# Patient Record
Sex: FEMALE | Race: WHITE | NOT HISPANIC OR LATINO | Employment: FULL TIME | URBAN - METROPOLITAN AREA
[De-identification: names, ages, dates, MRNs, and addresses within clinical notes are randomized per-mention and may not be internally consistent; named-entity substitution may affect disease eponyms.]

---

## 2019-11-25 ENCOUNTER — OFFICE VISIT (OUTPATIENT)
Dept: FAMILY MEDICINE CLINIC | Facility: MEDICAL CENTER | Age: 44
End: 2019-11-25
Payer: COMMERCIAL

## 2019-11-25 VITALS
SYSTOLIC BLOOD PRESSURE: 128 MMHG | HEIGHT: 66 IN | WEIGHT: 216 LBS | HEART RATE: 68 BPM | DIASTOLIC BLOOD PRESSURE: 86 MMHG | BODY MASS INDEX: 34.72 KG/M2 | RESPIRATION RATE: 16 BRPM

## 2019-11-25 DIAGNOSIS — Z13.1 SCREENING FOR DIABETES MELLITUS: ICD-10-CM

## 2019-11-25 DIAGNOSIS — Z80.0 FAMILY HISTORY OF COLON CANCER IN FATHER: ICD-10-CM

## 2019-11-25 DIAGNOSIS — Z00.00 PHYSICAL EXAM, ANNUAL: Primary | ICD-10-CM

## 2019-11-25 DIAGNOSIS — Z13.29 SCREENING FOR THYROID DISORDER: ICD-10-CM

## 2019-11-25 DIAGNOSIS — Z11.4 SCREENING FOR HIV (HUMAN IMMUNODEFICIENCY VIRUS): ICD-10-CM

## 2019-11-25 DIAGNOSIS — Z11.59 NEED FOR HEPATITIS C SCREENING TEST: ICD-10-CM

## 2019-11-25 DIAGNOSIS — Z12.11 SCREENING FOR COLON CANCER: ICD-10-CM

## 2019-11-25 DIAGNOSIS — J45.909 ASTHMA, UNSPECIFIED ASTHMA SEVERITY, UNSPECIFIED WHETHER COMPLICATED, UNSPECIFIED WHETHER PERSISTENT: ICD-10-CM

## 2019-11-25 DIAGNOSIS — Z23 ENCOUNTER FOR IMMUNIZATION: ICD-10-CM

## 2019-11-25 DIAGNOSIS — Z13.220 SCREENING FOR LIPID DISORDERS: ICD-10-CM

## 2019-11-25 PROCEDURE — 90471 IMMUNIZATION ADMIN: CPT | Performed by: FAMILY MEDICINE

## 2019-11-25 PROCEDURE — 90670 PCV13 VACCINE IM: CPT | Performed by: FAMILY MEDICINE

## 2019-11-25 PROCEDURE — 99386 PREV VISIT NEW AGE 40-64: CPT | Performed by: FAMILY MEDICINE

## 2019-11-25 RX ORDER — LEVONORGESTREL / ETHINYL ESTRADIOL AND ETHINYL ESTRADIOL 150-30(84)
1 KIT ORAL EVERY MORNING
Refills: 2 | COMMUNITY
Start: 2019-10-11

## 2019-11-25 RX ORDER — MONTELUKAST SODIUM 10 MG/1
10 TABLET ORAL DAILY
Refills: 2 | COMMUNITY
Start: 2019-08-27 | End: 2021-11-18

## 2019-11-25 RX ORDER — CETIRIZINE HYDROCHLORIDE 10 MG/1
10 TABLET ORAL DAILY
COMMUNITY

## 2019-11-25 RX ORDER — ALBUTEROL SULFATE 90 UG/1
2 AEROSOL, METERED RESPIRATORY (INHALATION) EVERY 6 HOURS PRN
COMMUNITY

## 2019-11-25 NOTE — PROGRESS NOTES
Assessment/Plan:    No problem-specific Assessment & Plan notes found for this encounter  Diagnoses and all orders for this visit:    Physical exam, annual  42-year-old female presenting for a physical exam   BMI Counseling: Body mass index is 35 13 kg/m²  The BMI is above normal  Nutrition recommendations include decreasing overall calorie intake  Exercise recommendations include moderate aerobic physical activity for 150 minutes/week  Asthma, unspecified asthma severity, unspecified whether complicated, unspecified whether persistent  -     Ambulatory referral to Pulmonology; Future  Referral to pulmonology for continued management  Encounter for immunization  -     PNEUMOCOCCAL CONJUGATE VACCINE 13-VALENT GREATER THAN 6 MONTHS  Pneumococcal vaccine given and tolerated well  Flu vaccine recommended but patient declined  Tdap was recommended and patient states [facility]     Screening for lipid disorders  -     Lipid Panel with Direct LDL reflex; Future  Screening for diabetes mellitus  -     Comprehensive metabolic panel; Future  -     Hemoglobin A1C; Future  Screening for thyroid disorder  -     T4, free; Future  -     TSH, 3rd generation; Future  Check labs to screen for lipid disorders, diabetes and thyroid disorder  Need for hepatitis C screening test  -     Hepatitis C antibody; Future  Screening for HIV (human immunodeficiency virus)  -     HIV 1/2 AG-AB combo; Future  Patient did give verbal consent for hep C and HIV screening  Screening for colon cancer  -     Ambulatory referral to Gastroenterology; Future  Family history of colon cancer in father  -     Ambulatory referral to Gastroenterology; Future  Due to family history of colon cancer I will have patient see Gastroenterology for colon cancer screening  Other orders  -     montelukast (SINGULAIR) 10 mg tablet;  Take 10 mg by mouth daily  -     WIXELA INHUB 250-50 MCG/DOSE inhaler; 1 INHALATION EVERY 12 HOURS   -     Levonorgest-Eth Estrad 91-Day 0 15-0 03 &0 01 MG TABS; Take 1 tablet by mouth daily  -     cetirizine (ZyrTEC) 10 mg tablet; Take 10 mg by mouth daily  -     albuterol (PROVENTIL HFA,VENTOLIN HFA) 90 mcg/act inhaler; Inhale 2 puffs every 6 (six) hours as needed for wheezing    Continue regular follow-up with Gynecology  Keep upcoming appointment for mammogram     Follow-up in one year sooner if needed  Subjective:      Patient ID: Daniela Haney is a 53 y o  female  Patient presents to [facility] care  She is here today for a physical exam   She has had problems with high blood pressure in the past but was never diagnosed with hypertension  She has a diagnosis of asthma  Her pulmonologist did retire  Currently she uses albuterol, Zyrtec, Singulair and Wixela  She is in the process of looking for a new pulmonologist   She does have enough medication  She does not smoke  She drinks alcohol socially  No drug use  She states she eats healthy but does not exercise regularly  She otherwise has no acute concerns  She is agreeable to the pneumonia vaccine but does not want a flu vaccine  She would like some lab work  She does have a gynecologist through [facility] and will be getting her mammogram at [facility]  The following portions of the patient's history were reviewed and updated as appropriate:   She  has a past medical history of Asthma  She   Patient Active Problem List    Diagnosis Date Noted    Asthma 2019    Family history of colon cancer in father 2019     She  has a past surgical history that includes  section; Tonsillectomy; and Rotator cuff repair (Right)  Her family history includes Colon cancer in her father; Coronary artery disease in her mother; Heart disease in her mother; Hepatitis in her father; Liver cancer in her father  She  reports that she has never smoked  She has never used smokeless tobacco  She reports that she drinks alcohol  She reports that she does not use drugs  Current Outpatient Medications   Medication Sig Dispense Refill    albuterol (PROVENTIL HFA,VENTOLIN HFA) 90 mcg/act inhaler Inhale 2 puffs every 6 (six) hours as needed for wheezing      cetirizine (ZyrTEC) 10 mg tablet Take 10 mg by mouth daily      Levonorgest-Eth Estrad 91-Day 0 15-0 03 &0 01 MG TABS Take 1 tablet by mouth daily  2    montelukast (SINGULAIR) 10 mg tablet Take 10 mg by mouth daily  2    WIXELA INHUB 250-50 MCG/DOSE inhaler 1 INHALATION EVERY 12 HOURS   2     No current facility-administered medications for this visit  Current Outpatient Medications on File Prior to Visit   Medication Sig    albuterol (PROVENTIL HFA,VENTOLIN HFA) 90 mcg/act inhaler Inhale 2 puffs every 6 (six) hours as needed for wheezing    cetirizine (ZyrTEC) 10 mg tablet Take 10 mg by mouth daily    Levonorgest-Eth Estrad 91-Day 0 15-0 03 &0 01 MG TABS Take 1 tablet by mouth daily    montelukast (SINGULAIR) 10 mg tablet Take 10 mg by mouth daily    WIXELA INHUB 250-50 MCG/DOSE inhaler 1 INHALATION EVERY 12 HOURS  No current facility-administered medications on file prior to visit  She has No Known Allergies       Review of Systems   Constitutional: Negative for fever  Respiratory: Negative for shortness of breath  Cardiovascular: Negative for chest pain  Gastrointestinal: Negative for abdominal pain and blood in stool  Genitourinary: Negative for dysuria and hematuria  Musculoskeletal: Negative for arthralgias and myalgias  Skin: Negative for rash  Neurological: Negative for headaches  Objective:      /86 (BP Location: Left arm, Patient Position: Sitting, Cuff Size: Large)   Pulse 68   Resp 16   Ht 5' 5 75" (1 67 m)   Wt 98 kg (216 lb)   BMI 35 13 kg/m²          Physical Exam   Constitutional: She is oriented to person, place, and time  Vital signs are normal  She appears well-developed and well-nourished     HENT:   Head: Normocephalic and atraumatic  Right Ear: Tympanic membrane, external ear and ear canal normal    Left Ear: Tympanic membrane, external ear and ear canal normal    Nose: Nose normal    Mouth/Throat: Uvula is midline, oropharynx is clear and moist and mucous membranes are normal    Eyes: Pupils are equal, round, and reactive to light  Conjunctivae, EOM and lids are normal    Neck: Trachea normal  Neck supple  No thyromegaly present  Cardiovascular: Normal rate, regular rhythm, S1 normal and S2 normal    No murmur heard  Pulmonary/Chest: Effort normal and breath sounds normal    Abdominal: Soft  Bowel sounds are normal  There is no tenderness  Lymphadenopathy:     She has no cervical adenopathy  Neurological: She is alert and oriented to person, place, and time  No cranial nerve deficit  Skin: Skin is warm, dry and intact  Psychiatric: She has a normal mood and affect   Her speech is normal and behavior is normal  Thought content normal

## 2019-12-19 ENCOUNTER — DOCUMENTATION (OUTPATIENT)
Dept: PULMONOLOGY | Facility: CLINIC | Age: 44
End: 2019-12-19

## 2019-12-19 ENCOUNTER — OFFICE VISIT (OUTPATIENT)
Dept: PULMONOLOGY | Facility: CLINIC | Age: 44
End: 2019-12-19
Payer: COMMERCIAL

## 2019-12-19 VITALS
WEIGHT: 219.2 LBS | HEART RATE: 86 BPM | DIASTOLIC BLOOD PRESSURE: 80 MMHG | TEMPERATURE: 98.6 F | HEIGHT: 66 IN | BODY MASS INDEX: 35.23 KG/M2 | OXYGEN SATURATION: 97 % | SYSTOLIC BLOOD PRESSURE: 136 MMHG

## 2019-12-19 DIAGNOSIS — J45.909 ASTHMA, UNSPECIFIED ASTHMA SEVERITY, UNSPECIFIED WHETHER COMPLICATED, UNSPECIFIED WHETHER PERSISTENT: ICD-10-CM

## 2019-12-19 PROCEDURE — 99244 OFF/OP CNSLTJ NEW/EST MOD 40: CPT | Performed by: INTERNAL MEDICINE

## 2019-12-19 RX ORDER — ALBUTEROL SULFATE 2.5 MG/3ML
2.5 SOLUTION RESPIRATORY (INHALATION) EVERY 6 HOURS PRN
Qty: 90 VIAL | Refills: 11 | Status: SHIPPED | OUTPATIENT
Start: 2019-12-19

## 2019-12-19 RX ORDER — FLUTICASONE FUROATE AND VILANTEROL 100; 25 UG/1; UG/1
1 POWDER RESPIRATORY (INHALATION) DAILY
Qty: 1 INHALER | Refills: 11 | Status: SHIPPED | OUTPATIENT
Start: 2019-12-19 | End: 2020-12-23 | Stop reason: SDUPTHER

## 2019-12-19 NOTE — PROGRESS NOTES
Subjective:     Patient ID: Hernan Beasley is a 53 y o  female  HPI   Patient who was diagnosed with Asthma 20 years ago presents to establish care  She reports that her Asthma has been relatively well controlled  Patient used to see Dr Shyanne Badillo prior to him retiring 2 years ago  Patient reports that she has been using urgent cares to manage her Asthma for the last 2 years  The last time she experienced an Asthma exacerbation was this past October  She presented to an urgent care at that time and was subsequently diagnosed with a "lung infection" which was treated with a course of oral steroids and antibiotics  For her asthma, patient is currenly using a generic form of Advair (Wixela) once daily, Singular one tablet daily and Ventolin PRN  Patient reports that she only needs to use her rescue inhaler once a month  However, she reports that she always uses her rescue inhaler prior to exercising  Allergies- Allergic to Cats, dust and pollen    Social History- Has never smoked cigarettes  Patient is an  at [employer]  Family History- Mother with Asthma  Father  of liver cancer with metastasis to colon, lung and brain in his early 46s  The following portions of the patient's history were reviewed in this encounter and updated as appropriate:   Review of Systems   Constitutional: Negative for activity change, appetite change, chills, fatigue and unexpected weight change  HENT: Negative for congestion, rhinorrhea, sinus pressure and sinus pain  Eyes: Negative for visual disturbance  Respiratory: Negative for cough, chest tightness, shortness of breath and wheezing  Cardiovascular: Negative for chest pain, palpitations and leg swelling  Gastrointestinal: Negative for abdominal distention, abdominal pain, blood in stool, constipation, diarrhea, nausea and vomiting  Genitourinary: Negative for difficulty urinating and dysuria     Musculoskeletal: Negative for arthralgias and myalgias  Skin: Negative for color change  Allergic/Immunologic: Positive for environmental allergies  Neurological: Negative for dizziness, tremors, syncope, light-headedness and headaches  Psychiatric/Behavioral: Negative for sleep disturbance  Objective:  Physical Exam   Constitutional: She is oriented to person, place, and time  She appears well-developed and well-nourished  No distress  HENT:   Head: Normocephalic and atraumatic  Right Ear: External ear normal    Left Ear: External ear normal    Nose: Nose normal    Mouth/Throat: Oropharynx is clear and moist  No oropharyngeal exudate  Eyes: Conjunctivae and EOM are normal  Right eye exhibits no discharge  Left eye exhibits no discharge  No scleral icterus  Neck: Normal range of motion  Neck supple  Cardiovascular: Normal rate, regular rhythm, normal heart sounds and intact distal pulses  Pulmonary/Chest: Effort normal and breath sounds normal  No respiratory distress  She has no wheezes  Musculoskeletal: Normal range of motion  Neurological: She is alert and oriented to person, place, and time  Skin: Skin is warm and dry  Capillary refill takes less than 2 seconds  She is not diaphoretic  Psychiatric: She has a normal mood and affect  Her behavior is normal    Vitals reviewed  Assessment: Patient is a well appearing 53year old woman with PMHx of Mild Asthma who presents to establish care  Plan:  Patient to continue on Singulair and Ventolin  Will stop Wixela and begin Breo once daily  Will follow up with patient in 6 months and at that point we can order PFTs to get patient's baseline lung function  Return in about 6 months (around 6/19/2020)  The patient indicates understanding of these issues and agrees with the plan          Fatemeh Cuellar MD

## 2019-12-23 LAB
ALBUMIN SERPL-MCNC: 3.8 G/DL (ref 3.6–5.1)
ALBUMIN/GLOB SERPL: 1.3 (CALC) (ref 1–2.5)
ALP SERPL-CCNC: 94 U/L (ref 33–115)
ALT SERPL-CCNC: 12 U/L (ref 6–29)
AST SERPL-CCNC: 12 U/L (ref 10–35)
BILIRUB SERPL-MCNC: 0.5 MG/DL (ref 0.2–1.2)
BUN SERPL-MCNC: 11 MG/DL (ref 7–25)
BUN/CREAT SERPL: NORMAL (CALC) (ref 6–22)
CALCIUM SERPL-MCNC: 8.8 MG/DL (ref 8.6–10.2)
CHLORIDE SERPL-SCNC: 104 MMOL/L (ref 98–110)
CHOLEST SERPL-MCNC: 216 MG/DL
CHOLEST/HDLC SERPL: 5 (CALC)
CO2 SERPL-SCNC: 27 MMOL/L (ref 20–32)
CREAT SERPL-MCNC: 0.93 MG/DL (ref 0.5–1.1)
GLOBULIN SER CALC-MCNC: 3 G/DL (CALC) (ref 1.9–3.7)
GLUCOSE SERPL-MCNC: 75 MG/DL (ref 65–99)
HBA1C MFR BLD: 5.3 % OF TOTAL HGB
HCV AB S/CO SERPL IA: 0.05
HCV AB SERPL QL IA: NORMAL
HDLC SERPL-MCNC: 43 MG/DL
HIV 1+2 AB+HIV1 P24 AG SERPL QL IA: NORMAL
LDLC SERPL CALC-MCNC: 144 MG/DL (CALC)
NONHDLC SERPL-MCNC: 173 MG/DL (CALC)
POTASSIUM SERPL-SCNC: 4 MMOL/L (ref 3.5–5.3)
PROT SERPL-MCNC: 6.8 G/DL (ref 6.1–8.1)
SL AMB EGFR AFRICAN AMERICAN: 85 ML/MIN/1.73M2
SL AMB EGFR NON AFRICAN AMERICAN: 74 ML/MIN/1.73M2
SODIUM SERPL-SCNC: 138 MMOL/L (ref 135–146)
T4 FREE SERPL-MCNC: 1 NG/DL (ref 0.8–1.8)
TRIGL SERPL-MCNC: 156 MG/DL
TSH SERPL-ACNC: 0.83 MIU/L

## 2019-12-26 ENCOUNTER — TELEPHONE (OUTPATIENT)
Dept: FAMILY MEDICINE CLINIC | Facility: MEDICAL CENTER | Age: 44
End: 2019-12-26

## 2019-12-26 NOTE — TELEPHONE ENCOUNTER
----- Message from Andrey Radford DO sent at 12/26/2019  8:24 AM EST -----  Blood work reviewed  Cholesterol is elevated but not alarmingly so  I do recommend diet and exercise to help lower cholesterol overall  Remainder of labs unremarkable  Hep C and HIV both negative

## 2020-04-29 ENCOUNTER — TELEPHONE (OUTPATIENT)
Dept: GASTROENTEROLOGY | Facility: AMBULARY SURGERY CENTER | Age: 45
End: 2020-04-29

## 2020-08-17 ENCOUNTER — TELEPHONE (OUTPATIENT)
Dept: GASTROENTEROLOGY | Facility: CLINIC | Age: 45
End: 2020-08-17

## 2020-08-17 NOTE — TELEPHONE ENCOUNTER
Attempted to call pt for OA screening and discuss scheduling colonoscopy, no VM  Letter sent to call in

## 2020-11-30 ENCOUNTER — TELEPHONE (OUTPATIENT)
Dept: ADMINISTRATIVE | Facility: OTHER | Age: 45
End: 2020-11-30

## 2020-11-30 ENCOUNTER — OFFICE VISIT (OUTPATIENT)
Dept: FAMILY MEDICINE CLINIC | Facility: MEDICAL CENTER | Age: 45
End: 2020-11-30
Payer: COMMERCIAL

## 2020-11-30 VITALS
HEIGHT: 66 IN | DIASTOLIC BLOOD PRESSURE: 90 MMHG | WEIGHT: 232.8 LBS | SYSTOLIC BLOOD PRESSURE: 138 MMHG | TEMPERATURE: 98.1 F | OXYGEN SATURATION: 97 % | BODY MASS INDEX: 37.41 KG/M2 | HEART RATE: 84 BPM

## 2020-11-30 DIAGNOSIS — Z80.0 FAMILY HISTORY OF COLON CANCER IN FATHER: ICD-10-CM

## 2020-11-30 DIAGNOSIS — Z00.00 PHYSICAL EXAM, ANNUAL: Primary | ICD-10-CM

## 2020-11-30 DIAGNOSIS — Z23 IMMUNIZATION DUE: ICD-10-CM

## 2020-11-30 DIAGNOSIS — Z13.29 SCREENING FOR THYROID DISORDER: ICD-10-CM

## 2020-11-30 DIAGNOSIS — Z13.1 SCREENING FOR DIABETES MELLITUS: ICD-10-CM

## 2020-11-30 DIAGNOSIS — M17.12 PATELLOFEMORAL ARTHRITIS OF LEFT KNEE: ICD-10-CM

## 2020-11-30 DIAGNOSIS — Z13.220 SCREENING FOR LIPID DISORDERS: ICD-10-CM

## 2020-11-30 PROCEDURE — 90732 PPSV23 VACC 2 YRS+ SUBQ/IM: CPT | Performed by: FAMILY MEDICINE

## 2020-11-30 PROCEDURE — 99396 PREV VISIT EST AGE 40-64: CPT | Performed by: FAMILY MEDICINE

## 2020-11-30 PROCEDURE — 90471 IMMUNIZATION ADMIN: CPT | Performed by: FAMILY MEDICINE

## 2020-11-30 RX ORDER — DICLOFENAC SODIUM 75 MG/1
75 TABLET, DELAYED RELEASE ORAL AS NEEDED
COMMUNITY
Start: 2020-07-15

## 2020-11-30 RX ORDER — LANSOPRAZOLE 30 MG/1
CAPSULE, DELAYED RELEASE ORAL AS NEEDED
COMMUNITY

## 2020-11-30 RX ORDER — FAMOTIDINE 10 MG
10 TABLET ORAL DAILY
COMMUNITY
End: 2021-09-23

## 2020-12-22 ENCOUNTER — OFFICE VISIT (OUTPATIENT)
Dept: OBGYN CLINIC | Facility: HOSPITAL | Age: 45
End: 2020-12-22
Attending: FAMILY MEDICINE
Payer: COMMERCIAL

## 2020-12-22 ENCOUNTER — HOSPITAL ENCOUNTER (OUTPATIENT)
Dept: RADIOLOGY | Facility: HOSPITAL | Age: 45
Discharge: HOME | End: 2020-12-22
Attending: ORTHOPAEDIC SURGERY
Payer: COMMERCIAL

## 2020-12-22 VITALS
HEART RATE: 71 BPM | DIASTOLIC BLOOD PRESSURE: 88 MMHG | WEIGHT: 225 LBS | BODY MASS INDEX: 36.16 KG/M2 | SYSTOLIC BLOOD PRESSURE: 145 MMHG | HEIGHT: 66 IN

## 2020-12-22 DIAGNOSIS — M25.562 LEFT KNEE PAIN, UNSPECIFIED CHRONICITY: ICD-10-CM

## 2020-12-22 DIAGNOSIS — M17.12 PATELLOFEMORAL ARTHRITIS OF LEFT KNEE: Primary | ICD-10-CM

## 2020-12-22 PROCEDURE — 99203 OFFICE O/P NEW LOW 30 MIN: CPT | Performed by: ORTHOPAEDIC SURGERY

## 2020-12-22 PROCEDURE — 1036F TOBACCO NON-USER: CPT | Performed by: ORTHOPAEDIC SURGERY

## 2020-12-22 PROCEDURE — 73562 X-RAY EXAM OF KNEE 3: CPT

## 2020-12-22 PROCEDURE — 3008F BODY MASS INDEX DOCD: CPT | Performed by: ORTHOPAEDIC SURGERY

## 2020-12-23 DIAGNOSIS — J45.909 ASTHMA, UNSPECIFIED ASTHMA SEVERITY, UNSPECIFIED WHETHER COMPLICATED, UNSPECIFIED WHETHER PERSISTENT: ICD-10-CM

## 2020-12-23 RX ORDER — FLUTICASONE FUROATE AND VILANTEROL 100; 25 UG/1; UG/1
1 POWDER RESPIRATORY (INHALATION) DAILY
Qty: 1 INHALER | Refills: 0 | Status: SHIPPED | OUTPATIENT
Start: 2020-12-23 | End: 2021-01-15

## 2020-12-29 ENCOUNTER — EVALUATION (OUTPATIENT)
Dept: PHYSICAL THERAPY | Facility: CLINIC | Age: 45
End: 2020-12-29
Payer: COMMERCIAL

## 2020-12-29 ENCOUNTER — TRANSCRIBE ORDERS (OUTPATIENT)
Dept: PHYSICAL THERAPY | Facility: CLINIC | Age: 45
End: 2020-12-29

## 2020-12-29 DIAGNOSIS — M17.12 PATELLOFEMORAL ARTHRITIS OF LEFT KNEE: Primary | ICD-10-CM

## 2020-12-29 DIAGNOSIS — M17.12 PATELLOFEMORAL ARTHRITIS OF LEFT KNEE: ICD-10-CM

## 2020-12-29 PROCEDURE — 97162 PT EVAL MOD COMPLEX 30 MIN: CPT

## 2021-01-05 ENCOUNTER — OFFICE VISIT (OUTPATIENT)
Dept: PHYSICAL THERAPY | Facility: CLINIC | Age: 46
End: 2021-01-05
Payer: COMMERCIAL

## 2021-01-05 DIAGNOSIS — M17.12 PATELLOFEMORAL ARTHRITIS OF LEFT KNEE: Primary | ICD-10-CM

## 2021-01-05 PROCEDURE — 97110 THERAPEUTIC EXERCISES: CPT | Performed by: PHYSICAL THERAPIST

## 2021-01-05 PROCEDURE — 97112 NEUROMUSCULAR REEDUCATION: CPT | Performed by: PHYSICAL THERAPIST

## 2021-01-05 NOTE — PROGRESS NOTES
Daily Note     Today's date: 2021  Patient name: Chuyita Kendall  : 1975  MRN: [id number]  Referring provider: Jane Schneider MD  Dx:   Encounter Diagnosis     ICD-10-CM    1  Patellofemoral arthritis of left knee  M17 12                   Subjective: My left knee is painful and weak  Objective: See treatment diary below      Assessment: Tolerated treatment well  Patient demonstrated fatigue post treatment and exhibited good technique with therapeutic exercises      Plan: Continue per plan of care        Precautions:   Past Medical History:   Diagnosis Date    Asthma            Manuals                                                                 Neuro Re-Ed             Neurac supine pelvic lift 2x5            neurac Bridge 2x5            Neurac SDLY Hip ABD 2x5            Neurac SDLY Hip ADD 2x5            Neurac Prone plank 2x5                                      Ther Ex             Resisted lunges Red TB 20x            Squats  20x            Balance Lunge bosu 20x                                                                             Ther Activity                                       Gait Training                                       Modalities

## 2021-01-07 ENCOUNTER — APPOINTMENT (OUTPATIENT)
Dept: PHYSICAL THERAPY | Facility: CLINIC | Age: 46
End: 2021-01-07
Payer: COMMERCIAL

## 2021-01-12 ENCOUNTER — OFFICE VISIT (OUTPATIENT)
Dept: PULMONOLOGY | Facility: CLINIC | Age: 46
End: 2021-01-12
Payer: COMMERCIAL

## 2021-01-12 ENCOUNTER — OFFICE VISIT (OUTPATIENT)
Dept: PHYSICAL THERAPY | Facility: CLINIC | Age: 46
End: 2021-01-12
Payer: COMMERCIAL

## 2021-01-12 VITALS
WEIGHT: 234.4 LBS | BODY MASS INDEX: 39.05 KG/M2 | SYSTOLIC BLOOD PRESSURE: 142 MMHG | OXYGEN SATURATION: 98 % | RESPIRATION RATE: 18 BRPM | DIASTOLIC BLOOD PRESSURE: 88 MMHG | HEIGHT: 65 IN | HEART RATE: 83 BPM | TEMPERATURE: 97.4 F

## 2021-01-12 DIAGNOSIS — J45.909 ASTHMA, UNSPECIFIED ASTHMA SEVERITY, UNSPECIFIED WHETHER COMPLICATED, UNSPECIFIED WHETHER PERSISTENT: Primary | ICD-10-CM

## 2021-01-12 DIAGNOSIS — M17.12 PATELLOFEMORAL ARTHRITIS OF LEFT KNEE: Primary | ICD-10-CM

## 2021-01-12 LAB
ALBUMIN SERPL-MCNC: 4.1 G/DL (ref 3.8–4.8)
ALBUMIN/GLOB SERPL: 1.4 {RATIO} (ref 1.2–2.2)
ALP SERPL-CCNC: 117 IU/L (ref 39–117)
ALT SERPL-CCNC: 14 IU/L (ref 0–32)
AST SERPL-CCNC: 13 IU/L (ref 0–40)
BILIRUB SERPL-MCNC: <0.2 MG/DL (ref 0–1.2)
BUN SERPL-MCNC: 16 MG/DL (ref 6–24)
BUN/CREAT SERPL: 15 (ref 9–23)
CALCIUM SERPL-MCNC: 9.1 MG/DL (ref 8.7–10.2)
CHLORIDE SERPL-SCNC: 102 MMOL/L (ref 96–106)
CHOLEST SERPL-MCNC: 218 MG/DL (ref 100–199)
CO2 SERPL-SCNC: 21 MMOL/L (ref 20–29)
CREAT SERPL-MCNC: 1.07 MG/DL (ref 0.57–1)
GLOBULIN SER-MCNC: 2.9 G/DL (ref 1.5–4.5)
GLUCOSE SERPL-MCNC: 87 MG/DL (ref 65–99)
HBA1C MFR BLD: 5.4 % (ref 4.8–5.6)
HDLC SERPL-MCNC: 43 MG/DL
LDLC SERPL CALC-MCNC: 134 MG/DL (ref 0–99)
LDLC SERPL DIRECT ASSAY-MCNC: 142 MG/DL (ref 0–99)
POTASSIUM SERPL-SCNC: 4.3 MMOL/L (ref 3.5–5.2)
PROT SERPL-MCNC: 7 G/DL (ref 6–8.5)
SL AMB EGFR AFRICAN AMERICAN: 71 ML/MIN/1.73
SL AMB EGFR NON AFRICAN AMERICAN: 62 ML/MIN/1.73
SL AMB VLDL CHOLESTEROL CALC: 41 MG/DL (ref 5–40)
SODIUM SERPL-SCNC: 138 MMOL/L (ref 134–144)
T4 FREE SERPL-MCNC: 0.94 NG/DL (ref 0.82–1.77)
TRIGL SERPL-MCNC: 228 MG/DL (ref 0–149)
TSH SERPL DL<=0.005 MIU/L-ACNC: 2.66 UIU/ML (ref 0.45–4.5)

## 2021-01-12 PROCEDURE — 99213 OFFICE O/P EST LOW 20 MIN: CPT | Performed by: INTERNAL MEDICINE

## 2021-01-12 PROCEDURE — 1036F TOBACCO NON-USER: CPT | Performed by: INTERNAL MEDICINE

## 2021-01-12 PROCEDURE — 97110 THERAPEUTIC EXERCISES: CPT

## 2021-01-12 PROCEDURE — 3008F BODY MASS INDEX DOCD: CPT | Performed by: INTERNAL MEDICINE

## 2021-01-12 PROCEDURE — 97140 MANUAL THERAPY 1/> REGIONS: CPT

## 2021-01-12 NOTE — PROGRESS NOTES
Pulmonary Follow Up Note   Kraig Oliva 50 y o  female MRN: [id number]  2021      Assessment:    Asthma  She is asymptomatic on Breo and does not use her rescue inhaler essentially ever  Since this year she has not had the exposures she has most years, we will elect to continue this therapy for another year  We can consider de-escalating next year while knowing that at the moment we will be limited to options that are BID at that time  Plan:    Diagnoses and all orders for this visit:    Asthma, unspecified asthma severity, unspecified whether complicated, unspecified whether persistent      Return in about 1 year (around 2022)  History of Present Illness   HPI:  Betty Solares is a 50 y o  female who returns for follow up of her asthma  She has had no exacerbations since her last appointment and only has very infrequent rescue inhaler use  She is compliant with breo daily and has been able to walk up to 4 miles a day without difficulty while taking the medication  She denies, cough, shortness of breath and wheezing  No other new complaints  Review of Systems   Respiratory: Negative for cough, shortness of breath and wheezing  All other systems reviewed and are negative        Historical Information   Past Medical History:   Diagnosis Date    Asthma      Past Surgical History:   Procedure Laterality Date     SECTION      ROTATOR CUFF REPAIR Right     TONSILLECTOMY       Family History   Problem Relation Age of Onset    Heart disease Mother     Coronary artery disease Mother     Colon cancer Father     Liver cancer Father     Hepatitis Father          Meds/Allergies     Current Outpatient Medications:     albuterol (2 5 mg/3 mL) 0 083 % nebulizer solution, Take 1 vial (2 5 mg total) by nebulization every 6 (six) hours as needed for wheezing or shortness of breath, Disp: 90 vial, Rfl: 11    albuterol (PROVENTIL HFA,VENTOLIN HFA) 90 mcg/act inhaler, Inhale 2 puffs every 6 (six) hours as needed for wheezing, Disp: , Rfl:     cetirizine (ZyrTEC) 10 mg tablet, Take 10 mg by mouth daily, Disp: , Rfl:     diclofenac (VOLTAREN) 75 mg EC tablet, Take 75 mg by mouth daily, Disp: , Rfl:     famotidine (PEPCID) 10 mg tablet, Take 10 mg by mouth daily, Disp: , Rfl:     fluticasone-vilanterol (BREO ELLIPTA) 100-25 mcg/inh inhaler, Inhale 1 puff daily Rinse mouth after use , Disp: 1 Inhaler, Rfl: 0    lansoprazole (PREVACID) 30 mg capsule, Prevacid CPDR TAKE 1 CAPSULE DAILY  Refills: 0     Active, Disp: , Rfl:     Levonorgest-Eth Estrad 91-Day 0 15-0 03 &0 01 MG TABS, Take 1 tablet by mouth daily, Disp: , Rfl: 2    montelukast (SINGULAIR) 10 mg tablet, Take 10 mg by mouth daily, Disp: , Rfl: 2  Allergies   Allergen Reactions    Meloxicam Other (See Comments)     Swelling in legs        Vitals: Blood pressure 142/88, pulse 83, temperature (!) 97 4 °F (36 3 °C), temperature source Tympanic, resp  rate 18, height 5' 5" (1 651 m), weight 106 kg (234 lb 6 4 oz), SpO2 98 %  Body mass index is 39 01 kg/m²  Oxygen Therapy  SpO2: 98 %      Physical Exam  Physical Exam  Vitals signs reviewed  Constitutional:       General: She is not in acute distress  Appearance: She is well-developed  HENT:      Head: Normocephalic and atraumatic  Mouth/Throat:      Pharynx: No oropharyngeal exudate  Neck:      Vascular: No JVD  Cardiovascular:      Rate and Rhythm: Normal rate and regular rhythm  Heart sounds: Normal heart sounds  No murmur  No friction rub  No gallop  Pulmonary:      Effort: Pulmonary effort is normal  No respiratory distress  Breath sounds: Normal breath sounds  No wheezing or rales  Lymphadenopathy:      Cervical: No cervical adenopathy  Skin:     General: Skin is warm and dry  Findings: No rash  Neurological:      Mental Status: She is alert and oriented to person, place, and time  Labs: I have personally reviewed pertinent lab results    No results found for: WBC, HGB, HCT, MCV, PLT  Lab Results   Component Value Date    CALCIUM 8 8 12/21/2019    K 4 3 01/11/2021    CO2 21 01/11/2021     01/11/2021    BUN 16 01/11/2021    CREATININE 1 07 (H) 01/11/2021     No results found for: IGE  Lab Results   Component Value Date    ALT 14 01/11/2021    AST 13 01/11/2021    ALKPHOS 94 12/21/2019     Imaging and other studies: I have personally reviewed pertinent films in PACS    [facility]

## 2021-01-12 NOTE — ASSESSMENT & PLAN NOTE
She is asymptomatic on Breo and does not use her rescue inhaler essentially ever  Since this year she has not had the exposures she has most years, we will elect to continue this therapy for another year  We can consider de-escalating next year while knowing that at the moment we will be limited to options that are BID at that time

## 2021-01-12 NOTE — PROGRESS NOTES
Daily Note     Today's date: 2021  Patient name: Cortez Jarquin  : 1975  MRN: [id number]  Referring provider: Awa Millan MD  Dx:   Encounter Diagnosis     ICD-10-CM    1  Patellofemoral arthritis of left knee  M17 12        Start Time: 1655  Stop Time: 1735  Total time in clinic (min): 40 minutes    Subjective: I am sore all over from exercise  Objective: See treatment diary below      Assessment: Tolerated treatment well  Patient exhibited good technique with therapeutic exercises and would benefit from continued PT      Plan: Progress treatment as tolerated         Precautions:   Past Medical History:   Diagnosis Date    Asthma            Manuals             L HS/quad/gastroc/ITB/ iliopsoas stretching ---- performed                                                  Neuro Re-Ed             Neurac supine pelvic lift 2x5 ---           neurac Bridge 2x5 ---           Neurac SDLY Hip ABD 2x5 ---           Neurac SDLY Hip ADD 2x5 ----           Neurac Prone plank 2x5 ---                                     Ther Ex             Resisted lunges Red TB 20x ---           Squats  20x 18"mat 20x           Balance Lunge bosu 20x ----           Step taps  2" 2x10 reps           Wall slides  2x10 reps            SLR  3#AK 2x10            Hip abd  3#AK 2x10 reps           Bridging on air cushions  W/add & abd 4x10 reps           Ther Activity                                       Gait Training                                       Modalities

## 2021-01-13 ENCOUNTER — TELEPHONE (OUTPATIENT)
Dept: FAMILY MEDICINE CLINIC | Facility: MEDICAL CENTER | Age: 46
End: 2021-01-13

## 2021-01-13 DIAGNOSIS — R79.89 ELEVATED SERUM CREATININE: Primary | ICD-10-CM

## 2021-01-13 NOTE — TELEPHONE ENCOUNTER
----- Message from Katt Diallo DO sent at 1/13/2021  2:00 PM EST -----  Creatinine slightly elevated  I recommend repeating labs to monitor creatinine in about one month  May have been secondary to dehydration  Lipids are elevated including cholesterol and triglycerides  They are not alarmingly high however diet and exercise is recommended to help lower lipid levels and help prevent future heart disease and pancreatitis  Remainder of labs unremarkable  Please let me know if patient is agreeable to repeating labs

## 2021-01-14 ENCOUNTER — OFFICE VISIT (OUTPATIENT)
Dept: PHYSICAL THERAPY | Facility: CLINIC | Age: 46
End: 2021-01-14
Payer: COMMERCIAL

## 2021-01-14 DIAGNOSIS — M17.12 PATELLOFEMORAL ARTHRITIS OF LEFT KNEE: Primary | ICD-10-CM

## 2021-01-14 PROCEDURE — 97140 MANUAL THERAPY 1/> REGIONS: CPT

## 2021-01-14 PROCEDURE — 97110 THERAPEUTIC EXERCISES: CPT

## 2021-01-14 NOTE — PROGRESS NOTES
Daily Note     Today's date: 2021  Patient name: Hernan Beasley  : 1975  MRN: [id number]  Referring provider: Manolo Gonzalez MD  Dx:   Encounter Diagnosis     ICD-10-CM    1  Patellofemoral arthritis of left knee  M17 12                   Subjective: Sore, but feeling better  Objective: See treatment diary below      Assessment: Tolerated treatment well  Patient exhibited good technique with therapeutic exercises and would benefit from continued PT      Plan: Progress treatment as tolerated         Precautions:   Past Medical History:   Diagnosis Date    Asthma            Manuals            L HS/quad/gastroc/ITB/ iliopsoas stretching ---- performed performed (B)                                                 Neuro Re-Ed             Neurac supine pelvic lift 2x5 ---           neurac Bridge 2x5 ---           Neurac SDLY Hip ABD 2x5 ---           Neurac SDLY Hip ADD 2x5 ----           Neurac Prone plank 2x5 ---                                     Ther Ex             Resisted lunges Red TB 20x --- ---          Squats  20x 18"mat 20x 18" mat 2x10 reps          Balance Lunge bosu 20x ---- ---          Step taps  2" 2x10 reps 2" 2 x 10 reps          Wall slides  2x10 reps  2x10 reps          SLR  3#AK 2x10  ---          Hip abd  3#AK 2x10 reps ---          Bridging on air cushions  W/add & abd 4x10 reps 4x10 reps          L SLS rebounder ---- ---- 22 reps           [place] knee stabs (B) ---- --- @#4 - 4 x10 reps                       Gait Training                                       Modalities

## 2021-01-15 DIAGNOSIS — J45.909 ASTHMA, UNSPECIFIED ASTHMA SEVERITY, UNSPECIFIED WHETHER COMPLICATED, UNSPECIFIED WHETHER PERSISTENT: ICD-10-CM

## 2021-01-15 RX ORDER — FLUTICASONE FUROATE AND VILANTEROL TRIFENATATE 100; 25 UG/1; UG/1
POWDER RESPIRATORY (INHALATION)
Qty: 1 INHALER | Refills: 5 | Status: SHIPPED | OUTPATIENT
Start: 2021-01-15 | End: 2021-07-26

## 2021-01-19 ENCOUNTER — APPOINTMENT (OUTPATIENT)
Dept: PHYSICAL THERAPY | Facility: CLINIC | Age: 46
End: 2021-01-19
Payer: COMMERCIAL

## 2021-01-20 ENCOUNTER — TELEPHONE (OUTPATIENT)
Dept: OBGYN CLINIC | Facility: OTHER | Age: 46
End: 2021-01-20

## 2021-01-20 NOTE — TELEPHONE ENCOUNTER
PT called in letting dr know they faxed over a plan of care at the [facility] office and would like the dr to sign it if possible         Please advise,

## 2021-01-21 ENCOUNTER — APPOINTMENT (OUTPATIENT)
Dept: PHYSICAL THERAPY | Facility: CLINIC | Age: 46
End: 2021-01-21
Payer: COMMERCIAL

## 2021-01-25 ENCOUNTER — OFFICE VISIT (OUTPATIENT)
Dept: PHYSICAL THERAPY | Facility: CLINIC | Age: 46
End: 2021-01-25
Payer: COMMERCIAL

## 2021-01-25 DIAGNOSIS — M17.12 PATELLOFEMORAL ARTHRITIS OF LEFT KNEE: Primary | ICD-10-CM

## 2021-01-25 PROCEDURE — 97110 THERAPEUTIC EXERCISES: CPT

## 2021-01-25 PROCEDURE — 97140 MANUAL THERAPY 1/> REGIONS: CPT

## 2021-01-25 NOTE — PROGRESS NOTES
Daily Note     Today's date: 2021  Patient name: Cade Ching  : 1975  MRN: [id number]  Referring provider: Trish Dang MD  Dx:   Encounter Diagnosis     ICD-10-CM    1  Patellofemoral arthritis of left knee  M17 12        Start Time: 1535          Subjective: Patient reports occasional L knee pain with certain movements  Objective: See treatment diary below      Assessment: Tolerated treatment well  Patient exhibited good technique with therapeutic exercises and would benefit from continued PT  Attempted pistol squats on elevated mat, but patient c/o increased L knee pain  L SLS improving  Plan: Progress treatment as tolerated         Precautions:   Past Medical History:   Diagnosis Date    Asthma            Manuals           L HS/quad/gastroc/ITB/ iliopsoas stretching ---- performed performed (B) performed                                                Neuro Re-Ed             Neurac supine pelvic lift 2x5 ---           neurac Bridge 2x5 ---           Neurac SDLY Hip ABD 2x5 ---           Neurac SDLY Hip ADD 2x5 ----           Neurac Prone plank 2x5 ---                                     Ther Ex             Resisted lunges Red TB 20x --- ---          Squats  20x 18"mat 20x 18" mat 2x10 reps 18"mat x 20 reps         Balance Lunge bosu 20x ---- --- ---         Step taps  2" 2x10 reps 2" 2 x 10 reps 2" 2 x 10 reps         Wall slides  2x10 reps  2x10 reps 2x10 reps         SLR  3#AK 2x10  --- --         Hip abd  3#AK 2x10 reps --- ---         Bridging on air cushions  W/add & abd 4x10 reps 4x10 reps 4x10 reps         L SLS rebounder ---- ---- 22 reps  30 reps         [place] knee stabs (B) ---- --- @#4 - 4 x10 reps @#5 - 4 x 20 reps (B)                      Gait Training                                       Modalities

## 2021-01-26 ENCOUNTER — APPOINTMENT (OUTPATIENT)
Dept: PHYSICAL THERAPY | Facility: CLINIC | Age: 46
End: 2021-01-26
Payer: COMMERCIAL

## 2021-01-28 ENCOUNTER — APPOINTMENT (OUTPATIENT)
Dept: PHYSICAL THERAPY | Facility: CLINIC | Age: 46
End: 2021-01-28
Payer: COMMERCIAL

## 2021-02-01 ENCOUNTER — APPOINTMENT (OUTPATIENT)
Dept: PHYSICAL THERAPY | Facility: CLINIC | Age: 46
End: 2021-02-01
Payer: COMMERCIAL

## 2021-02-04 ENCOUNTER — OFFICE VISIT (OUTPATIENT)
Dept: PHYSICAL THERAPY | Facility: CLINIC | Age: 46
End: 2021-02-04
Payer: COMMERCIAL

## 2021-02-04 DIAGNOSIS — M17.12 PATELLOFEMORAL ARTHRITIS OF LEFT KNEE: Primary | ICD-10-CM

## 2021-02-04 PROCEDURE — 97140 MANUAL THERAPY 1/> REGIONS: CPT

## 2021-02-04 PROCEDURE — 97110 THERAPEUTIC EXERCISES: CPT

## 2021-02-04 NOTE — PROGRESS NOTES
Daily Note     Today's date: 2021  Patient name: Allison Chris  : 1975  MRN: [id number]  Referring provider: Josy Swartz MD  Dx:   Encounter Diagnosis     ICD-10-CM    1  Patellofemoral arthritis of left knee  M17 12        Start Time: 1530          Subjective: Very sore today from shoveling snow  Objective: See treatment diary below      Assessment: Tolerated treatment well  Patient demonstrated fatigue post treatment and would benefit from continued PT      Plan: Progress treatment as tolerated         Precautions:   Past Medical History:   Diagnosis Date    Asthma            Manuals   2/4        L HS/quad/gastroc/ITB/ iliopsoas stretching ---- performed performed (B) performed perf                                               Neuro Re-Ed             Neurac supine pelvic lift 2x5 ---           neurac Bridge 2x5 ---           Neurac SDLY Hip ABD 2x5 ---           Neurac SDLY Hip ADD 2x5 ----           Neurac Prone plank 2x5 ---                                     Ther Ex             Resisted lunges Red TB 20x --- ---          Squats  20x 18"mat 20x 18" mat 2x10 reps 18"mat x 20 reps 20x low mat        Balance Lunge bosu 20x ---- --- --- ---        Step taps  2" 2x10 reps 2" 2 x 10 reps 2" 2 x 10 reps 2" x 20 reps        Wall slides  2x10 reps  2x10 reps 2x10 reps 2x10 reps        SLR  3#AK 2x10  --- -- ---        Hip abd  3#AK 2x10 reps --- --- ---        Bridging on air cushions  W/add & abd 4x10 reps 4x10 reps 4x10 reps 4x10 reps        L SLS rebounder ---- ---- 22 reps  30 reps -----        [place] knee stabs (B) ---- --- @#4 - 4 x10 reps @#5 - 4 x 20 reps (B) @#5 - 4 x 20 reps (B)                     Gait Training                                       Modalities

## 2021-02-08 ENCOUNTER — APPOINTMENT (OUTPATIENT)
Dept: PHYSICAL THERAPY | Facility: CLINIC | Age: 46
End: 2021-02-08
Payer: COMMERCIAL

## 2021-02-11 ENCOUNTER — APPOINTMENT (OUTPATIENT)
Dept: PHYSICAL THERAPY | Facility: CLINIC | Age: 46
End: 2021-02-11
Payer: COMMERCIAL

## 2021-02-13 LAB
BUN SERPL-MCNC: 12 MG/DL (ref 6–24)
BUN/CREAT SERPL: 12 (ref 9–23)
CALCIUM SERPL-MCNC: 8.9 MG/DL (ref 8.7–10.2)
CHLORIDE SERPL-SCNC: 102 MMOL/L (ref 96–106)
CO2 SERPL-SCNC: 20 MMOL/L (ref 20–29)
CREAT SERPL-MCNC: 1.04 MG/DL (ref 0.57–1)
GLUCOSE SERPL-MCNC: 84 MG/DL (ref 65–99)
POTASSIUM SERPL-SCNC: 4 MMOL/L (ref 3.5–5.2)
SL AMB EGFR AFRICAN AMERICAN: 74 ML/MIN/1.73
SL AMB EGFR NON AFRICAN AMERICAN: 64 ML/MIN/1.73
SODIUM SERPL-SCNC: 136 MMOL/L (ref 134–144)

## 2021-02-15 ENCOUNTER — OFFICE VISIT (OUTPATIENT)
Dept: PHYSICAL THERAPY | Facility: CLINIC | Age: 46
End: 2021-02-15
Payer: COMMERCIAL

## 2021-02-15 DIAGNOSIS — M17.12 PATELLOFEMORAL ARTHRITIS OF LEFT KNEE: Primary | ICD-10-CM

## 2021-02-15 PROCEDURE — 97110 THERAPEUTIC EXERCISES: CPT

## 2021-02-15 PROCEDURE — 97140 MANUAL THERAPY 1/> REGIONS: CPT

## 2021-02-15 NOTE — PROGRESS NOTES
Daily Note     Today's date: 2/15/2021  Patient name: Bharti Veliz  : 1975  MRN: [id number]  Referring provider: Denise Juarez MD  Dx:   Encounter Diagnosis     ICD-10-CM    1  Patellofemoral arthritis of left knee  M17 12        Start Time: 1535          Subjective: My leg feels stronger, but my knee still clicks sometimes  Objective: See treatment diary below      Assessment: Tolerated treatment fair  Patient demonstrated fatigue post treatment and would benefit from continued PT      Plan: Progress treatment as tolerated         Precautions:   Past Medical History:   Diagnosis Date    Asthma            Manuals  1/12 1/14 1/25 2/4 2/15       L HS/quad/gastroc/ITB/ iliopsoas stretching ---- performed performed (B) performed perf perf                                              Neuro Re-Ed             Neurac supine pelvic lift 2x5 ---           neurac Bridge 2x5 ---           Neurac SDLY Hip ABD 2x5 ---           Neurac SDLY Hip ADD 2x5 ----           Neurac Prone plank 2x5 ---                                     Ther Ex             Resisted lunges Red TB 20x --- ---   ----       Squats  20x 18"mat 20x 18" mat 2x10 reps 18"mat x 20 reps 20x low mat 20x low mat       Balance Lunge bosu 20x ---- --- --- --- ---       Step taps  2" 2x10 reps 2" 2 x 10 reps 2" 2 x 10 reps 2" x 20 reps 2" x 20 reps        Wall slides  2x10 reps  2x10 reps 2x10 reps 2x10 reps ---       SLR  3#AK 2x10  --- -- --- ---       Hip abd  3#AK 2x10 reps --- --- --- ---       Bridging on air cushions  W/add & abd 4x10 reps 4x10 reps 4x10 reps 4x10 reps 4x10 reps        L SLS rebounder ---- ---- 22 reps  30 reps ----- 30 reps        [place] knee stabs (B) ---- --- @#4 - 4 x10 reps @#5 - 4 x 20 reps (B) @#5 - 4 x 20 reps (B) @#5 - 4 x 10 reps                     Gait Training                                       Modalities

## 2021-02-18 ENCOUNTER — TELEPHONE (OUTPATIENT)
Dept: FAMILY MEDICINE CLINIC | Facility: MEDICAL CENTER | Age: 46
End: 2021-02-18

## 2021-02-18 ENCOUNTER — APPOINTMENT (OUTPATIENT)
Dept: PHYSICAL THERAPY | Facility: CLINIC | Age: 46
End: 2021-02-18
Payer: COMMERCIAL

## 2021-02-18 DIAGNOSIS — R79.89 ELEVATED SERUM CREATININE: Primary | ICD-10-CM

## 2021-02-18 NOTE — TELEPHONE ENCOUNTER
----- Message from Deborah Dorado DO sent at 2/18/2021  9:28 AM EST -----  Creatinine remains elevated but did improve  The elevation is not significantly high  I would like to recheck this lab in about six months  If patient is agreeable I will go ahead and place the order

## 2021-02-22 ENCOUNTER — APPOINTMENT (OUTPATIENT)
Dept: PHYSICAL THERAPY | Facility: CLINIC | Age: 46
End: 2021-02-22
Payer: COMMERCIAL

## 2021-02-25 ENCOUNTER — APPOINTMENT (OUTPATIENT)
Dept: PHYSICAL THERAPY | Facility: CLINIC | Age: 46
End: 2021-02-25
Payer: COMMERCIAL

## 2021-02-25 ENCOUNTER — OFFICE VISIT (OUTPATIENT)
Dept: PHYSICAL THERAPY | Facility: CLINIC | Age: 46
End: 2021-02-25
Payer: COMMERCIAL

## 2021-02-25 DIAGNOSIS — M17.12 PATELLOFEMORAL ARTHRITIS OF LEFT KNEE: Primary | ICD-10-CM

## 2021-02-25 PROCEDURE — 97110 THERAPEUTIC EXERCISES: CPT

## 2021-02-25 PROCEDURE — 97112 NEUROMUSCULAR REEDUCATION: CPT

## 2021-02-25 NOTE — PROGRESS NOTES
Daily Note     Today's date: 2021  Patient name: Ashleigh Bello  : 1975  MRN: [id number]  Referring provider: Sharon Jimenez MD  Dx:   Encounter Diagnosis     ICD-10-CM    1  Patellofemoral arthritis of left knee  M17 12        Start Time: 1615          Subjective: I'm seeing a nutritionist in order to lose some weight  Objective: See treatment diary below      Assessment: Tolerated treatment well  Patient exhibited good technique with therapeutic exercises and would benefit from continued PT      Plan: Progress treatment as tolerated         Precautions:   Past Medical History:   Diagnosis Date    Asthma            Manuals  1/12 1/14 1/25 2/4 2/15 2/25      L HS/quad/gastroc/ITB/ iliopsoas stretching ---- performed performed (B) performed perf perf perf (B)                                             Neuro Re-Ed             Neurac supine pelvic lift 2x5 ---           neurac Bridge 2x5 ---           Neurac SDLY Hip ABD 2x5 ---           Neurac SDLY Hip ADD 2x5 ----           Neurac Prone plank 2x5 ---                                     Ther Ex             Resisted lunges Red TB 20x --- ---   ----       Squats  20x 18"mat 20x 18" mat 2x10 reps 18"mat x 20 reps 20x low mat 20x low mat 20x low mat      Balance Lunge bosu 20x ---- --- --- --- --- ---      Step taps  2" 2x10 reps 2" 2 x 10 reps 2" 2 x 10 reps 2" x 20 reps 2" x 20 reps  2" x 20 reps       Wall slides  2x10 reps  2x10 reps 2x10 reps 2x10 reps --- ---      SLR  3#AK 2x10  --- -- --- --- ---      Hip abd  3#AK 2x10 reps --- --- --- --- ---      Bridging on air cushions  W/add & abd 4x10 reps 4x10 reps 4x10 reps 4x10 reps 4x10 reps  4x10 reps      L SLS rebounder ---- ---- 22 reps  30 reps ----- 30 reps  20 reps       [place] knee stabs (B) ---- --- @#4 - 4 x10 reps @#5 - 4 x 20 reps (B) @#5 - 4 x 20 reps (B) @#5 - 4 x 10 reps  @#5 - 4 x 10 reps (B)                   Gait Training                                       Modalities

## 2021-03-01 ENCOUNTER — OFFICE VISIT (OUTPATIENT)
Dept: PHYSICAL THERAPY | Facility: CLINIC | Age: 46
End: 2021-03-01
Payer: COMMERCIAL

## 2021-03-01 DIAGNOSIS — M17.12 PATELLOFEMORAL ARTHRITIS OF LEFT KNEE: Primary | ICD-10-CM

## 2021-03-01 PROCEDURE — 97110 THERAPEUTIC EXERCISES: CPT

## 2021-03-01 PROCEDURE — 97112 NEUROMUSCULAR REEDUCATION: CPT

## 2021-03-01 NOTE — PROGRESS NOTES
Daily Note     Today's date: 3/1/2021  Patient name: Edwardo Melendez  : 1975  MRN: [id number]  Referring provider: Ghanshyam Todd MD  Dx:   Encounter Diagnosis     ICD-10-CM    1  Patellofemoral arthritis of left knee  M17 12        Start Time: 1648  Stop Time: 1720  Total time in clinic (min): 32 minutes    Subjective: My L leg feels very tight  Objective: See treatment diary below      Assessment: Tolerated treatment well  Patient demonstrated fatigue post treatment and would benefit from continued PT      Plan: Progress treatment as tolerated         Precautions:   Past Medical History:   Diagnosis Date    Asthma            Manuals  1/12 1/14 1/25 2/4 2/15 2/25 3/1     L HS/quad/gastroc/ITB/ iliopsoas stretching ---- performed performed (B) performed perf perf perf (B) perf (B)                                            Neuro Re-Ed             Neurac supine pelvic lift 2x5 ---           neurac Bridge 2x5 ---           Neurac SDLY Hip ABD 2x5 ---           Neurac SDLY Hip ADD 2x5 ----           Neurac Prone plank 2x5 ---                                     Ther Ex             Resisted lunges Red TB 20x --- ---   ----       Squats  20x 18"mat 20x 18" mat 2x10 reps 18"mat x 20 reps 20x low mat 20x low mat 20x low mat 20x low mat     Balance Lunge bosu 20x ---- --- --- --- --- --- --     Step taps  2" 2x10 reps 2" 2 x 10 reps 2" 2 x 10 reps 2" x 20 reps 2" x 20 reps  2" x 20 reps  2" x 30 reps      Wall slides  2x10 reps  2x10 reps 2x10 reps 2x10 reps --- --- --     SLR  3#AK 2x10  --- -- --- --- --- --     Hip abd  3#AK 2x10 reps --- --- --- --- --- --     Bridging on air cushions  W/add & abd 4x10 reps 4x10 reps 4x10 reps 4x10 reps 4x10 reps  4x10 reps 4x10 reps      L SLS rebounder ---- ---- 22 reps  30 reps ----- 30 reps  20 reps  20x      [place] knee stareal (B) ---- --- @#4 - 4 x10 reps @#5 - 4 x 20 reps (B) @#5 - 4 x 20 reps (B) @#5 - 4 x 10 reps  @#5 - 4 x 10 reps (B) @#5 - 4 x 10 reps (B) Gait Training                                       Modalities

## 2021-03-04 ENCOUNTER — OFFICE VISIT (OUTPATIENT)
Dept: PHYSICAL THERAPY | Facility: CLINIC | Age: 46
End: 2021-03-04
Payer: COMMERCIAL

## 2021-03-04 DIAGNOSIS — M17.12 PATELLOFEMORAL ARTHRITIS OF LEFT KNEE: Primary | ICD-10-CM

## 2021-03-04 PROCEDURE — 97112 NEUROMUSCULAR REEDUCATION: CPT

## 2021-03-04 PROCEDURE — 97110 THERAPEUTIC EXERCISES: CPT

## 2021-03-04 NOTE — PROGRESS NOTES
Daily Note     Today's date: 3/4/2021  Patient name: Maximilian Ames  : 1975  MRN: [id number]  Referring provider: Thomas Rosdao MD  Dx:   Encounter Diagnosis     ICD-10-CM    1  Patellofemoral arthritis of left knee  M17 12        Start Time: 1530  Stop Time: 1600  Total time in clinic (min): 30 minutes    Subjective: B legs feel tight today  I'm very tired  Objective: See treatment diary below      Assessment: Tolerated treatment fair  Patient demonstrated fatigue post treatment and would benefit from continued PT      Plan: Progress treatment as tolerated         Precautions:   Past Medical History:   Diagnosis Date    Asthma            Manuals  1/12 1/14 1/25 2/4 2/15 2/25 3/4     L HS/quad/gastroc/ITB/ iliopsoas stretching ---- performed performed (B) performed perf perf perf (B) perf (B)                                            Neuro Re-Ed             Neurac supine pelvic lift 2x5 ---           neurac Bridge 2x5 ---           Neurac SDLY Hip ABD 2x5 ---           Neurac SDLY Hip ADD 2x5 ----           Neurac Prone plank 2x5 ---                                     Ther Ex             Resisted lunges Red TB 20x --- ---   ----       Squats  20x 18"mat 20x 18" mat 2x10 reps 18"mat x 20 reps 20x low mat 20x low mat 20x low mat 20x low mat     Balance Lunge bosu 20x ---- --- --- --- --- --- --     Step taps  2" 2x10 reps 2" 2 x 10 reps 2" 2 x 10 reps 2" x 20 reps 2" x 20 reps  2" x 20 reps  2" x 30 reps      Wall slides  2x10 reps  2x10 reps 2x10 reps 2x10 reps --- --- --     SLR  3#AK 2x10  --- -- --- --- --- --     Hip abd  3#AK 2x10 reps --- --- --- --- --- --     Bridging on air cushions  W/add & abd 4x10 reps 4x10 reps 4x10 reps 4x10 reps 4x10 reps  4x10 reps 4x10 reps      L SLS rebounder ---- ---- 22 reps  30 reps ----- 30 reps  20 reps  20x      [place] knee stabs (B) ---- --- @#4 - 4 x10 reps @#5 - 4 x 20 reps (B) @#5 - 4 x 20 reps (B) @#5 - 4 x 10 reps  @#5 - 4 x 10 reps (B) @#5 - 4 x 10 reps (B)                  Gait Training                                       Modalities

## 2021-03-08 ENCOUNTER — APPOINTMENT (OUTPATIENT)
Dept: PHYSICAL THERAPY | Facility: CLINIC | Age: 46
End: 2021-03-08
Payer: COMMERCIAL

## 2021-03-11 ENCOUNTER — OFFICE VISIT (OUTPATIENT)
Dept: PHYSICAL THERAPY | Facility: CLINIC | Age: 46
End: 2021-03-11
Payer: COMMERCIAL

## 2021-03-11 DIAGNOSIS — M17.12 PATELLOFEMORAL ARTHRITIS OF LEFT KNEE: Primary | ICD-10-CM

## 2021-03-11 PROCEDURE — 97110 THERAPEUTIC EXERCISES: CPT

## 2021-03-11 PROCEDURE — 97112 NEUROMUSCULAR REEDUCATION: CPT

## 2021-03-11 NOTE — PROGRESS NOTES
Daily Note     Today's date: 3/11/2021  Patient name: Chuyita Kendall  : 1975  MRN: [id number]  Referring provider: Jane Schneider MD  Dx:   Encounter Diagnosis     ICD-10-CM    1  Patellofemoral arthritis of left knee  M17 12        Start Time: 1610          Subjective: I feel stronger & I'm trying to lose weight  Objective: See treatment diary below      Assessment: Tolerated treatment well   Patient exhibited good technique with therapeutic exercises      Plan: D/C with [facility]     Precautions:   Past Medical History:   Diagnosis Date    Asthma            Manuals  1/12 1/14 1/25 2/4 2/15 2/25 3/11     L HS/quad/gastroc/ITB/ iliopsoas stretching ---- performed performed (B) performed perf perf perf (B) perf (B)                                            Neuro Re-Ed             Neurac supine pelvic lift 2x5 ---           neurac Bridge 2x5 ---           Neurac SDLY Hip ABD 2x5 ---           Neurac SDLY Hip ADD 2x5 ----           Neurac Prone plank 2x5 ---                                     Ther Ex             Resisted lunges Red TB 20x --- ---   ----       Squats  20x 18"mat 20x 18" mat 2x10 reps 18"mat x 20 reps 20x low mat 20x low mat 20x low mat 20x low mat     Balance Lunge bosu 20x ---- --- --- --- --- --- --     Step taps  2" 2x10 reps 2" 2 x 10 reps 2" 2 x 10 reps 2" x 20 reps 2" x 20 reps  2" x 20 reps  2" x 30 reps      Wall slides  2x10 reps  2x10 reps 2x10 reps 2x10 reps --- --- --     SLR  3#AK 2x10  --- -- --- --- --- --     Hip abd  3#AK 2x10 reps --- --- --- --- --- --     Bridging on air cushions  W/add & abd 4x10 reps 4x10 reps 4x10 reps 4x10 reps 4x10 reps  4x10 reps 4x10 reps      L SLS rebounder ---- ---- 22 reps  30 reps ----- 30 reps  20 reps  20x      [place] knee stabs (B) ---- --- @#4 - 4 x10 reps @#5 - 4 x 20 reps (B) @#5 - 4 x 20 reps (B) @#5 - 4 x 10 reps  @#5 - 4 x 10 reps (B) @#5 - 4 x 10 reps (B)                  Gait Training Modalities

## 2021-03-12 ENCOUNTER — TELEPHONE (OUTPATIENT)
Dept: OBGYN CLINIC | Facility: HOSPITAL | Age: 46
End: 2021-03-12

## 2021-03-12 NOTE — TELEPHONE ENCOUNTER
Patient sees Dr Cameron Mercedes    Patient called to order injections for her knee  She also wanted to let the doctor know she completed physical therapy      Call back # [phone]

## 2021-03-15 DIAGNOSIS — M17.12 PATELLOFEMORAL ARTHRITIS OF LEFT KNEE: Primary | ICD-10-CM

## 2021-03-15 NOTE — TELEPHONE ENCOUNTER
This injection was ordered in December  Can we see what happened? Can you forward to the authorization team as I can't find their name in the pool?

## 2021-03-15 NOTE — TELEPHONE ENCOUNTER
Sandra Silverio is the order in patient's chart still good? If so can we start auth process? Let me know if I need to do anything       Thanks

## 2021-03-16 NOTE — TELEPHONE ENCOUNTER
The last order was closed due to the patient cancelling the order with the speciality pharmacy because of her high co-pay  I called Gael re-opened the script and made the patient aware

## 2021-03-30 ENCOUNTER — OFFICE VISIT (OUTPATIENT)
Dept: OBGYN CLINIC | Facility: HOSPITAL | Age: 46
End: 2021-03-30
Payer: COMMERCIAL

## 2021-03-30 VITALS
SYSTOLIC BLOOD PRESSURE: 151 MMHG | HEART RATE: 81 BPM | DIASTOLIC BLOOD PRESSURE: 84 MMHG | BODY MASS INDEX: 37.69 KG/M2 | HEIGHT: 65 IN | WEIGHT: 226.2 LBS

## 2021-03-30 DIAGNOSIS — M17.12 PRIMARY OSTEOARTHRITIS OF LEFT KNEE: Primary | ICD-10-CM

## 2021-03-30 PROCEDURE — 20610 DRAIN/INJ JOINT/BURSA W/O US: CPT | Performed by: PHYSICIAN ASSISTANT

## 2021-03-30 PROCEDURE — 3008F BODY MASS INDEX DOCD: CPT | Performed by: INTERNAL MEDICINE

## 2021-03-30 NOTE — PROGRESS NOTES
S: Veronica Wren presents to the office for SynVisc One injection for her left knee  She has had several steroid injections and Viscoelastic supplementation in the past with relief  Pain occurs with kneeling and   Pain is localized over the medial and lateral aspect of the knee  Pain improves with rest   Denies swelling    /84   Pulse 81   Ht 5' 5" (1 651 m)   Wt 103 kg (226 lb 3 2 oz)   BMI 37 64 kg/m²     O: Left knee  Skin: warm and dry  TTP: medial and lateral joint line  No instability with varus/valgus stress  ROM: full  Swelling: soft tissue  Effusion: no effusion  NVI  SI    A/P: Left knee primary osteoarthritis  1  SynVisc One - left knee  2  Refrain from impact activities x 24-48 hours  3  Slowly increase activities to tolerance  4  Tylenol PRN  5  Ice PRN  6  Follow up as needed    Large joint arthrocentesis: L knee  Universal Protocol:  Consent: Verbal consent obtained    Consent given by: patient    Supporting Documentation  Indications: pain   Procedure Details  Location: knee - L knee  Needle size: 18 G  Ultrasound guidance: no  Medications administered: 48 mg hylan 48 MG/6ML  Specialty Pharmacy Supplied: received medications from pharmacy  Patient tolerance: patient tolerated the procedure well with no immediate complications  Dressing:  Sterile dressing applied    3 ml of 1% lidocaine used for anesthesia as patient does not like Ethyl Chloride Spray

## 2021-07-23 DIAGNOSIS — J45.909 ASTHMA, UNSPECIFIED ASTHMA SEVERITY, UNSPECIFIED WHETHER COMPLICATED, UNSPECIFIED WHETHER PERSISTENT: ICD-10-CM

## 2021-07-26 RX ORDER — FLUTICASONE FUROATE AND VILANTEROL TRIFENATATE 100; 25 UG/1; UG/1
POWDER RESPIRATORY (INHALATION)
Qty: 60 BLISTER | Refills: 5 | Status: SHIPPED | OUTPATIENT
Start: 2021-07-26 | End: 2022-02-04

## 2021-09-23 ENCOUNTER — OFFICE VISIT (OUTPATIENT)
Dept: GASTROENTEROLOGY | Facility: CLINIC | Age: 46
End: 2021-09-23
Payer: COMMERCIAL

## 2021-09-23 VITALS
WEIGHT: 214 LBS | HEART RATE: 67 BPM | HEIGHT: 65 IN | SYSTOLIC BLOOD PRESSURE: 123 MMHG | DIASTOLIC BLOOD PRESSURE: 84 MMHG | TEMPERATURE: 97.6 F | BODY MASS INDEX: 35.65 KG/M2

## 2021-09-23 DIAGNOSIS — Z80.0 FAMILY HISTORY OF COLON CANCER: Primary | ICD-10-CM

## 2021-09-23 DIAGNOSIS — K21.9 GASTROESOPHAGEAL REFLUX DISEASE WITHOUT ESOPHAGITIS: ICD-10-CM

## 2021-09-23 PROCEDURE — 1036F TOBACCO NON-USER: CPT | Performed by: INTERNAL MEDICINE

## 2021-09-23 PROCEDURE — 99204 OFFICE O/P NEW MOD 45 MIN: CPT | Performed by: INTERNAL MEDICINE

## 2021-09-23 PROCEDURE — 3008F BODY MASS INDEX DOCD: CPT | Performed by: INTERNAL MEDICINE

## 2021-09-23 RX ORDER — FAMOTIDINE 20 MG/1
20 TABLET, FILM COATED ORAL 2 TIMES DAILY
Qty: 60 TABLET | Refills: 11 | Status: SHIPPED | OUTPATIENT
Start: 2021-09-23

## 2021-09-23 RX ORDER — SODIUM PICOSULFATE, MAGNESIUM OXIDE, AND ANHYDROUS CITRIC ACID 10; 3.5; 12 MG/160ML; G/160ML; G/160ML
1 LIQUID ORAL SEE ADMIN INSTRUCTIONS
Qty: 320 ML | Refills: 0 | Status: SHIPPED | OUTPATIENT
Start: 2021-09-23 | End: 2021-11-29 | Stop reason: ALTCHOICE

## 2021-09-23 NOTE — PROGRESS NOTES
Consultation - [facility]  Marla Armas 1975 female         Chief Complaint:    GERD, family history of colon cancer    HPI:   49-year-old female with  History of GERD was referred for colonoscopy  Patient's father had colon cancer  Patient has regular bowel movements and denies any blood or mucus in the stool  Appetite is good and denies any recent weight loss  Denies any abdominal pain, nausea, or vomiting  Has   Problems with acid reflux for which she takes Prevacid or Nexium that she alternates  Reports having symptoms of vomiting and bad reflux if she stops the medication  Denies any difficulty swallowing  REVIEW OF SYSTEMS: Review of Systems   Constitutional: Negative for activity change, appetite change, chills, diaphoresis, fatigue, fever and unexpected weight change  HENT: Negative for ear discharge, ear pain, facial swelling, hearing loss, nosebleeds, sore throat, tinnitus and voice change  Eyes: Negative for pain, discharge, redness, itching and visual disturbance  Respiratory: Negative for apnea, cough, chest tightness, shortness of breath and wheezing  Cardiovascular: Negative for chest pain and palpitations  Gastrointestinal:        As noted in HPI   Endocrine: Negative for cold intolerance, heat intolerance and polyuria  Genitourinary: Negative for difficulty urinating, dysuria, flank pain, hematuria and urgency  Musculoskeletal: Negative for arthralgias, back pain, gait problem, joint swelling and myalgias  Skin: Negative for rash and wound  Neurological: Negative for dizziness, tremors, seizures, speech difficulty, light-headedness, numbness and headaches  Hematological: Negative for adenopathy  Does not bruise/bleed easily  Psychiatric/Behavioral: Negative for agitation, behavioral problems and confusion  The patient is not nervous/anxious           Past Medical History:   Diagnosis Date    Asthma       Past Surgical History:   Procedure Laterality Date     SECTION      ROTATOR CUFF REPAIR Right     TONSILLECTOMY       Social History     Socioeconomic History    Marital status: Unknown     Spouse name: Not on file    Number of children: Not on file    Years of education: Not on file    Highest education level: Not on file   Occupational History    Not on file   Tobacco Use    Smoking status: Never Smoker    Smokeless tobacco: Never Used   Vaping Use    Vaping Use: Never used   Substance and Sexual Activity    Alcohol use: Yes     Comment: social    Drug use: Never    Sexual activity: Yes     Partners: Male     Birth control/protection: OCP   Other Topics Concern    Not on file   Social History Narrative    Not on file     Social Determinants of Health     Financial Resource Strain:     Difficulty of Paying Living Expenses:    Food Insecurity:     Worried About Running Out of Food in the Last Year:     Ran Out of Food in the Last Year:    Transportation Needs:     Lack of Transportation (Medical):      Lack of Transportation (Non-Medical):    Physical Activity:     Days of Exercise per Week:     Minutes of Exercise per Session:    Stress:     Feeling of Stress :    Social Connections:     Frequency of Communication with Friends and Family:     Frequency of Social Gatherings with Friends and Family:     Attends Yazdanism Services:     Active Member of Clubs or Organizations:     Attends Club or Organization Meetings:     Marital Status:    Intimate Partner Violence:     Fear of Current or Ex-Partner:     Emotionally Abused:     Physically Abused:     Sexually Abused:      Family History   Problem Relation Age of Onset    Heart disease Mother     Coronary artery disease Mother     Colon cancer Father     Liver cancer Father     Hepatitis Father      Meloxicam  Current Outpatient Medications   Medication Sig Dispense Refill    albuterol (2 5 mg/3 mL) 0 083 % nebulizer solution Take 1 vial (2 5 mg total) by nebulization every 6 (six) hours as needed for wheezing or shortness of breath 90 vial 11    albuterol (PROVENTIL HFA,VENTOLIN HFA) 90 mcg/act inhaler Inhale 2 puffs every 6 (six) hours as needed for wheezing      Breo Ellipta 100-25 MCG/INH inhaler INHALE 1 PUFF DAILY RINSE MOUTH AFTER USE  60 blister 5    cetirizine (ZyrTEC) 10 mg tablet Take 10 mg by mouth daily      diclofenac (VOLTAREN) 75 mg EC tablet Take 75 mg by mouth daily      lansoprazole (PREVACID) 30 mg capsule Prevacid CPDR  TAKE 1 CAPSULE DAILY  Refills: 0       Active      Levonorgest-Eth Estrad 91-Day 0 15-0 03 &0 01 MG TABS Take 1 tablet by mouth daily  2    famotidine (PEPCID) 20 mg tablet Take 1 tablet (20 mg total) by mouth 2 (two) times a day 60 tablet 11    montelukast (SINGULAIR) 10 mg tablet Take 10 mg by mouth daily (Patient not taking: Reported on 9/23/2021)  2    Sod Picosulfate-Mag Ox-Cit Acd (Clenpiq) 10-3 5-12 MG-GM -GM/160ML SOLN Take 1 Package by mouth see administration instructions 320 mL 0     No current facility-administered medications for this visit  Blood pressure 123/84, pulse 67, temperature 97 6 °F (36 4 °C), height 5' 5" (1 651 m), weight 97 1 kg (214 lb)  PHYSICAL EXAM: Physical Exam  Constitutional:       Appearance: She is well-developed  HENT:      Head: Normocephalic and atraumatic  Nose: Nose normal    Eyes:      General: No scleral icterus  Right eye: No discharge  Left eye: No discharge  Conjunctiva/sclera: Conjunctivae normal    Neck:      Thyroid: No thyromegaly  Vascular: No JVD  Trachea: No tracheal deviation  Cardiovascular:      Rate and Rhythm: Normal rate and regular rhythm  Heart sounds: Normal heart sounds  No murmur heard  No friction rub  No gallop  Pulmonary:      Effort: Pulmonary effort is normal  No respiratory distress  Breath sounds: Normal breath sounds  No wheezing or rales  Chest:      Chest wall: No tenderness  Abdominal:      General: Bowel sounds are normal  There is no distension  Palpations: Abdomen is soft  There is no mass  Tenderness: There is no abdominal tenderness  There is no guarding or rebound  Hernia: No hernia is present  Musculoskeletal:         General: No tenderness or deformity  Cervical back: Neck supple  Lymphadenopathy:      Cervical: No cervical adenopathy  Skin:     General: Skin is warm and dry  Findings: No erythema or rash  Neurological:      Mental Status: She is alert and oriented to person, place, and time  Psychiatric:         Behavior: Behavior normal          Thought Content: Thought content normal           No results found for: WBC, HGB, HCT, MCV, PLT  Lab Results   Component Value Date    CALCIUM 8 8 12/21/2019    K 4 0 02/12/2021    CO2 20 02/12/2021     02/12/2021    BUN 12 02/12/2021    CREATININE 1 04 (H) 02/12/2021     Lab Results   Component Value Date    ALT 14 01/11/2021    AST 13 01/11/2021    ALKPHOS 94 12/21/2019     No results found for: INR, PROTIME    XR knee 3 vw left non injury    Result Date: 12/28/2020  Impression: No acute osseous abnormality  Moderate tricompartmental osteoarthritis, most pronounced in the patellofemoral compartment  Workstation performed: [id number]       ASSESSMENT & PLAN:    Gastroesophageal reflux disease without esophagitis    Gastroesophageal reflux disease - Patient has the symptoms of chronic acid reflux for a long time  Possible hiatal hernia or LES weakness  Should rule out Boyle's esophagus because of chronic symptoms         -  Discussed about the long-term side effects from proton pump inhibitors and advised her to try Pepcid instead    - schedule for EGD    - Patient was explained about the lifestyle and dietary modifications  Advised to avoid fatty foods, chocolates, caffeine, alcohol and any other triggering foods  Avoid eating for at least 3 hours before going to bed            Family history of colon cancer   Patient is at increased risk for colon cancer screening  Rule out colorectal lesions including polyps or malignancy       -Schedule for colonoscopy  -High-fiber diet     -Patient was given instructions about the colonoscopy prep     -Patient was explained about  the risks and benefits of the procedure  Risks including but not limited to bleeding, infection, perforation were explained in detail  Also explained about less than 100% sensitivity with the exam and other alternatives

## 2021-09-23 NOTE — ASSESSMENT & PLAN NOTE
Patient is at increased risk for colon cancer screening  Rule out colorectal lesions including polyps or malignancy       -Schedule for colonoscopy  -High-fiber diet     -Patient was given instructions about the colonoscopy prep     -Patient was explained about  the risks and benefits of the procedure  Risks including but not limited to bleeding, infection, perforation were explained in detail  Also explained about less than 100% sensitivity with the exam and other alternatives

## 2021-09-23 NOTE — ASSESSMENT & PLAN NOTE
Gastroesophageal reflux disease - Patient has the symptoms of chronic acid reflux for a long time  Possible hiatal hernia or LES weakness  Should rule out Boyle's esophagus because of chronic symptoms         -  Discussed about the long-term side effects from proton pump inhibitors and advised her to try Pepcid instead    - schedule for EGD    - Patient was explained about the lifestyle and dietary modifications  Advised to avoid fatty foods, chocolates, caffeine, alcohol and any other triggering foods  Avoid eating for at least 3 hours before going to bed

## 2021-10-25 DIAGNOSIS — M17.12 PATELLOFEMORAL ARTHRITIS OF LEFT KNEE: Primary | ICD-10-CM

## 2021-11-09 ENCOUNTER — PATIENT MESSAGE (OUTPATIENT)
Dept: OBGYN CLINIC | Facility: HOSPITAL | Age: 46
End: 2021-11-09

## 2021-11-18 ENCOUNTER — TELEPHONE (OUTPATIENT)
Dept: PREADMISSION TESTING | Facility: HOSPITAL | Age: 46
End: 2021-11-18

## 2021-11-18 VITALS — WEIGHT: 208 LBS | HEIGHT: 65 IN | BODY MASS INDEX: 34.66 KG/M2

## 2021-11-29 ENCOUNTER — HOSPITAL ENCOUNTER (OUTPATIENT)
Dept: GASTROENTEROLOGY | Facility: AMBULARY SURGERY CENTER | Age: 46
Setting detail: OUTPATIENT SURGERY
Discharge: HOME | End: 2021-11-29
Attending: INTERNAL MEDICINE | Admitting: INTERNAL MEDICINE
Payer: COMMERCIAL

## 2021-11-29 ENCOUNTER — ANESTHESIA (OUTPATIENT)
Dept: GASTROENTEROLOGY | Facility: AMBULARY SURGERY CENTER | Age: 46
End: 2021-11-29

## 2021-11-29 ENCOUNTER — ANESTHESIA EVENT (OUTPATIENT)
Dept: GASTROENTEROLOGY | Facility: AMBULARY SURGERY CENTER | Age: 46
End: 2021-11-29

## 2021-11-29 VITALS
TEMPERATURE: 98 F | WEIGHT: 210 LBS | DIASTOLIC BLOOD PRESSURE: 87 MMHG | OXYGEN SATURATION: 100 % | RESPIRATION RATE: 20 BRPM | HEART RATE: 80 BPM | HEIGHT: 66 IN | BODY MASS INDEX: 33.75 KG/M2 | SYSTOLIC BLOOD PRESSURE: 136 MMHG

## 2021-11-29 DIAGNOSIS — K21.9 GASTROESOPHAGEAL REFLUX DISEASE WITHOUT ESOPHAGITIS: ICD-10-CM

## 2021-11-29 DIAGNOSIS — Z80.0 FAMILY HISTORY OF COLON CANCER: ICD-10-CM

## 2021-11-29 PROCEDURE — 88342 IMHCHEM/IMCYTCHM 1ST ANTB: CPT | Performed by: PATHOLOGY

## 2021-11-29 PROCEDURE — 88305 TISSUE EXAM BY PATHOLOGIST: CPT | Performed by: PATHOLOGY

## 2021-11-29 PROCEDURE — 88341 IMHCHEM/IMCYTCHM EA ADD ANTB: CPT | Performed by: PATHOLOGY

## 2021-11-29 PROCEDURE — 43251 EGD REMOVE LESION SNARE: CPT | Performed by: INTERNAL MEDICINE

## 2021-11-29 PROCEDURE — 45385 COLONOSCOPY W/LESION REMOVAL: CPT | Performed by: INTERNAL MEDICINE

## 2021-11-29 RX ORDER — SODIUM CHLORIDE, SODIUM LACTATE, POTASSIUM CHLORIDE, CALCIUM CHLORIDE 600; 310; 30; 20 MG/100ML; MG/100ML; MG/100ML; MG/100ML
125 INJECTION, SOLUTION INTRAVENOUS CONTINUOUS
Status: DISCONTINUED | OUTPATIENT
Start: 2021-11-29 | End: 2021-12-03 | Stop reason: HOSPADM

## 2021-11-29 RX ORDER — PROPOFOL 10 MG/ML
INJECTION, EMULSION INTRAVENOUS CONTINUOUS PRN
Status: DISCONTINUED | OUTPATIENT
Start: 2021-11-29 | End: 2021-11-29

## 2021-11-29 RX ORDER — SODIUM CHLORIDE, SODIUM LACTATE, POTASSIUM CHLORIDE, CALCIUM CHLORIDE 600; 310; 30; 20 MG/100ML; MG/100ML; MG/100ML; MG/100ML
INJECTION, SOLUTION INTRAVENOUS CONTINUOUS PRN
Status: DISCONTINUED | OUTPATIENT
Start: 2021-11-29 | End: 2021-11-29

## 2021-11-29 RX ORDER — PROPOFOL 10 MG/ML
INJECTION, EMULSION INTRAVENOUS AS NEEDED
Status: DISCONTINUED | OUTPATIENT
Start: 2021-11-29 | End: 2021-11-29

## 2021-11-29 RX ADMIN — PROPOFOL 30 MG: 10 INJECTION, EMULSION INTRAVENOUS at 08:40

## 2021-11-29 RX ADMIN — LIDOCAINE HYDROCHLORIDE 40 MG: 20 INJECTION, SOLUTION INTRAVENOUS at 08:38

## 2021-11-29 RX ADMIN — PROPOFOL 150 MCG/KG/MIN: 10 INJECTION, EMULSION INTRAVENOUS at 08:38

## 2021-11-29 RX ADMIN — SODIUM CHLORIDE, SODIUM LACTATE, POTASSIUM CHLORIDE, AND CALCIUM CHLORIDE: .6; .31; .03; .02 INJECTION, SOLUTION INTRAVENOUS at 08:29

## 2021-11-29 RX ADMIN — PROPOFOL 200 MG: 10 INJECTION, EMULSION INTRAVENOUS at 08:38

## 2021-11-30 ENCOUNTER — PROCEDURE VISIT (OUTPATIENT)
Dept: OBGYN CLINIC | Facility: HOSPITAL | Age: 46
End: 2021-11-30
Payer: COMMERCIAL

## 2021-11-30 VITALS
DIASTOLIC BLOOD PRESSURE: 92 MMHG | WEIGHT: 210 LBS | HEIGHT: 65 IN | BODY MASS INDEX: 34.99 KG/M2 | HEART RATE: 92 BPM | SYSTOLIC BLOOD PRESSURE: 144 MMHG

## 2021-11-30 DIAGNOSIS — M17.12 PRIMARY OSTEOARTHRITIS OF LEFT KNEE: Primary | ICD-10-CM

## 2021-11-30 PROCEDURE — 20610 DRAIN/INJ JOINT/BURSA W/O US: CPT | Performed by: PHYSICIAN ASSISTANT

## 2021-11-30 RX ORDER — LIDOCAINE HYDROCHLORIDE 10 MG/ML
3 INJECTION, SOLUTION INFILTRATION; PERINEURAL
Status: COMPLETED | OUTPATIENT
Start: 2021-11-30 | End: 2021-11-30

## 2021-11-30 RX ADMIN — LIDOCAINE HYDROCHLORIDE 3 ML: 10 INJECTION, SOLUTION INFILTRATION; PERINEURAL at 15:41

## 2021-12-02 ENCOUNTER — OFFICE VISIT (OUTPATIENT)
Dept: FAMILY MEDICINE CLINIC | Facility: MEDICAL CENTER | Age: 46
End: 2021-12-02
Payer: COMMERCIAL

## 2021-12-02 VITALS
DIASTOLIC BLOOD PRESSURE: 86 MMHG | BODY MASS INDEX: 35.65 KG/M2 | HEART RATE: 70 BPM | TEMPERATURE: 99.9 F | WEIGHT: 214 LBS | HEIGHT: 65 IN | SYSTOLIC BLOOD PRESSURE: 138 MMHG | RESPIRATION RATE: 18 BRPM

## 2021-12-02 DIAGNOSIS — Z13.220 SCREENING FOR LIPID DISORDERS: ICD-10-CM

## 2021-12-02 DIAGNOSIS — R79.89 ELEVATED SERUM CREATININE: ICD-10-CM

## 2021-12-02 DIAGNOSIS — Z00.00 PHYSICAL EXAM, ANNUAL: Primary | ICD-10-CM

## 2021-12-02 DIAGNOSIS — Z23 ENCOUNTER FOR IMMUNIZATION: ICD-10-CM

## 2021-12-02 DIAGNOSIS — Z13.1 SCREENING FOR DIABETES MELLITUS: ICD-10-CM

## 2021-12-02 PROCEDURE — 3008F BODY MASS INDEX DOCD: CPT | Performed by: FAMILY MEDICINE

## 2021-12-02 PROCEDURE — 3725F SCREEN DEPRESSION PERFORMED: CPT | Performed by: FAMILY MEDICINE

## 2021-12-02 PROCEDURE — 99396 PREV VISIT EST AGE 40-64: CPT | Performed by: FAMILY MEDICINE

## 2021-12-02 PROCEDURE — 1036F TOBACCO NON-USER: CPT | Performed by: FAMILY MEDICINE

## 2021-12-10 ENCOUNTER — IMMUNIZATIONS (OUTPATIENT)
Dept: FAMILY MEDICINE CLINIC | Facility: HOSPITAL | Age: 46
End: 2021-12-10

## 2021-12-10 DIAGNOSIS — Z23 ENCOUNTER FOR IMMUNIZATION: Primary | ICD-10-CM

## 2021-12-10 PROCEDURE — 0064A COVID-19 MODERNA VACC 0.25 ML BOOSTER: CPT

## 2021-12-10 PROCEDURE — 91306 COVID-19 MODERNA VACC 0.25 ML BOOSTER: CPT

## 2022-02-03 DIAGNOSIS — J45.909 ASTHMA, UNSPECIFIED ASTHMA SEVERITY, UNSPECIFIED WHETHER COMPLICATED, UNSPECIFIED WHETHER PERSISTENT: ICD-10-CM

## 2022-02-04 RX ORDER — FLUTICASONE FUROATE AND VILANTEROL TRIFENATATE 100; 25 UG/1; UG/1
POWDER RESPIRATORY (INHALATION)
Qty: 60 EACH | Refills: 5 | Status: SHIPPED | OUTPATIENT
Start: 2022-02-04

## 2022-06-08 ENCOUNTER — TELEPHONE (OUTPATIENT)
Dept: OBGYN CLINIC | Facility: HOSPITAL | Age: 47
End: 2022-06-08

## 2022-06-08 DIAGNOSIS — M17.12 PATELLOFEMORAL ARTHRITIS OF LEFT KNEE: Primary | ICD-10-CM

## 2022-06-08 NOTE — TELEPHONE ENCOUNTER
Patient came into office today stating she would like an order put in for gel injection (the one series) on left knee  Please advise  Call back number: # [phone]      Thank you

## 2022-07-21 ENCOUNTER — PROCEDURE VISIT (OUTPATIENT)
Dept: OBGYN CLINIC | Facility: OTHER | Age: 47
End: 2022-07-21
Payer: COMMERCIAL

## 2022-07-21 VITALS
BODY MASS INDEX: 35.63 KG/M2 | WEIGHT: 213.85 LBS | SYSTOLIC BLOOD PRESSURE: 132 MMHG | DIASTOLIC BLOOD PRESSURE: 81 MMHG | HEIGHT: 65 IN | HEART RATE: 102 BPM

## 2022-07-21 DIAGNOSIS — M17.12 PATELLOFEMORAL ARTHRITIS OF LEFT KNEE: Primary | ICD-10-CM

## 2022-07-21 PROCEDURE — 20610 DRAIN/INJ JOINT/BURSA W/O US: CPT | Performed by: PHYSICIAN ASSISTANT

## 2022-07-21 RX ORDER — MONTELUKAST SODIUM 10 MG/1
10 TABLET ORAL DAILY
COMMUNITY
Start: 2022-05-20

## 2022-07-21 RX ORDER — NEOMYCIN SULFATE, POLYMYXIN B SULFATE, AND DEXAMETHASONE 3.5; 10000; 1 MG/G; [USP'U]/G; MG/G
OINTMENT OPHTHALMIC
COMMUNITY
Start: 2022-07-12 | End: 2022-10-06 | Stop reason: ALTCHOICE

## 2022-07-21 RX ORDER — MELOXICAM 15 MG/1
TABLET ORAL
COMMUNITY
Start: 2022-07-18 | End: 2022-10-27 | Stop reason: ALTCHOICE

## 2022-07-21 NOTE — PROGRESS NOTES
S: Louie Hanna presents to the office for Synvisc One injection  Reports increase in left knee pain  Some pain with ambulation and relieved with rest   Admits to swelling last [place] the beach  Denies new injury or trauma  Reports good relief with prior Synvisc injections  /81 (BP Location: Left arm, Patient Position: Sitting, Cuff Size: Adult)   Pulse 102   Ht 5' 5" (1 651 m)   Wt 97 kg (213 lb 13 5 oz)   BMI 35 59 kg/m²     O: Left knee  Skin - warm and dry, intact  Good knee range of motion  No crepitation with motion  No swelling  No effusion  NVI  SILT    A/P: Left knee osteoarthritis  1  Synvisc one injection  2  Follow up PRN  3  Slowly resume activities to tolerance over the next 48 hours  4  Tylenol and ICE prn      Large joint arthrocentesis: L knee  Universal Protocol:  Consent: Verbal consent obtained    Consent given by: patient    Supporting Documentation  Indications: pain   Procedure Details  Location: knee - L knee  Needle size: 18 G  Ultrasound guidance: no  Approach: superior lateral   Medications administered: 48 mg hylan 48 MG/6ML    Patient tolerance: patient tolerated the procedure well with no immediate complications  Dressing:  Sterile dressing applied

## 2022-08-29 ENCOUNTER — TELEPHONE (OUTPATIENT)
Dept: PULMONOLOGY | Facility: CLINIC | Age: 47
End: 2022-08-29

## 2022-08-29 DIAGNOSIS — J45.909 ASTHMA, UNSPECIFIED ASTHMA SEVERITY, UNSPECIFIED WHETHER COMPLICATED, UNSPECIFIED WHETHER PERSISTENT: ICD-10-CM

## 2022-08-29 NOTE — TELEPHONE ENCOUNTER
Patient Cindy Chase is requesting a refill for     Medication(s)  name Breo inhaler     Dose 100-25 mch/inh    Last office Visit Scheduled for 9/2/22    Pharmacy [facility] in [place] [address] (30 day or 90 day) 30d

## 2022-08-30 DIAGNOSIS — J45.909 ASTHMA, UNSPECIFIED ASTHMA SEVERITY, UNSPECIFIED WHETHER COMPLICATED, UNSPECIFIED WHETHER PERSISTENT: ICD-10-CM

## 2022-09-02 ENCOUNTER — OFFICE VISIT (OUTPATIENT)
Dept: PULMONOLOGY | Facility: CLINIC | Age: 47
End: 2022-09-02
Payer: COMMERCIAL

## 2022-09-02 VITALS
WEIGHT: 230 LBS | TEMPERATURE: 97 F | BODY MASS INDEX: 38.32 KG/M2 | HEART RATE: 83 BPM | SYSTOLIC BLOOD PRESSURE: 158 MMHG | HEIGHT: 65 IN | RESPIRATION RATE: 18 BRPM | OXYGEN SATURATION: 96 % | DIASTOLIC BLOOD PRESSURE: 100 MMHG

## 2022-09-02 DIAGNOSIS — J45.909 ASTHMA, UNSPECIFIED ASTHMA SEVERITY, UNSPECIFIED WHETHER COMPLICATED, UNSPECIFIED WHETHER PERSISTENT: Primary | ICD-10-CM

## 2022-09-02 PROCEDURE — 99214 OFFICE O/P EST MOD 30 MIN: CPT | Performed by: NURSE PRACTITIONER

## 2022-09-02 NOTE — ASSESSMENT & PLAN NOTE
-  patient appeared to be at her respiratory baseline without acute exacerbation  -  reports being diagnosed with asthma at the age of 30 after initiating her job at [employer]  -  do feel patient's asthma is likely very mild  -  patient feels significant benefit since initiating Breo  -  will continue home regimen:  Breo 100/25 mcg 1 puff daily, singular daily, Zyrtec daily  -  recently initiated nasal spray as needed for increased sinus congestion  -  patient reports previous allergy shots per allergist were not beneficial, will obtain CBC with diff and [facility] panel for baseline  -  no indication to increased medication regimen at this time  -  no refills required  -  will follow-up in 1 year

## 2022-09-02 NOTE — PROGRESS NOTES
Pulmonary Follow-Up Note   Ximena Oliva 50 y o  female MRN: [id number]  9/2/2022      Assessment/Plan:    Problem List Items Addressed This Visit        Respiratory    Asthma - Primary     -  patient appeared to be at her respiratory baseline without acute exacerbation  -  reports being diagnosed with asthma at the age of 30 after initiating her job at [employer]  -  do feel patient's asthma is likely very mild  -  patient feels significant benefit since initiating Breo  -  will continue home regimen:  Breo 100/25 mcg 1 puff daily, singular daily, Zyrtec daily  -  recently initiated nasal spray as needed for increased sinus congestion  -  patient reports previous allergy shots per allergist were not beneficial, will obtain CBC with diff and [facility] allergy panel for baseline  -  no indication to increased medication regimen at this time  -  no refills required  -  will follow-up in 1 year  History of Present Illness   Reason for Visit:  1 year follow-up  Chief Complaint: "I feel really good"  HPI: Ana Lopez is a 50 y o  female who presents to the office today for 1 year follow-up  Patient reports that she was diagnosed with asthma at the age of 30 at which time she started seeing an allergist and was initiated on allergy shots which were not beneficial   Patient started seen Dr Yareli Andrea at which time she was initiated on Breo singular and Zyrtec which she feels her respiratory symptoms have significantly improved since maintenance inhaler  Patient reports that approximately 6 months ago she began using nasal spray which significantly improved her sinus congestion and snoring at night  Patient reports using her albuterol nebulizer or MDI approximately once every 6 months  Patient reports that her triggers include animals especially cats dust and old leaves  I will order CBC with diff and [facility] allergy panel for baseline eosinophil levels and IgE    Patient's asthma is very well controlled and will follow-up in 1 year's time  Patient required no refills at this visit  Review of Systems   Constitutional: Negative for chills and fever  HENT: Negative for ear pain and sore throat  Eyes: Negative for pain and visual disturbance  Respiratory: Negative for apnea, cough, choking, chest tightness, shortness of breath, wheezing and stridor  Cardiovascular: Negative for chest pain and palpitations  Gastrointestinal: Negative for abdominal pain and vomiting  Genitourinary: Negative for dysuria and hematuria  Musculoskeletal: Negative for arthralgias and back pain  Skin: Negative for color change and rash  Neurological: Negative for dizziness, seizures and syncope  Psychiatric/Behavioral: Negative for agitation and behavioral problems  All other systems reviewed and are negative  Historical Information   Past Medical History:   Diagnosis Date    Arthritis     Asthma     Contact lens/glasses fitting     Family history of colon cancer     hep C-liver cancer- to colon cancer-father    GERD (gastroesophageal reflux disease)     Obesity (BMI 30 0-34  9)      Past Surgical History:   Procedure Laterality Date     SECTION      ,     DILATION AND CURETTAGE OF UTERUS      miscarriage    EGD      ROTATOR CUFF REPAIR Right     TONSILLECTOMY      TUBAL LIGATION  2008     Family History   Problem Relation Age of Onset    Heart disease Mother         pacemaker    Coronary artery disease Mother     Hypertension Mother         pulmonary    Colon cancer Father     Liver cancer Father     Hepatitis Father     Cancer Father         liver, colon-started w/ Hep C     Social History   Social History     Substance and Sexual Activity   Alcohol Use Yes    Comment: social     Social History     Substance and Sexual Activity   Drug Use Never     Social History     Tobacco Use   Smoking Status Never Smoker   Smokeless Tobacco Never Used E-Cigarette/Vaping    E-Cigarette Use Never User      E-Cigarette/Vaping Substances    Nicotine No     THC No     CBD No     Flavoring No     Other No     Unknown No        Meds/Allergies     Current Outpatient Medications:     cetirizine (ZyrTEC) 10 mg tablet, Take 10 mg by mouth daily, Disp: , Rfl:     famotidine (PEPCID) 20 mg tablet, Take 1 tablet (20 mg total) by mouth 2 (two) times a day, Disp: 60 tablet, Rfl: 11    fluticasone-vilanterol (Breo Ellipta) 100-25 mcg/inh inhaler, Inhale 1 puff daily Rinse mouth after use , Disp: 60 blister, Rfl: 0    lansoprazole (PREVACID) 30 mg capsule, as needed  , Disp: , Rfl:     Levonorgest-Eth Estrad 91-Day 0 15-0 03 &0 01 MG TABS, Take 1 tablet by mouth every morning  , Disp: , Rfl: 2    meloxicam (MOBIC) 15 mg tablet, , Disp: , Rfl:     montelukast (SINGULAIR) 10 mg tablet, Take 10 mg by mouth daily, Disp: , Rfl:     albuterol (2 5 mg/3 mL) 0 083 % nebulizer solution, Take 1 vial (2 5 mg total) by nebulization every 6 (six) hours as needed for wheezing or shortness of breath (Patient not taking: Reported on 9/2/2022), Disp: 90 vial, Rfl: 11    albuterol (PROVENTIL HFA,VENTOLIN HFA) 90 mcg/act inhaler, Inhale 2 puffs every 6 (six) hours as needed for wheezing (Patient not taking: Reported on 9/2/2022), Disp: , Rfl:     diclofenac (VOLTAREN) 75 mg EC tablet, Take 75 mg by mouth as needed   (Patient not taking: Reported on 9/2/2022), Disp: , Rfl:     neomycin-polymyxin-dexamethasone (MAXITROL) 0 35%-10,000 units/g-0 1%, APPLY A SMALL AMOUNT ON EYELID AT BEDTIME AS DIRECTED (Patient not taking: Reported on 9/2/2022), Disp: , Rfl:   Allergies   Allergen Reactions    Meloxicam Other (See Comments)     Swelling in legs        Vitals: Blood pressure 158/100, pulse 83, temperature (!) 97 °F (36 1 °C), temperature source Tympanic, resp  rate 18, height 5' 5" (1 651 m), weight 104 kg (230 lb), SpO2 96 %, not currently breastfeeding   Body mass index is 38 27 kg/m²  Oxygen Therapy  SpO2: 96 %  Oxygen Therapy: None (Room air)    Physical Exam:  Physical Exam  Constitutional:       General: She is not in acute distress  Appearance: Normal appearance  She is normal weight  She is not ill-appearing  HENT:      Head: Normocephalic and atraumatic  Nose: Nose normal  No congestion  Mouth/Throat:      Mouth: Mucous membranes are dry  Pharynx: No oropharyngeal exudate or posterior oropharyngeal erythema  Cardiovascular:      Rate and Rhythm: Normal rate and regular rhythm  Pulses: Normal pulses  Heart sounds: Normal heart sounds  No murmur heard  No friction rub  No gallop  Pulmonary:      Effort: Pulmonary effort is normal  No tachypnea, bradypnea, accessory muscle usage or respiratory distress  Breath sounds: Normal breath sounds  No stridor, decreased air movement or transmitted upper airway sounds  No decreased breath sounds, wheezing, rhonchi or rales  Comments: Clear breath sounds  Chest:      Chest wall: No tenderness  Abdominal:      General: Abdomen is flat  Bowel sounds are normal  There is no distension  Palpations: Abdomen is soft  There is no mass  Musculoskeletal:         General: No swelling or tenderness  Normal range of motion  Cervical back: Normal range of motion  No rigidity or tenderness  Skin:     General: Skin is warm and dry  Coloration: Skin is not jaundiced or pale  Neurological:      General: No focal deficit present  Mental Status: She is alert and oriented to person, place, and time  Mental status is at baseline     Psychiatric:         Mood and Affect: Mood normal          Behavior: Behavior normal              Imaging and other studies: I have personally reviewed pertinent films in PACS     Chest x-ray 12/1/2015- no acute cardiopulmonary process    Pulmonary Results (PFTs, PSG): I have personally reviewed pertinent films in PACS     No PFTs recorded        Gigi Bell CRNP  [facility]        Portions of the record may have been created with voice recognition software  Occasional wrong word or "sound a like" substitutions may have occurred due to the inherent limitations of voice recognition software  Read the chart carefully and recognize, using context, where substitutions have occurred or contact the dictating provider

## 2022-09-29 DIAGNOSIS — J45.909 ASTHMA, UNSPECIFIED ASTHMA SEVERITY, UNSPECIFIED WHETHER COMPLICATED, UNSPECIFIED WHETHER PERSISTENT: ICD-10-CM

## 2022-10-06 ENCOUNTER — OFFICE VISIT (OUTPATIENT)
Dept: OBGYN CLINIC | Facility: OTHER | Age: 47
End: 2022-10-06
Payer: COMMERCIAL

## 2022-10-06 VITALS
SYSTOLIC BLOOD PRESSURE: 147 MMHG | WEIGHT: 230.4 LBS | HEART RATE: 83 BPM | DIASTOLIC BLOOD PRESSURE: 89 MMHG | HEIGHT: 65 IN | BODY MASS INDEX: 38.39 KG/M2

## 2022-10-06 DIAGNOSIS — M17.12 PATELLOFEMORAL ARTHRITIS OF LEFT KNEE: Primary | ICD-10-CM

## 2022-10-06 PROCEDURE — 99213 OFFICE O/P EST LOW 20 MIN: CPT | Performed by: PHYSICIAN ASSISTANT

## 2022-10-06 RX ORDER — KETOCONAZOLE 20 MG/ML
SHAMPOO TOPICAL
COMMUNITY
Start: 2022-09-29 | End: 2022-10-27 | Stop reason: ALTCHOICE

## 2022-10-06 RX ORDER — PIMECROLIMUS 10 MG/G
CREAM TOPICAL
COMMUNITY
Start: 2022-09-01

## 2022-10-06 RX ORDER — FLUCONAZOLE 150 MG/1
TABLET ORAL
COMMUNITY
Start: 2022-10-03

## 2022-10-06 RX ORDER — AMOXICILLIN AND CLAVULANATE POTASSIUM 875; 125 MG/1; MG/1
TABLET, FILM COATED ORAL
COMMUNITY
Start: 2022-10-03 | End: 2022-10-27 | Stop reason: ALTCHOICE

## 2022-10-06 NOTE — PROGRESS NOTES
Assessment  Diagnoses and all orders for this visit:    Patellofemoral arthritis of left knee      Discussion and Plan:    Ynes Calvin continues to have pain refractory to steroid injections and Synvisc One injections  She will continue with her current HEP and activities to tolerance  She can alter cardio to non-impact activities such as recumbent bike to minimize stress on knee  Ynes Calvin states she is eager to seek definitive treatment for her left knee  A MRI will be obtained to determine if we have something arthroscopic to offer Alyce prior to referral to a joint replacement specialist     Subjective:   Patient ID: Roge Briseno is a 50 y o  female      Ynes Calvin presents to the office with complaints of left knee pain and swelling  Alyce was last seen in July for Synvisc injection  She reports good but temporary relief (2 months) with that injection  Steroid injections stopped working years ago for the left knee  Pain increased over the last few weeks after walking regularly with her sister  Pain is located over the medial joint line with burning pain across the anterior aspect of the knee  Pain is worse with ambulation  Pain improves with rest   Alyce notes increased swelling of the left knee over the last few weeks  She denies mechanical symptoms  Denies new injury or trauma  The following portions of the patient's history were reviewed and updated as appropriate: allergies, current medications, past family history, past medical history, past social history, past surgical history and problem list     Review of Systems   Constitutional: Negative for chills and fever  HENT: Negative for hearing loss  Eyes: Negative for visual disturbance  Respiratory: Negative for shortness of breath  Cardiovascular: Negative for chest pain  Gastrointestinal: Negative for abdominal pain  Musculoskeletal:        As reviewed in the HPI   Skin: Negative for rash     Neurological:        As reviewed in the HPI Psychiatric/Behavioral: Negative for agitation  Objective:  /89 (BP Location: Left arm, Patient Position: Sitting, Cuff Size: Adult)   Pulse 83   Ht 5' 5" (1 651 m)   Wt 105 kg (230 lb 6 4 oz)   BMI 38 34 kg/m²       Left Knee Exam     Muscle Strength   The patient has normal left knee strength  Tenderness   The patient is experiencing tenderness in the medial joint line  Range of Motion   The patient has normal left knee ROM  Tests   Mary:  Medial - positive   Varus: negative   Pivot shift: negative  Patellar apprehension: negative    Other   Erythema: absent  Sensation: normal  Pulse: present  Swelling: mild  Effusion: no effusion present    Comments:  +patellar grind            Physical Exam  Constitutional:       Appearance: She is well-developed  HENT:      Head: Normocephalic  Pulmonary:      Breath sounds: Normal breath sounds  No wheezing  Musculoskeletal:      Cervical back: Normal range of motion  Left knee: No effusion  Instability Tests: Medial Mary test positive  Skin:     General: Skin is warm and dry  Neurological:      Mental Status: She is alert and oriented to person, place, and time  Psychiatric:         Behavior: Behavior normal          Thought Content:  Thought content normal          Judgment: Judgment normal

## 2022-10-27 DIAGNOSIS — M17.12 PATELLOFEMORAL ARTHRITIS OF LEFT KNEE: Primary | ICD-10-CM

## 2022-10-27 RX ORDER — IBUPROFEN 800 MG/1
800 TABLET ORAL EVERY 6 HOURS PRN
Qty: 30 TABLET | Refills: 1 | Status: SHIPPED | OUTPATIENT
Start: 2022-10-27

## 2022-10-28 ENCOUNTER — HOSPITAL ENCOUNTER (OUTPATIENT)
Dept: MRI IMAGING | Facility: HOSPITAL | Age: 47
End: 2022-10-28
Payer: COMMERCIAL

## 2022-10-28 ENCOUNTER — TELEPHONE (OUTPATIENT)
Dept: OBGYN CLINIC | Facility: HOSPITAL | Age: 47
End: 2022-10-28

## 2022-10-28 DIAGNOSIS — M17.12 PATELLOFEMORAL ARTHRITIS OF LEFT KNEE: ICD-10-CM

## 2022-10-28 PROCEDURE — 73721 MRI JNT OF LWR EXTRE W/O DYE: CPT

## 2022-10-28 PROCEDURE — G1004 CDSM NDSC: HCPCS

## 2022-10-28 NOTE — TELEPHONE ENCOUNTER
Caller: patient    Doctor: Dr Irina Garcia    Reason for call: patient requested to be seen by Dr Paula Don since she was told that Dr Mor Sands stopped doing knee sx  Is this ok with both doctors?     Call back#: [phone]    Scheduled 11/10 with Dr Paula Don at patient request but can be changed back if necessary

## 2022-10-28 NOTE — TELEPHONE ENCOUNTER
We can see her and follow up MRI  If MRI shows something arthroscopic - [facility] does those surgeries  [facility] does not do ligament reconstructions or TKA

## 2022-11-10 ENCOUNTER — APPOINTMENT (OUTPATIENT)
Dept: RADIOLOGY | Facility: CLINIC | Age: 47
End: 2022-11-10

## 2022-11-10 ENCOUNTER — OFFICE VISIT (OUTPATIENT)
Dept: OBGYN CLINIC | Facility: CLINIC | Age: 47
End: 2022-11-10

## 2022-11-10 VITALS
SYSTOLIC BLOOD PRESSURE: 150 MMHG | DIASTOLIC BLOOD PRESSURE: 88 MMHG | WEIGHT: 235 LBS | BODY MASS INDEX: 39.15 KG/M2 | HEART RATE: 87 BPM | HEIGHT: 65 IN

## 2022-11-10 DIAGNOSIS — Z01.818 PRE-OP EXAM: ICD-10-CM

## 2022-11-10 DIAGNOSIS — Z01.89 ENCOUNTER FOR OTHER SPECIFIED SPECIAL EXAMINATIONS: ICD-10-CM

## 2022-11-10 DIAGNOSIS — M17.12 PRIMARY OSTEOARTHRITIS OF LEFT KNEE: Primary | ICD-10-CM

## 2022-11-10 DIAGNOSIS — M25.562 CHRONIC PAIN OF LEFT KNEE: ICD-10-CM

## 2022-11-10 DIAGNOSIS — M17.12 PRIMARY OSTEOARTHRITIS OF LEFT KNEE: ICD-10-CM

## 2022-11-10 DIAGNOSIS — G89.29 CHRONIC PAIN OF LEFT KNEE: ICD-10-CM

## 2022-11-10 RX ORDER — ASCORBIC ACID 500 MG
500 TABLET ORAL 2 TIMES DAILY
Qty: 60 TABLET | Refills: 0 | Status: SHIPPED | OUTPATIENT
Start: 2022-11-10 | End: 2022-12-10

## 2022-11-10 RX ORDER — FERROUS SULFATE TAB EC 324 MG (65 MG FE EQUIVALENT) 324 (65 FE) MG
324 TABLET DELAYED RESPONSE ORAL
Qty: 30 TABLET | Refills: 0 | Status: SHIPPED | OUTPATIENT
Start: 2022-11-10 | End: 2022-12-10

## 2022-11-10 RX ORDER — MELATONIN
1000 DAILY
Qty: 30 TABLET | Refills: 0 | Status: SHIPPED | OUTPATIENT
Start: 2022-11-10 | End: 2022-12-10

## 2022-11-10 RX ORDER — ZINC SULFATE 50(220)MG
220 CAPSULE ORAL DAILY
Qty: 30 CAPSULE | Refills: 0 | Status: SHIPPED | OUTPATIENT
Start: 2022-11-10 | End: 2022-12-10

## 2022-11-10 RX ORDER — FOLIC ACID 1 MG/1
1 TABLET ORAL DAILY
Qty: 30 TABLET | Refills: 0 | Status: SHIPPED | OUTPATIENT
Start: 2022-11-10 | End: 2022-12-10

## 2022-11-10 RX ORDER — MELOXICAM 15 MG/1
15 TABLET ORAL DAILY
COMMUNITY

## 2022-11-10 NOTE — PROGRESS NOTES
Assessment/Plan:  1  Primary osteoarthritis of left knee  XR knee 3 vw left non injury    Case request operating room: ARTHROPLASTY KNEE TOTAL W ROBOT - SAME DAY - PRESS FIT - LEFT    Comprehensive metabolic panel    Hemoglobin A1C W/EAG Estimation    CBC and differential    Protime-INR    APTT    MRSA culture    [facility]    Ambulatory referral to Physical Therapy    EKG 12 lead    XR chest pa & lateral    Case request operating room: ARTHROPLASTY KNEE TOTAL W ROBOT - SAME DAY - PRESS FIT - LEFT    Walker    ascorbic acid (VITAMIN C) 500 MG tablet    ferrous sulfate 324 (65 Fe) mg    folic acid (FOLVITE) 1 mg tablet    zinc sulfate (ZINCATE) 220 mg capsule    cholecalciferol (VITAMIN D3) 1,000 units tablet   2  Chronic pain of left knee  Case request operating room: ARTHROPLASTY KNEE TOTAL W ROBOT - SAME DAY - PRESS FIT - LEFT    Comprehensive metabolic panel    Hemoglobin A1C W/EAG Estimation    CBC and differential    Protime-INR    APTT    MRSA culture    Ambulatory referral to [facility]    Ambulatory referral to Physical Therapy    EKG 12 lead    XR chest pa & lateral    Case request operating room: ARTHROPLASTY KNEE TOTAL W ROBOT - SAME DAY - PRESS FIT - LEFT    Walker    ascorbic acid (VITAMIN C) 500 MG tablet    ferrous sulfate 324 (65 Fe) mg    folic acid (FOLVITE) 1 mg tablet    zinc sulfate (ZINCATE) 220 mg capsule    cholecalciferol (VITAMIN D3) 1,000 units tablet   3  Pre-op exam  Ambulatory referral to Family Practice    Ambulatory referral to Physical Therapy     Scribe Attestation    I,:  Arabella Yusuf am acting as a scribe while in the presence of the attending physician :       I,:  Benji Tong DO personally performed the services described in this documentation    as scribed in my presence :         Joseph Cole is a pleasant 49-year-old female who presents today for initial evaluation of her left knee pain    After reviewing her imaging and performing a thorough history and physical exam I explained that she is symptomatic of her end-stage underlying patellofemoral osteoarthritis  She does also demonstrate moderate degenerative change in the medial tibial femoral compartment  Based on the progression of her underlying disease and her persistent pain despite nonoperative management including activity modification, maintenance of appropriate weight, over-the-counter medications, exercise program, and intra-articular corticosteroid and viscosupplementation injection I explained that she is a good candidate for robotic assisted left total knee arthroplasty  The pre lupe and postoperative expectations were discussed here in the office today  The risks and benefits of undergoing robotic assisted left total knee arthroplasty were discussed at length and consents were signed and placed in the chart  Please see risk discussion below  She denies a history of DVT/PE, MRSA infection, diabetes, malignancy, GI bleeding or peptic ulcer  She is not a smoker or excessive drinker, and is COVID vaccinated  She understands she requires preoperative clearance from her primary care physician  She is an excellent candidate for outpatient physical therapy postoperatively and for our same-day discharge pathway  We will utilize Press-Fit implants for her  She will meet with my surgery scheduler today to pick a date for procedure and make preoperative arrangements  All of her questions and concerns were addressed today  We will see her back at time of surgery  Subjective:  Initial evaluation for left knee pain    Patient ID: Aroldo Gallegos is a 50 y o  female who presents today for initial evaluation of her left knee pain  At today's visit, she reports activity related pain in her left knee for at least the last 10 years  She has been receiving nonoperative care for her knee for about that long    Initially, she was treated at [place] [address]  and underwent corticosteroid and viscosupplementation injections  More recently, she was treated by Dr Deb Bird who performed a Synvisc-One injection on 2022  She reports this was only effective for her for about 2 months  At today's visit, she complains of severe aching activity-related pain about her anterior knee  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  She does use ice and meloxicam daily which provides some benefit for her  She is interested in discussing surgery  She denies any recent injury or trauma  Review of Systems   Constitutional: Positive for activity change  Negative for chills, fever and unexpected weight change  HENT: Negative for hearing loss, nosebleeds and sore throat  Eyes: Negative for pain, redness and visual disturbance  Respiratory: Negative for cough, shortness of breath and wheezing  Cardiovascular: Negative for chest pain, palpitations and leg swelling  Gastrointestinal: Negative for abdominal pain, nausea and vomiting  Endocrine: Negative for polydipsia and polyuria  Genitourinary: Negative for dysuria and hematuria  Musculoskeletal: Positive for arthralgias and myalgias  Negative for joint swelling  See HPI   Skin: Negative for rash and wound  Neurological: Negative for dizziness, numbness and headaches  Psychiatric/Behavioral: Negative for decreased concentration and suicidal ideas  The patient is not nervous/anxious  Past Medical History:   Diagnosis Date   • Arthritis    • Asthma    • Contact lens/glasses fitting    • Family history of colon cancer     hep C-liver cancer- to colon cancer-father   • GERD (gastroesophageal reflux disease)    • Obesity (BMI 30 0-34  9)        Past Surgical History:   Procedure Laterality Date   •  SECTION      ,    • DILATION AND CURETTAGE OF UTERUS      miscarriage   • EGD     • ROTATOR CUFF REPAIR Right    • SHOULDER SURGERY     • TONSILLECTOMY • TUBAL LIGATION         Family History   Problem Relation Age of Onset   • Heart disease Mother         pacemaker   • Coronary artery disease Mother    • Hypertension Mother         pulmonary   • Colon cancer Father    • Liver cancer Father    • Hepatitis Father    • Cancer Father          - colon cancer       Social History     Occupational History   • Not on file   Tobacco Use   • Smoking status: Never Smoker   • Smokeless tobacco: Never Used   Vaping Use   • Vaping Use: Never used   Substance and Sexual Activity   • Alcohol use: Yes     Comment: social   • Drug use: Never   • Sexual activity: Yes     Partners: Male     Birth control/protection: OCP         Current Outpatient Medications:   •  ascorbic acid (VITAMIN C) 500 MG tablet, Take 1 tablet (500 mg total) by mouth 2 (two) times a day, Disp: 60 tablet, Rfl: 0  •  cetirizine (ZyrTEC) 10 mg tablet, Take 10 mg by mouth daily, Disp: , Rfl:   •  cholecalciferol (VITAMIN D3) 1,000 units tablet, Take 1 tablet (1,000 Units total) by mouth daily, Disp: 30 tablet, Rfl: 0  •  famotidine (PEPCID) 20 mg tablet, Take 1 tablet (20 mg total) by mouth 2 (two) times a day, Disp: 60 tablet, Rfl: 11  •  ferrous sulfate 324 (65 Fe) mg, Take 1 tablet (324 mg total) by mouth daily before breakfast, Disp: 30 tablet, Rfl: 0  •  fluconazole (DIFLUCAN) 150 mg tablet, , Disp: , Rfl:   •  folic acid (FOLVITE) 1 mg tablet, Take 1 tablet (1 mg total) by mouth daily, Disp: 30 tablet, Rfl: 0  •  ibuprofen (MOTRIN) 800 mg tablet, Take 1 tablet (800 mg total) by mouth every 6 (six) hours as needed for moderate pain, Disp: 30 tablet, Rfl: 1  •  lansoprazole (PREVACID) 30 mg capsule, as needed  , Disp: , Rfl:   •  Levonorgest-Eth Estrad 91-Day 0 15-0 03 &0 01 MG TABS, Take 1 tablet by mouth every morning  , Disp: , Rfl: 2  •  meloxicam (MOBIC) 15 mg tablet, Take 15 mg by mouth daily, Disp: , Rfl:   •  montelukast (SINGULAIR) 10 mg tablet, Take 10 mg by mouth daily, Disp: , Rfl: •  pimecrolimus (ELIDEL) 1 % cream, MIX WITH KETOCONAZOLE CREAM BEFORE APPLYING TO FACE TWICE DAILY, Disp: , Rfl:   •  zinc sulfate (ZINCATE) 220 mg capsule, Take 1 capsule (220 mg total) by mouth daily, Disp: 30 capsule, Rfl: 0  •  fluticasone-vilanterol (Breo Ellipta) 100-25 mcg/inh inhaler, Inhale 1 puff daily Rinse mouth after use , Disp: 60 blister, Rfl: 3    No Active Allergies    Objective:  Vitals:    11/10/22 1451   BP: 150/88   Pulse: 87       Body mass index is 39 11 kg/m²  Left Knee Exam     Tenderness   The patient is experiencing tenderness in the patella  Range of Motion   Extension: 0   Flexion: 120     Tests   Varus: negative Valgus: negative  Drawer:  Anterior - negative     Posterior - negative    Other   Erythema: absent  Scars: absent  Sensation: normal  Pulse: present  Swelling: none  Effusion: no effusion present    Comments:  Stable at 0, 30 and 90 degrees  Neurovascularly in tact distally  No warmth or erythema  Parapatellar crepitance noted  Patellofemoral grind: positive          Observations   Left Knee   Negative for effusion  Physical Exam  Vitals and nursing note reviewed  Constitutional:       Appearance: She is well-developed  HENT:      Head: Normocephalic and atraumatic  Eyes:      General: No scleral icterus  Extraocular Movements: Extraocular movements intact  Conjunctiva/sclera: Conjunctivae normal    Cardiovascular:      Rate and Rhythm: Normal rate  Pulmonary:      Effort: Pulmonary effort is normal  No respiratory distress  Musculoskeletal:      Cervical back: Normal range of motion and neck supple  Left knee: No effusion  Comments: As noted in HPI   Skin:     General: Skin is warm and dry  Neurological:      Mental Status: She is alert and oriented to person, place, and time  Psychiatric:         Behavior: Behavior normal          I have personally reviewed pertinent films in PACS      X-ray of the left knee obtained on 11/10/2022 reviewed demonstrating end-stage patellofemoral degenerative change with bone-on-bone contact  There is moderate tibial femoral degenerative change with narrowing  There is tricompartmental sclerosis and osteophytosis  There is no acute fracture, dislocation, lytic or blastic lesion  MRI of the left knee obtained on 10/28/2022 reviewed confirming degenerative changes in the medial and patellofemoral compartments  The patient was counseled in detail regarding the diagnosis, the treatment options available, the prognosis of each treatment option, the potential risks and complications  These are, but are not limited to; deep vein thrombosis, pulmonary embolism, neurologic and vascular injury, infection, instability, leg length discrepancy, dislocation, hematoma, reflex sympathetic dystrophy, loss of range of motion, ankylosis of the knee, fracture, screw or prosthetic perforation, chronic pain, acute pain, chronic leg pain and edema, loosening, death, heart attack, and stroke  The patient's questions were answered in detail  The patient demonstrates understanding of these risks and wishes to proceed with surgery  This document was created using speech voice recognition software  Grammatical errors, random word insertions, pronoun errors, and incomplete sentences are an occasional consequence of this system due to software limitations, ambient noise, and hardware issues  Any formal questions or concerns about content, text, or information contained within the body of this dictation should be directly addressed to the provider for clarification

## 2022-11-11 ENCOUNTER — TELEPHONE (OUTPATIENT)
Dept: OBGYN CLINIC | Facility: CLINIC | Age: 47
End: 2022-11-11

## 2022-11-11 NOTE — TELEPHONE ENCOUNTER
Pt called requesting a note for work stating date of surgery, type of surgery and how long patient is expected to be out of work  She is a Full Time Teacher  Please call patient when note is complete and she will  at office

## 2022-12-01 LAB — HBA1C MFR BLD HPLC: 5.4 %

## 2022-12-05 ENCOUNTER — RA CDI HCC (OUTPATIENT)
Dept: OTHER | Facility: HOSPITAL | Age: 47
End: 2022-12-05

## 2022-12-05 NOTE — PROGRESS NOTES
[facility]  coding opportunities       Chart reviewed, no opportunity found: CHART REVIEWED, NO OPPORTUNITY FOUND        Patients Insurance        Commercial Insurance: [address] [place] [address]

## 2022-12-07 ENCOUNTER — HOSPITAL ENCOUNTER (OUTPATIENT)
Dept: RADIOLOGY | Facility: HOSPITAL | Age: 47
Discharge: HOME | End: 2022-12-07

## 2022-12-07 ENCOUNTER — OFFICE VISIT (OUTPATIENT)
Dept: LAB | Facility: HOSPITAL | Age: 47
End: 2022-12-07

## 2022-12-07 DIAGNOSIS — M25.562 CHRONIC PAIN OF LEFT KNEE: ICD-10-CM

## 2022-12-07 DIAGNOSIS — G89.29 CHRONIC PAIN OF LEFT KNEE: ICD-10-CM

## 2022-12-07 DIAGNOSIS — M17.12 PRIMARY OSTEOARTHRITIS OF LEFT KNEE: ICD-10-CM

## 2022-12-09 LAB
ATRIAL RATE: 77 BPM
P AXIS: -10 DEGREES
PR INTERVAL: 110 MS
QRS AXIS: -4 DEGREES
QRSD INTERVAL: 98 MS
QT INTERVAL: 406 MS
QTC INTERVAL: 459 MS
T WAVE AXIS: 19 DEGREES
VENTRICULAR RATE: 77 BPM

## 2022-12-12 ENCOUNTER — TELEPHONE (OUTPATIENT)
Dept: OBGYN CLINIC | Facility: HOSPITAL | Age: 47
End: 2022-12-12

## 2022-12-12 DIAGNOSIS — G89.29 CHRONIC PAIN OF LEFT KNEE: ICD-10-CM

## 2022-12-12 DIAGNOSIS — M17.12 PRIMARY OSTEOARTHRITIS OF LEFT KNEE: ICD-10-CM

## 2022-12-12 DIAGNOSIS — M25.562 CHRONIC PAIN OF LEFT KNEE: ICD-10-CM

## 2022-12-12 DIAGNOSIS — M17.12 PRIMARY OSTEOARTHRITIS OF LEFT KNEE: Primary | ICD-10-CM

## 2022-12-12 RX ORDER — LORATADINE 10 MG
TABLET ORAL
Qty: 60 TABLET | Refills: 0 | Status: SHIPPED | OUTPATIENT
Start: 2022-12-12

## 2022-12-12 RX ORDER — MULTIVIT-MIN/IRON/FOLIC ACID/K 18-600-40
CAPSULE ORAL
Qty: 30 TABLET | Refills: 0 | Status: SHIPPED | OUTPATIENT
Start: 2022-12-12

## 2022-12-12 RX ORDER — FOLIC ACID 1 MG/1
TABLET ORAL
Qty: 30 TABLET | Refills: 0 | Status: SHIPPED | OUTPATIENT
Start: 2022-12-12

## 2022-12-12 RX ORDER — FERROUS SULFATE TAB EC 324 MG (65 MG FE EQUIVALENT) 324 (65 FE) MG
324 TABLET DELAYED RESPONSE ORAL
Qty: 30 TABLET | Refills: 0 | Status: SHIPPED | OUTPATIENT
Start: 2022-12-12 | End: 2023-01-11

## 2022-12-13 ENCOUNTER — OFFICE VISIT (OUTPATIENT)
Dept: FAMILY MEDICINE CLINIC | Facility: MEDICAL CENTER | Age: 47
End: 2022-12-13

## 2022-12-13 VITALS
SYSTOLIC BLOOD PRESSURE: 158 MMHG | BODY MASS INDEX: 38.99 KG/M2 | OXYGEN SATURATION: 97 % | WEIGHT: 234 LBS | HEIGHT: 65 IN | DIASTOLIC BLOOD PRESSURE: 92 MMHG | HEART RATE: 78 BPM

## 2022-12-13 DIAGNOSIS — R03.0 ELEVATED BLOOD PRESSURE READING WITHOUT DIAGNOSIS OF HYPERTENSION: ICD-10-CM

## 2022-12-13 DIAGNOSIS — Z01.818 PRE-OP EXAMINATION: Primary | ICD-10-CM

## 2022-12-13 NOTE — PROGRESS NOTES
PRE-OPERATIVE EVALUATION  [facility]    NAME: Alyce Oliva  AGE: 50 y o  SEX: female  : 1975     DATE: 2022     Pre-Operative Evaluation      Chief Complaint: Pre-operative Evaluation     Surgery: Left Total Knee Replacement  Anticipated Date of Surgery: 2022   Referring Provider: No ref  provider found       History of Present Illness:     Eyad Sylvester is a 50 y o  female who presents to the office today for a preoperative consultation at the request of surgeon, Dr Arielle Sena, who plans on performing Left total knee replacement on 2022  Planned anesthesia is general  Patient has a bleeding risk of: no recent abnormal bleeding, no remote history of abnormal bleeding and no use of Ca-channel blockers  Patient does not have objections to receiving blood products if needed  Current anti-platelet/anti-coagulation medications that the patient is prescribed includes: none  Assessment of Chronic Conditions:   - Asthma: Well controlled with use of Breo and albuterol inhaler      Assessment of Cardiac Risk:  Denies unstable or severe angina or MI in the last 6 weeks or history of stent placement in the last year   Denies decompensated heart failure (e g  New onset heart failure, NYHA functional class IV heart failure, or worsening existing heart failure)  Denies significant arrhythmias such as high grade AV block, symptomatic ventricular arrhythmia, newly recognized ventricular tachycardia, supraventricular tachycardia with resting heart rate >100, or symptomatic bradycardia  Denies severe heart valve disease including aortic stenosis or symptomatic mitral stenosis     Exercise Capacity:  Able to walk 4 blocks without symptoms?: Yes  Able to walk 2 flights without symptoms?: Yes    Prior Anesthesia Reactions: No     Personal history of venous thromboembolic disease? No    History of steroid use for >2 weeks within last year?  No         Review of Systems:   Review of Systems Constitutional: Negative for activity change, appetite change, chills, diaphoresis, fatigue, fever and unexpected weight change  HENT: Negative for congestion, dental problem, drooling, ear discharge, ear pain, facial swelling, hearing loss, mouth sores, nosebleeds, postnasal drip, rhinorrhea, sinus pain and sinus pressure  Eyes: Negative for photophobia, pain, discharge, redness, itching and visual disturbance  Respiratory: Negative for apnea, cough, choking, chest tightness, shortness of breath, wheezing and stridor  Cardiovascular: Negative for chest pain, palpitations and leg swelling  Gastrointestinal: Negative for abdominal distention, abdominal pain, anal bleeding, blood in stool, constipation, diarrhea, nausea, rectal pain and vomiting  Endocrine: Negative for cold intolerance, heat intolerance, polydipsia, polyphagia and polyuria  Genitourinary: Negative for decreased urine volume, difficulty urinating, dyspareunia, dysuria, flank pain, hematuria and pelvic pain  Musculoskeletal: Negative for arthralgias, back pain, gait problem, joint swelling, myalgias, neck pain and neck stiffness  Skin: Negative for color change, pallor, rash and wound  Allergic/Immunologic: Negative for environmental allergies, food allergies and immunocompromised state  Neurological: Negative for dizziness, tremors, seizures, syncope, facial asymmetry, speech difficulty, weakness, light-headedness, numbness and headaches  Hematological: Negative for adenopathy  Does not bruise/bleed easily  Psychiatric/Behavioral: Negative for agitation, behavioral problems, confusion, decreased concentration, dysphoric mood, hallucinations, sleep disturbance and suicidal ideas  The patient is not nervous/anxious            Current Problem List:     Patient Active Problem List   Diagnosis   • Asthma   • Family history of colon cancer in father   • Patellofemoral arthritis of left knee   • Elevated serum creatinine   • Family history of colon cancer   • Gastroesophageal reflux disease without esophagitis       Allergies:     No Known Allergies    Medications:       Current Outpatient Medications:   •  cetirizine (ZyrTEC) 10 mg tablet, Take 10 mg by mouth daily, Disp: , Rfl:   •  [employer] Vitamin C 500 MG tablet, TAKE 1 TABLET BY MOUTH TWICE A DAY, Disp: 60 tablet, Rfl: 0  •  D3-1000 25 MCG (1000 UT) tablet, TAKE 1 TABLET BY MOUTH EVERY DAY, Disp: 30 tablet, Rfl: 0  •  famotidine (PEPCID) 20 mg tablet, Take 1 tablet (20 mg total) by mouth 2 (two) times a day, Disp: 60 tablet, Rfl: 11  •  ferrous sulfate 324 (65 Fe) mg, TAKE 1 TABLET (324 MG TOTAL) BY MOUTH DAILY BEFORE BREAKFAST, Disp: 30 tablet, Rfl: 0  •  fluconazole (DIFLUCAN) 150 mg tablet, , Disp: , Rfl:   •  folic acid (FOLVITE) 1 mg tablet, TAKE 1 TABLET BY MOUTH EVERY DAY, Disp: 30 tablet, Rfl: 0  •  ibuprofen (MOTRIN) 800 mg tablet, Take 1 tablet (800 mg total) by mouth every 6 (six) hours as needed for moderate pain, Disp: 30 tablet, Rfl: 1  •  lansoprazole (PREVACID) 30 mg capsule, as needed  , Disp: , Rfl:   •  Levonorgest-Eth Estrad 91-Day 0 15-0 03 &0 01 MG TABS, Take 1 tablet by mouth every morning  , Disp: , Rfl: 2  •  meloxicam (MOBIC) 15 mg tablet, Take 15 mg by mouth daily, Disp: , Rfl:   •  montelukast (SINGULAIR) 10 mg tablet, Take 10 mg by mouth daily, Disp: , Rfl:   •  pimecrolimus (ELIDEL) 1 % cream, MIX WITH KETOCONAZOLE CREAM BEFORE APPLYING TO FACE TWICE DAILY, Disp: , Rfl:   •  fluticasone-vilanterol (Breo Ellipta) 100-25 mcg/inh inhaler, Inhale 1 puff daily Rinse mouth after use , Disp: 60 blister, Rfl: 3  •  zinc sulfate (ZINCATE) 220 mg capsule, Take 1 capsule (220 mg total) by mouth daily, Disp: 30 capsule, Rfl: 0    Past Medical History:       Past Medical History:   Diagnosis Date   • Arthritis    • Asthma    • Contact lens/glasses fitting    • Family history of colon cancer     hep C-liver cancer- to colon cancer-father   • GERD (gastroesophageal reflux disease)    • Obesity (BMI 30 0-34  9)         Past Surgical History:   Procedure Laterality Date   •  SECTION      ,    • DILATION AND CURETTAGE OF UTERUS      miscarriage   • EGD     • ROTATOR CUFF REPAIR Right    • SHOULDER SURGERY     • TONSILLECTOMY     • TUBAL LIGATION          Family History   Problem Relation Age of Onset   • Heart disease Mother         pacemaker   • Coronary artery disease Mother    • Hypertension Mother         pulmonary   • Hearing loss Mother    • Colon cancer Father            • Liver cancer Father    • Hepatitis Father    • Cancer Father          - colon cancer        Social History     Socioeconomic History   • Marital status:      Spouse name: Not on file   • Number of children: Not on file   • Years of education: Not on file   • Highest education level: Not on file   Occupational History   • Not on file   Tobacco Use   • Smoking status: Never   • Smokeless tobacco: Never   Vaping Use   • Vaping Use: Never used   Substance and Sexual Activity   • Alcohol use: Yes     Alcohol/week: 2 0 standard drinks     Types: 2 Standard drinks or equivalent per week     Comment: social   • Drug use: Never   • Sexual activity: Yes     Partners: Male     Birth control/protection: OCP   Other Topics Concern   • Not on file   Social History Narrative   • Not on file     Social Determinants of Health     Financial Resource Strain: Not on file   Food Insecurity: Not on file   Transportation Needs: Not on file   Physical Activity: Not on file   Stress: Not on file   Social Connections: Not on file   Intimate Partner Violence: Not on file   Housing Stability: Not on file        Physical Exam:     Vitals:    22 1500   BP: 158/92   Pulse:    SpO2:        Physical Exam   Constitutional: She is oriented to person, place, and time  She appears well-developed and well-nourished  No distress  HENT:   Head: Normocephalic and atraumatic     Right Ear: External ear normal    Left Ear: External ear normal    Nose: Nose normal    Mouth/Throat: Oropharynx is clear and moist  No oropharyngeal exudate  Eyes: Conjunctivae and EOM are normal  Pupils are equal, round, and reactive to light  Right eye exhibits no discharge  Left eye exhibits no discharge  No scleral icterus  Neck: Normal range of motion  Neck supple  No JVD present  No thyromegaly present  Cardiovascular: Normal rate, regular rhythm, normal heart sounds and intact distal pulses  Exam reveals no gallop and no friction rub  No murmur heard  Pulmonary/Chest: Effort normal and breath sounds normal  No respiratory distress  She has no wheezes  She has no rales  She exhibits no tenderness  Abdominal: Soft  Bowel sounds are normal  She exhibits no distension and no mass  There is no tenderness  There is no rebound and no guarding  Musculoskeletal: Normal range of motion  She exhibits no edema, tenderness or deformity  Lymphadenopathy:     She has no cervical adenopathy  Neurological: She is alert and oriented to person, place, and time  She has normal reflexes  She displays normal reflexes  No cranial nerve deficit  She exhibits normal muscle tone  Coordination normal    Skin: Skin is warm and dry  No rash noted  She is not diaphoretic  No erythema  Psychiatric: She has a normal mood and affect  Her behavior is normal  Judgment and thought content normal    Nursing note and vitals reviewed  Data:     Pre-operative work-up    Laboratory Results: I have personally reviewed the pertinent laboratory results/reports     EKG: I have personally reviewed pertinent reports  Chest x-ray: I have personally reviewed pertinent reports  Previous cardiopulmonary studies within the past year:  Echocardiogram: n/a   Cardiac Catheterization: n/a  Stress Test: n/a  Pulmonary Function Testing: n/a      Assessment & Recommendations:     1  Pre-op examination        2   Elevated blood pressure reading without diagnosis of hypertension            Pre-Op Evaluation Assessment  50 y o  female with planned surgery: Left total knee replacement  Known risk factors for perioperative complications: None  Cardiac Risk Estimation: per the Revised Cardiac Risk Index (Circ  100:1043, 1999), the patient's risk factors for cardiac complications include none, putting her in: RCI RISK CLASS I (0 risk factors, risk of major cardiac compl  appr  0 5%)  Current medications which may produce withdrawal symptoms if withheld perioperatively: none  Pre-Op Evaluation Plan  1  Further preoperative workup as follows:   - Chest x-ray  - ECG  - Complete blood count  - Basic metabolic profile  - Hepatic function panel  - Coagulation studies    2  Medication Management/Recommendations:   - None, continue medication regimen including morning of surgery, with sip of water Advised to hold Meloxicam and or Ibuprofen 2 weeks prior to surgery  3  Prophylaxis for cardiac events with perioperative beta-blockers: not indicated  4  Patient requires further consultation with: None    Clearance  Patient is CLEARED for surgery without any additional cardiac testing  Advised patient to further discuss elevated BP reading with Orthopedic Surgery prior to surgery as today BP was elevated in office 158/92  No prior history or diagnosis of HTN  Patient reports normal BP reading at last office visit with LEONEL Marquez  [address] [address]  Phone#  [phone]  Fax#  [phone]

## 2022-12-22 DIAGNOSIS — M17.12 PRIMARY OSTEOARTHRITIS OF LEFT KNEE: ICD-10-CM

## 2022-12-22 DIAGNOSIS — Z01.818 PRE-OP EXAM: Primary | ICD-10-CM

## 2022-12-22 DIAGNOSIS — G89.29 CHRONIC PAIN OF LEFT KNEE: ICD-10-CM

## 2022-12-22 DIAGNOSIS — M25.562 CHRONIC PAIN OF LEFT KNEE: ICD-10-CM

## 2022-12-22 RX ORDER — ZINC SULFATE 50(220)MG
CAPSULE ORAL
Qty: 30 CAPSULE | Refills: 0 | Status: SHIPPED | OUTPATIENT
Start: 2022-12-22

## 2023-01-03 ENCOUNTER — EVALUATION (OUTPATIENT)
Dept: PHYSICAL THERAPY | Facility: CLINIC | Age: 48
End: 2023-01-03

## 2023-01-03 DIAGNOSIS — G89.29 CHRONIC PAIN OF LEFT KNEE: ICD-10-CM

## 2023-01-03 DIAGNOSIS — M25.562 CHRONIC PAIN OF LEFT KNEE: ICD-10-CM

## 2023-01-03 DIAGNOSIS — M17.12 PRIMARY OSTEOARTHRITIS OF LEFT KNEE: Primary | ICD-10-CM

## 2023-01-03 NOTE — PROGRESS NOTES
PT Evaluation     Today's date: 1/3/2023  Patient name: Camden Garcia  : 1975  MRN: [id number]  Referring provider: José Merida DO  Dx:   Encounter Diagnosis     ICD-10-CM    1  Primary osteoarthritis of left knee  M17 12       2  Chronic pain of left knee  M25 562     G89 29           Start Time: 1530  Stop Time: 1615  Total time in clinic (min): 45 minutes    Assessment  Assessment details: Patient is arriving to clinic today for pre-operative examination of left knee for TKA scheduled for 2023  At this time patient exhibits pain limiting strength, range of motion, and function of left knee  Patient has been educated on home prep prior to surgery, technique for negotiating stairs, transfers to and from vehicle after surgery, and signs and symptoms consistent with blood clot and infection  Patient will be re-evaluated post-op  Impairments: abnormal gait, abnormal or restricted ROM, activity intolerance, impaired physical strength, lacks appropriate home exercise program, pain with function, weight-bearing intolerance and poor posture     Symptom irritability: high  Goals  Short term goals: 4 weeks post-op  1  Improve walking tolerance to 15 minutes to perform household activity  2  Patient to be able to negotiate 4 stairs with one handrail and good safety to get into home safely  3  Patient to improve knee extension by 20 degrees to improve quality of gait and reduce risk for falls  4  Patient to reduce pain to 4/10 at its worst to improve activity tolerance    Long term goals: 12 weeks  1  Improve walking tolerance to 45 minutes with least restrictive AD and good safety to return to community level ambulation  2  Patient to improve knee flexion to 130 degrees to improve stair negotiation  3  Patient to improve TUG score to 12 seconds or less to reduce risk for falls  4   Patient to reduce pain to 2/10 at its worst to improve QOL and return to function      Plan  Patient would benefit from: skilled physical therapy  Planned modality interventions: TENS, unattended electrical stimulation, thermotherapy: hydrocollator packs and cryotherapy  Planned therapy interventions: abdominal trunk stabilization, flexibility, functional ROM exercises, home exercise program, therapeutic exercise, therapeutic activities, stretching, strengthening, self care, postural training, patient education, neuromuscular re-education, massage, manual therapy and joint mobilization  Frequency: 3x week  Duration in weeks: 14  Treatment plan discussed with: patient        Subjective Evaluation    History of Present Illness  Mechanism of injury: Patient reports chronic history of left knee pain, noting 10 years of pain  Patient notes she has had injections of both Synvisc and cortisone  Patient states most recent flair up was on  when she was walking and felt a tweak in her knee  Patient notes that she had an MRI done and an total knee replacement was decided upon for 2023            Recurrent probem    Pain  Current pain ratin  At best pain ratin  At worst pain ratin  Quality: discomfort, dull ache and burning  Relieving factors: ice, rest and relaxation  Aggravating factors: walking, standing and stair climbing  Progression: worsening    Social Support  Steps to enter house: yes  2  Stairs in house: yes   13  Lives in: multiple-level home  Lives with: adult children    Employment status: working    Diagnostic Tests  X-ray: abnormal  MRI studies: abnormal  Treatments  Previous treatment: physical therapy and injection treatment  Patient Goals  Patient goals for therapy: increased strength, return to work, increased motion, decreased pain, decreased edema and return to sport/leisure activities          Objective  AROM:    R  L        Knee flex sup   130  115  Knee ext Qset   -2  -2    MMT:    R  L       Knee flex  5/5  4+/5  Knee exten  5/5  4+/5  Hip flexion  5/5  5/5  Hip ER   5/5  5/5  Hip IR     Hip exten    Ankle DF    Ankle PF        Balance:     Tandem R post w/ EO: 30 sec+  Tandem L post  w/ EO: 30 sec+  SLS R w/ EO:  30 sec+  SLS L w/ EO:  30 sec+  FT w/ EO:  30 sec+  FT w/ EC:   30 sec+      Function: Limited in squatting, kneeling, walking, stairs  stairs are reciprocal no  standing tolerance estimated at 78 minutes  sleep limitation due to pain Yes  Squatting Unable    Gait: Slight antalgic pattern    TU seconds  Patellar mobility: Fair (+) compression     Edema/circumference:    Tibial tuberosity R:  44 cm  Tibiail tuberosisty L:  42 cm  Superior patellar border R: 49 5 cm  Superior patellar border L:  49 cm    Homen's sign: (-)    Flexibility: Impaired flexibility of left gastroc/soleus, quadriceps           Precautions:   Past Medical History:   Diagnosis Date   • Arthritis    • Asthma    • Contact lens/glasses fitting    • Family history of colon cancer     hep C-liver cancer- to colon cancer-father   • GERD (gastroesophageal reflux disease)    • Obesity (BMI 30 0-34  9)            DOS: 2023  Daily Treatment Log:  Date 2023       Visit # 1 pre-op       Manual                        There Exer         qset reviewed       Heel slides reviewed       LAQ reviewed               B/L HR/TR reviewed               Pt educ post-op precautions, WB status, edema mgt, DVT & infection prevention reviewed       HEP Per post-op joint booklet       There Activ        Stair training w/ rail & folded RW reviewed       Car transfers reviewed       Curb training reviewed       Review virtual home assessment reviewed                       NMReed                                                Modalities

## 2023-01-03 NOTE — PRE-PROCEDURE INSTRUCTIONS
My Surgical Experience    The following information was developed to assist you to prepare for your operation  What do I need to do before coming to the hospital?  • Arrange for a responsible person to drive you to and from the hospital   • Arrange care for your children at home  Children are not allowed in the recovery areas of the hospital  • Plan to wear clothing that is easy to put on and take off  If you are having shoulder surgery, wear a shirt that buttons or zippers in the front  Bathing  o Shower the evening before and the morning of your surgery with an antibacterial soap  Please refer to the Pre Op Showering Instructions for Surgery Patients Sheet   o Remove nail polish and all body piercing jewelry  o Do not shave any body part for at least 24 hours before surgery-this includes face, arms, legs and upper body  Food  o Nothing to eat or drink after midnight the night before your surgery  This includes candy and chewing gum  o Exception: If your surgery is after 12:00pm (noon), you may have clear liquids such as 7-Up®, ginger ale, apple or cranberry juice, Jell-O®, water, or clear broth until 8:00 am  o Do not drink milk or juice with pulp on the morning before surgery  o Do not drink alcohol 24 hours before surgery  Medicine  o Follow instructions you received from your surgeon about which medicines you may take on the day of surgery  o If instructed to take medicine on the morning of surgery, take pills with just a small sip of water  Call your prescribing doctor for specific infroamtion on what to do if you take insulin    What should I bring to the hospital?    Bring:  • Crutches or a walker, if you have them, for foot or knee surgery  • A list of the daily medicines, vitamins, minerals, herbals and nutritional supplements you take   Include the dosages of medicines and the time you take them each day  • Glasses, dentures or hearing aids  • Minimal clothing; you will be wearing hospital sleepwear  • Photo ID; required to verify your identity  • If you have a Living Will or Power of , bring a copy of the documents  • If you have an ostomy, bring an extra pouch and any supplies you use    Do not bring  • Medicines or inhalers  • Money, valuables or jewelry    What other information should I know about the day of surgery? • Notify your surgeons if you develop a cold, sore throat, cough, fever, rash or any other illness  • Report to the Ambulatory Surgical/Same Day Surgery Unit  • You will be instructed to stop at Registration only if you have not been pre-registered  • Inform your  fi they do not stay that they will be asked by the staff to leave a phone number where they can be reached  • Be available to be reached before surgery  In the event the operating room schedule changes, you may be asked to come in earlier or later than expected    *It is important to tell your doctor and others involved in your health care if you are taking or have been taking any non-prescription drugs, vitamins, minerals, herbals or other nutritional supplements  Any of these may interact with some food or medicines and cause a reaction      Pre-Surgery Instructions:   Medication Instructions   • cetirizine (ZyrTEC) 10 mg tablet Take day of surgery  • [employer] Vitamin C 500 MG tablet Hold day of surgery  • D3-1000 25 MCG (1000 UT) tablet Hold day of surgery  • famotidine (PEPCID) 20 mg tablet Take day of surgery  • ferrous sulfate 324 (65 Fe) mg Hold day of surgery  • fluticasone-vilanterol (Breo Ellipta) 100-25 mcg/inh inhaler Take day of surgery  • folic acid (FOLVITE) 1 mg tablet Hold day of surgery  • ibuprofen (MOTRIN) 800 mg tablet Stop taking 7 days prior to surgery  • lansoprazole (PREVACID) 30 mg capsule Hold day of surgery  • Levonorgest-Eth Estrad 91-Day 0 15-0 03 &0 01 MG TABS Hold day of surgery  • meloxicam (MOBIC) 15 mg tablet Stop taking 7 days prior to surgery     • zinc sulfate (ZINCATE) 220 mg capsule Hold day of surgery

## 2023-01-06 ENCOUNTER — ANESTHESIA EVENT (OUTPATIENT)
Dept: PERIOP | Facility: HOSPITAL | Age: 48
End: 2023-01-06

## 2023-01-09 ENCOUNTER — HOSPITAL ENCOUNTER (OUTPATIENT)
Facility: HOSPITAL | Age: 48
Setting detail: OUTPATIENT SURGERY
Discharge: HOME | End: 2023-01-09
Attending: ORTHOPAEDIC SURGERY | Admitting: ORTHOPAEDIC SURGERY

## 2023-01-09 ENCOUNTER — ANESTHESIA (OUTPATIENT)
Dept: PERIOP | Facility: HOSPITAL | Age: 48
End: 2023-01-09

## 2023-01-09 ENCOUNTER — APPOINTMENT (OUTPATIENT)
Dept: RADIOLOGY | Facility: HOSPITAL | Age: 48
End: 2023-01-09

## 2023-01-09 VITALS
OXYGEN SATURATION: 96 % | DIASTOLIC BLOOD PRESSURE: 65 MMHG | RESPIRATION RATE: 18 BRPM | HEART RATE: 70 BPM | TEMPERATURE: 97.6 F | WEIGHT: 234 LBS | BODY MASS INDEX: 38.94 KG/M2 | SYSTOLIC BLOOD PRESSURE: 135 MMHG

## 2023-01-09 DIAGNOSIS — Z96.652 STATUS POST TOTAL KNEE REPLACEMENT NOT USING CEMENT, LEFT: Primary | ICD-10-CM

## 2023-01-09 LAB
DME PARACHUTE DELIVERY DATE ACTUAL: NORMAL
DME PARACHUTE DELIVERY DATE REQUESTED: NORMAL
DME PARACHUTE ITEM DESCRIPTION: NORMAL
DME PARACHUTE ORDER STATUS: NORMAL
DME PARACHUTE SUPPLIER NAME: NORMAL
DME PARACHUTE SUPPLIER PHONE: NORMAL
GLUCOSE SERPL-MCNC: 102 MG/DL (ref 65–140)

## 2023-01-09 DEVICE — ATTUNE KNEE SYSTEM TIBIAL BASE AFFIXIUM FIXED BEARING SIZE 4
Type: IMPLANTABLE DEVICE | Site: KNEE | Status: FUNCTIONAL
Brand: ATTUNE AFFIXIUM

## 2023-01-09 DEVICE — ATTUNE KNEE SYSTEM FEMORAL POROCOAT CRUCIATE RETAINING NARROW SIZE 6N LEFT CEMENTLESS
Type: IMPLANTABLE DEVICE | Site: KNEE | Status: FUNCTIONAL
Brand: ATTUNE

## 2023-01-09 DEVICE — PALACOS® R IS A FAST-CURING, RADIOPAQUE, POLY(METHYL METHACRYLATE)-BASED BONE CEMENT.PALACOS ® R CONTAINS THE X-RAY CONTRAST MEDIUM ZIRCONIUM DIOXIDE. TO IMPROVE VISIBILITY IN THE SURGICAL FIELD PALACOS ® R HAS BEEN COLOURED WITH CHLOROPHYLL (E141). THE BONE CEMENT IS PREPARED DIRECTLY BEFORE USE BY MIXING A POLYMER POWDER COMPONENT WITH A LIQUID MONOMER COMPONENT. A DUCTILE DOUGH FORMS WHICH CURES WITHIN A FEW MINUTES.
Type: IMPLANTABLE DEVICE | Site: PATELLA | Status: FUNCTIONAL
Brand: PALACOS®

## 2023-01-09 DEVICE — ATTUNE PATELLA MEDIALIZED DOME 32MM CEMENTED AOX
Type: IMPLANTABLE DEVICE | Site: KNEE | Status: FUNCTIONAL
Brand: ATTUNE

## 2023-01-09 DEVICE — ATTUNE KNEE SYSTEM TIBIAL INSERT FIXED BEARING MEDIAL STABILIZED LEFT AOX 6, 5MM
Type: IMPLANTABLE DEVICE | Site: KNEE | Status: FUNCTIONAL
Brand: ATTUNE

## 2023-01-09 RX ORDER — FENTANYL CITRATE/PF 50 MCG/ML
50 SYRINGE (ML) INJECTION
Status: DISCONTINUED | OUTPATIENT
Start: 2023-01-09 | End: 2023-01-09 | Stop reason: HOSPADM

## 2023-01-09 RX ORDER — HYDROMORPHONE HCL/PF 1 MG/ML
0.2 SYRINGE (ML) INJECTION
Status: DISCONTINUED | OUTPATIENT
Start: 2023-01-09 | End: 2023-01-09 | Stop reason: HOSPADM

## 2023-01-09 RX ORDER — MONTELUKAST SODIUM 10 MG/1
10 TABLET ORAL DAILY
Status: DISCONTINUED | OUTPATIENT
Start: 2023-01-10 | End: 2023-01-09 | Stop reason: HOSPADM

## 2023-01-09 RX ORDER — PROPOFOL 10 MG/ML
INJECTION, EMULSION INTRAVENOUS AS NEEDED
Status: DISCONTINUED | OUTPATIENT
Start: 2023-01-09 | End: 2023-01-09

## 2023-01-09 RX ORDER — MAGNESIUM HYDROXIDE 1200 MG/15ML
LIQUID ORAL AS NEEDED
Status: DISCONTINUED | OUTPATIENT
Start: 2023-01-09 | End: 2023-01-09 | Stop reason: HOSPADM

## 2023-01-09 RX ORDER — PANTOPRAZOLE SODIUM 40 MG/1
40 TABLET, DELAYED RELEASE ORAL DAILY
Status: DISCONTINUED | OUTPATIENT
Start: 2023-01-10 | End: 2023-01-09 | Stop reason: HOSPADM

## 2023-01-09 RX ORDER — FLUTICASONE FUROATE AND VILANTEROL 100; 25 UG/1; UG/1
1 POWDER RESPIRATORY (INHALATION) DAILY
Status: DISCONTINUED | OUTPATIENT
Start: 2023-01-10 | End: 2023-01-09 | Stop reason: HOSPADM

## 2023-01-09 RX ORDER — BUPIVACAINE HYDROCHLORIDE 2.5 MG/ML
INJECTION, SOLUTION EPIDURAL; INFILTRATION; INTRACAUDAL
Status: DISCONTINUED | OUTPATIENT
Start: 2023-01-09 | End: 2023-01-09

## 2023-01-09 RX ORDER — ONDANSETRON 2 MG/ML
4 INJECTION INTRAMUSCULAR; INTRAVENOUS EVERY 6 HOURS PRN
Status: DISCONTINUED | OUTPATIENT
Start: 2023-01-09 | End: 2023-01-09 | Stop reason: HOSPADM

## 2023-01-09 RX ORDER — OXYCODONE HCL 10 MG/1
10 TABLET, FILM COATED, EXTENDED RELEASE ORAL ONCE
Status: COMPLETED | OUTPATIENT
Start: 2023-01-09 | End: 2023-01-09

## 2023-01-09 RX ORDER — SODIUM CHLORIDE, SODIUM LACTATE, POTASSIUM CHLORIDE, CALCIUM CHLORIDE 600; 310; 30; 20 MG/100ML; MG/100ML; MG/100ML; MG/100ML
100 INJECTION, SOLUTION INTRAVENOUS CONTINUOUS
Status: DISCONTINUED | OUTPATIENT
Start: 2023-01-09 | End: 2023-01-09 | Stop reason: HOSPADM

## 2023-01-09 RX ORDER — ONDANSETRON 2 MG/ML
INJECTION INTRAMUSCULAR; INTRAVENOUS AS NEEDED
Status: DISCONTINUED | OUTPATIENT
Start: 2023-01-09 | End: 2023-01-09

## 2023-01-09 RX ORDER — DEXAMETHASONE SODIUM PHOSPHATE 4 MG/ML
INJECTION, SOLUTION INTRA-ARTICULAR; INTRALESIONAL; INTRAMUSCULAR; INTRAVENOUS; SOFT TISSUE AS NEEDED
Status: DISCONTINUED | OUTPATIENT
Start: 2023-01-09 | End: 2023-01-09

## 2023-01-09 RX ORDER — FENTANYL CITRATE 50 UG/ML
INJECTION, SOLUTION INTRAMUSCULAR; INTRAVENOUS AS NEEDED
Status: DISCONTINUED | OUTPATIENT
Start: 2023-01-09 | End: 2023-01-09

## 2023-01-09 RX ORDER — HYDROMORPHONE HCL/PF 1 MG/ML
0.5 SYRINGE (ML) INJECTION EVERY 2 HOUR PRN
Status: DISCONTINUED | OUTPATIENT
Start: 2023-01-09 | End: 2023-01-09 | Stop reason: HOSPADM

## 2023-01-09 RX ORDER — ASPIRIN 325 MG
325 TABLET ORAL 2 TIMES DAILY
Qty: 84 TABLET | Refills: 0 | Status: SHIPPED | OUTPATIENT
Start: 2023-01-09 | End: 2023-02-20

## 2023-01-09 RX ORDER — MAGNESIUM HYDROXIDE/ALUMINUM HYDROXICE/SIMETHICONE 120; 1200; 1200 MG/30ML; MG/30ML; MG/30ML
30 SUSPENSION ORAL EVERY 6 HOURS PRN
Status: DISCONTINUED | OUTPATIENT
Start: 2023-01-09 | End: 2023-01-09 | Stop reason: HOSPADM

## 2023-01-09 RX ORDER — CELECOXIB 100 MG/1
100 CAPSULE ORAL 2 TIMES DAILY
Status: DISCONTINUED | OUTPATIENT
Start: 2023-01-09 | End: 2023-01-09 | Stop reason: HOSPADM

## 2023-01-09 RX ORDER — CEFAZOLIN SODIUM 2 G/50ML
SOLUTION INTRAVENOUS AS NEEDED
Status: DISCONTINUED | OUTPATIENT
Start: 2023-01-09 | End: 2023-01-09

## 2023-01-09 RX ORDER — CEFAZOLIN SODIUM 2 G/50ML
2000 SOLUTION INTRAVENOUS EVERY 6 HOURS
Status: COMPLETED | OUTPATIENT
Start: 2023-01-09 | End: 2023-01-09

## 2023-01-09 RX ORDER — SODIUM CHLORIDE 9 MG/ML
INJECTION, SOLUTION INTRAVENOUS AS NEEDED
Status: DISCONTINUED | OUTPATIENT
Start: 2023-01-09 | End: 2023-01-09 | Stop reason: HOSPADM

## 2023-01-09 RX ORDER — ONDANSETRON 2 MG/ML
4 INJECTION INTRAMUSCULAR; INTRAVENOUS ONCE AS NEEDED
Status: DISCONTINUED | OUTPATIENT
Start: 2023-01-09 | End: 2023-01-09 | Stop reason: HOSPADM

## 2023-01-09 RX ORDER — DOCUSATE SODIUM 100 MG/1
100 CAPSULE, LIQUID FILLED ORAL 2 TIMES DAILY
Qty: 60 CAPSULE | Refills: 0 | Status: SHIPPED | OUTPATIENT
Start: 2023-01-09

## 2023-01-09 RX ORDER — CELECOXIB 100 MG/1
100 CAPSULE ORAL 2 TIMES DAILY
Qty: 28 CAPSULE | Refills: 0 | Status: SHIPPED | OUTPATIENT
Start: 2023-01-09 | End: 2023-01-25

## 2023-01-09 RX ORDER — ACETAMINOPHEN 325 MG/1
975 TABLET ORAL ONCE
Status: COMPLETED | OUTPATIENT
Start: 2023-01-09 | End: 2023-01-09

## 2023-01-09 RX ORDER — DOCUSATE SODIUM 100 MG/1
100 CAPSULE, LIQUID FILLED ORAL 2 TIMES DAILY
Status: DISCONTINUED | OUTPATIENT
Start: 2023-01-09 | End: 2023-01-09 | Stop reason: HOSPADM

## 2023-01-09 RX ORDER — CHLORHEXIDINE GLUCONATE 0.12 MG/ML
15 RINSE ORAL ONCE
Status: COMPLETED | OUTPATIENT
Start: 2023-01-09 | End: 2023-01-09

## 2023-01-09 RX ORDER — LEVONORGESTREL / ETHINYL ESTRADIOL AND ETHINYL ESTRADIOL 150-30(84)
1 KIT ORAL EVERY MORNING
Status: DISCONTINUED | OUTPATIENT
Start: 2023-01-10 | End: 2023-01-09 | Stop reason: HOSPADM

## 2023-01-09 RX ORDER — OXYCODONE HYDROCHLORIDE 5 MG/1
5 TABLET ORAL EVERY 4 HOURS PRN
Status: DISCONTINUED | OUTPATIENT
Start: 2023-01-09 | End: 2023-01-09 | Stop reason: HOSPADM

## 2023-01-09 RX ORDER — PROPOFOL 10 MG/ML
INJECTION, EMULSION INTRAVENOUS CONTINUOUS PRN
Status: DISCONTINUED | OUTPATIENT
Start: 2023-01-09 | End: 2023-01-09

## 2023-01-09 RX ORDER — OXYCODONE HYDROCHLORIDE 5 MG/1
TABLET ORAL
Qty: 60 TABLET | Refills: 0 | Status: SHIPPED | OUTPATIENT
Start: 2023-01-09 | End: 2023-01-25 | Stop reason: SDUPTHER

## 2023-01-09 RX ORDER — MIDAZOLAM HYDROCHLORIDE 2 MG/2ML
INJECTION, SOLUTION INTRAMUSCULAR; INTRAVENOUS AS NEEDED
Status: DISCONTINUED | OUTPATIENT
Start: 2023-01-09 | End: 2023-01-09

## 2023-01-09 RX ORDER — BUPIVACAINE HYDROCHLORIDE 7.5 MG/ML
INJECTION, SOLUTION INTRASPINAL AS NEEDED
Status: DISCONTINUED | OUTPATIENT
Start: 2023-01-09 | End: 2023-01-09

## 2023-01-09 RX ORDER — ACETAMINOPHEN 325 MG/1
975 TABLET ORAL EVERY 8 HOURS
Refills: 0
Start: 2023-01-09

## 2023-01-09 RX ORDER — OXYCODONE HYDROCHLORIDE 5 MG/1
10 TABLET ORAL EVERY 4 HOURS PRN
Status: DISCONTINUED | OUTPATIENT
Start: 2023-01-09 | End: 2023-01-09 | Stop reason: HOSPADM

## 2023-01-09 RX ORDER — SODIUM CHLORIDE, SODIUM LACTATE, POTASSIUM CHLORIDE, CALCIUM CHLORIDE 600; 310; 30; 20 MG/100ML; MG/100ML; MG/100ML; MG/100ML
125 INJECTION, SOLUTION INTRAVENOUS CONTINUOUS
Status: DISCONTINUED | OUTPATIENT
Start: 2023-01-09 | End: 2023-01-09 | Stop reason: HOSPADM

## 2023-01-09 RX ORDER — GABAPENTIN 300 MG/1
300 CAPSULE ORAL ONCE
Status: COMPLETED | OUTPATIENT
Start: 2023-01-09 | End: 2023-01-09

## 2023-01-09 RX ORDER — ACETAMINOPHEN 325 MG/1
975 TABLET ORAL EVERY 8 HOURS
Status: DISCONTINUED | OUTPATIENT
Start: 2023-01-09 | End: 2023-01-09 | Stop reason: HOSPADM

## 2023-01-09 RX ORDER — CEFAZOLIN SODIUM 2 G/50ML
2000 SOLUTION INTRAVENOUS ONCE
Status: DISCONTINUED | OUTPATIENT
Start: 2023-01-09 | End: 2023-01-09

## 2023-01-09 RX ORDER — GABAPENTIN 100 MG/1
200 CAPSULE ORAL 2 TIMES DAILY
Status: DISCONTINUED | OUTPATIENT
Start: 2023-01-09 | End: 2023-01-09 | Stop reason: HOSPADM

## 2023-01-09 RX ORDER — BUPIVACAINE HYDROCHLORIDE 2.5 MG/ML
INJECTION, SOLUTION EPIDURAL; INFILTRATION; INTRACAUDAL AS NEEDED
Status: DISCONTINUED | OUTPATIENT
Start: 2023-01-09 | End: 2023-01-09 | Stop reason: HOSPADM

## 2023-01-09 RX ORDER — ASPIRIN 325 MG
325 TABLET ORAL 2 TIMES DAILY
Status: DISCONTINUED | OUTPATIENT
Start: 2023-01-09 | End: 2023-01-09 | Stop reason: HOSPADM

## 2023-01-09 RX ADMIN — SODIUM CHLORIDE, SODIUM LACTATE, POTASSIUM CHLORIDE, AND CALCIUM CHLORIDE 125 ML/HR: .6; .31; .03; .02 INJECTION, SOLUTION INTRAVENOUS at 07:41

## 2023-01-09 RX ADMIN — ACETAMINOPHEN 975 MG: 325 TABLET, FILM COATED ORAL at 07:38

## 2023-01-09 RX ADMIN — HYDROMORPHONE HYDROCHLORIDE 0.5 MG: 1 INJECTION, SOLUTION INTRAMUSCULAR; INTRAVENOUS; SUBCUTANEOUS at 14:54

## 2023-01-09 RX ADMIN — GABAPENTIN 300 MG: 300 CAPSULE ORAL at 07:39

## 2023-01-09 RX ADMIN — CHLORHEXIDINE GLUCONATE 15 ML: 1.2 SOLUTION ORAL at 07:39

## 2023-01-09 RX ADMIN — OXYCODONE HYDROCHLORIDE 10 MG: 10 TABLET, FILM COATED, EXTENDED RELEASE ORAL at 07:39

## 2023-01-09 RX ADMIN — BUPIVACAINE 10 ML: 13.3 INJECTION, SUSPENSION, LIPOSOMAL INFILTRATION at 12:20

## 2023-01-09 RX ADMIN — TRANEXAMIC ACID 100 ML: 1 INJECTION, SOLUTION INTRAVENOUS at 09:55

## 2023-01-09 RX ADMIN — BUPIVACAINE HYDROCHLORIDE 15 ML: 2.5 INJECTION, SOLUTION EPIDURAL; INFILTRATION; INTRACAUDAL at 12:20

## 2023-01-09 RX ADMIN — BUPIVACAINE HYDROCHLORIDE IN DEXTROSE 1.6 ML: 7.5 INJECTION, SOLUTION SUBARACHNOID at 09:49

## 2023-01-09 RX ADMIN — MIDAZOLAM 2 MG: 1 INJECTION INTRAMUSCULAR; INTRAVENOUS at 09:35

## 2023-01-09 RX ADMIN — ONDANSETRON 4 MG: 2 INJECTION INTRAMUSCULAR; INTRAVENOUS at 10:02

## 2023-01-09 RX ADMIN — PROPOFOL 100 MCG/KG/MIN: 10 INJECTION, EMULSION INTRAVENOUS at 09:50

## 2023-01-09 RX ADMIN — FENTANYL CITRATE 100 MCG: 50 INJECTION INTRAMUSCULAR; INTRAVENOUS at 09:42

## 2023-01-09 RX ADMIN — CEFAZOLIN SODIUM 2000 MG: 2 SOLUTION INTRAVENOUS at 09:40

## 2023-01-09 RX ADMIN — CEFAZOLIN SODIUM 2000 MG: 2 SOLUTION INTRAVENOUS at 14:59

## 2023-01-09 RX ADMIN — OXYCODONE HYDROCHLORIDE 10 MG: 5 TABLET ORAL at 13:38

## 2023-01-09 RX ADMIN — DEXAMETHASONE SODIUM PHOSPHATE 4 MG: 4 INJECTION, SOLUTION INTRAMUSCULAR; INTRAVENOUS at 10:02

## 2023-01-09 RX ADMIN — PROPOFOL 50 MG: 10 INJECTION, EMULSION INTRAVENOUS at 09:50

## 2023-01-09 RX ADMIN — ACETAMINOPHEN 975 MG: 325 TABLET, FILM COATED ORAL at 14:59

## 2023-01-09 RX ADMIN — FENTANYL CITRATE 50 MCG: 50 INJECTION INTRAMUSCULAR; INTRAVENOUS at 12:08

## 2023-01-09 NOTE — ANESTHESIA PREPROCEDURE EVALUATION
Procedure:  ARTHROPLASTY KNEE TOTAL W ROBOT - SAME DAY - PRESS FIT - LEFT (Left: Knee)    Relevant Problems   ANESTHESIA (within normal limits)      CARDIO (within normal limits)      GI/HEPATIC   (+) Gastroesophageal reflux disease without esophagitis      MUSCULOSKELETAL   (+) Patellofemoral arthritis of left knee      PULMONARY   (+) Asthma        Physical Exam    Airway    Mallampati score: II  TM Distance: >3 FB  Neck ROM: full     Dental   No notable dental hx     Cardiovascular  Rhythm: regular, Rate: normal,     Pulmonary  Breath sounds clear to auscultation,     Other Findings        Anesthesia Plan  ASA Score- 2     Anesthesia Type- regional with ASA Monitors  Additional Monitors:   Airway Plan:           Plan Factors-Exercise tolerance (METS): >4 METS  Chart reviewed  Existing labs reviewed  Patient summary reviewed  Patient is not a current smoker  Induction- intravenous  Postoperative Plan- Plan for postoperative opioid use  Planned trial extubation    Informed Consent- Anesthetic plan and risks discussed with patient  I personally reviewed this patient with the CRNA  Discussed and agreed on the Anesthesia Plan with the CRNA  Tiffani Miller

## 2023-01-09 NOTE — ANESTHESIA PROCEDURE NOTES
Spinal Block    Patient location during procedure: OR  Start time: 1/9/2023 9:49 AM  Reason for block: procedure for pain and at surgeon's request  Staffing  Performed: Anesthesiologist   Preanesthetic Checklist  Completed: patient identified, IV checked, site marked, risks and benefits discussed, surgical consent, monitors and equipment checked, pre-op evaluation and timeout performed  Spinal Block  Patient position: sitting  Prep: ChloraPrep  Patient monitoring: cardiac monitor and frequent blood pressure checks  Approach: midline  Location: L3-4  Injection technique: single-shot  Needle  Needle type: pencil-tip   Needle gauge: 25 G  Needle length: 10 cm  Assessment  Sensory level: T8   Injection Assessment:  negative aspiration for heme, no paresthesia on injection and positive aspiration for clear CSF    Post-procedure:  site cleaned

## 2023-01-09 NOTE — PLAN OF CARE
Problem: PHYSICAL THERAPY ADULT  Goal: Performs mobility at highest level of function for planned discharge setting  See evaluation for individualized goals  Description: Treatment/Interventions: Therapeutic exercise, Endurance training, Elevations, LE strengthening/ROM, Functional transfer training, Gait training, Bed mobility, Equipment eval/education, Spoke to nursing  Equipment Recommended: Samantha Lai       See flowsheet documentation for full assessment, interventions and recommendations  Note: Prognosis: Good  Problem List: Decreased strength, Decreased range of motion, Impaired balance, Decreased mobility, Pain  Assessment: Patient is an 50y o  year old female seen for Physical Therapy evaluation  Patient admitted s/p L TKA  Comorbidities affecting patient's physical performance include: none  Personal factors affecting patient at time of initial evaluation include: lives in 2 story house, stairs to enter home, inability to navigate level surfaces without external assistance and inability to perform current job functions  Prior to admission, patient was independent with functional mobility without assistive device and independent with ADLS  Please find objective findings from Physical Therapy assessment regarding body systems outlined above with impairments and limitations including weakness, decreased ROM, gait deviations, pain, decreased activity tolerance, decreased functional mobility tolerance and fall risk  The Barthel Index was used as a functional outcome tool presenting with a score of Barthel Index Score: 65 today indicating moderate limitations of functional mobility and ADLS  Patient's clinical presentation is currently unstable/unpredictable as seen in patient's presentation of changing level of pain, increased fall risk, new onset of impairment of functional mobility and new onset of weakness   Pt would benefit from continued Physical Therapy treatment to address deficits as defined above and maximize level of functional mobility  As demonstrated by objective findings, the assigned level of complexity for this evaluation is high  The patient's AM-PAC Basic Mobility Inpatient Short Form Raw Score is 19  A Raw score of greater than 16 suggests the patient may benefit from discharge to home        PT Discharge Recommendation: Home with outpatient rehabilitation    See flowsheet documentation for full assessment

## 2023-01-09 NOTE — CASE MANAGEMENT
Case Management Discharge Planning Note    Patient name Eron Sood  Location OR POOL/OR POOL MRN [id number]  : 1975 Date 2023       Current Admission Date: 2023  Current Admission Diagnosis:Primary osteoarthritis of left knee, Chronic pain of left knee   Patient Active Problem List    Diagnosis Date Noted   • Family history of colon cancer 2021   • Gastroesophageal reflux disease without esophagitis 2021   • Elevated serum creatinine 2021   • Patellofemoral arthritis of left knee 2020   • Asthma 2019   • Family history of colon cancer in father 2019      LOS (days): 0  Geometric Mean LOS (GMLOS) (days):   Days to GMLOS:     OBJECTIVE:            Current admission status: Outpatient Surgery   Preferred Pharmacy:   [facility]/pharmacy [address] [place] [address]  Phone: [phone] Fax: [phone]    Primary Care Provider: LEONEL Mendoza    Primary Insurance: BLUE CROSS  Secondary Insurance:     DISCHARGE DETAILS:    Discharge planning discussed with[de-identified] Patient  Freedom of Choice: Yes  Comments - Freedom of Choice: Choice for DME is [facility]  DME Referral Provided  Referral made for DME?: Yes  DME referral completed for the following items[de-identified] Katlyn Muniz  DME Supplier Name[de-identified] [facility]    Other Referral/Resources/Interventions Provided:  Interventions: DME  Referral Comments: RW order sent in [place]  Order approved for delivery with $0 copay  RW provided to patient  Delivery ticket signed and scanned back into [facility]       Treatment Team Recommendation: Home  Discharge Destination Plan[de-identified] Home  Transport at Discharge : Family

## 2023-01-09 NOTE — DISCHARGE INSTR - AVS FIRST PAGE
Total Knee Replacement     WHAT YOU SHOULD KNOW:   Total knee replacement is surgery to replace a knee joint damaged by wear, injury, or disease  It is also called a total knee arthroplasty  The knee joint is where your femur (thigh bone) and tibia (large lower leg bone or shin bone) meet  A small bone called the patella (kneecap) protects your knee joint  AFTER YOU LEAVE:     Medicines:   Pain medicine: You may be given a prescription medicine to decrease pain  Do not wait until the pain is severe before you take this medicine  We recommend that you take Tylenol 975mg every 8 hours for baseline pain control  Oxycodone can be used as needed, but no more than 1-2 tablets every 4 to 6 hours  NSAIDs:  These medicines decrease swelling, pain, and fever  NSAIDs are available without a doctor's order  Ask your primary healthcare provider which medicine is right for you  Ask how much to take and when to take it  Take as directed  NSAIDs can cause stomach bleeding and kidney problems if not taken correctly  You will be given Celebrex 100 mg to take twice a day for the first 2 weeks after surgery  Stool softeners  make it easier for you to have a bowel movement  You may need this medicine to treat or prevent constipation  You will be given Colace 100mg to take twice a day    Anticoagulants  are a type of blood thinner medicine that helps prevent clots  Clots can cause strokes, heart attacks, and death  These medicines may cause you to bleed or bruise more easily  You will be given aspirin 325 mg to take twice a day for 6 weeks after surgery  Watch for bleeding from your gums or nose  Watch for blood in your urine and bowel movements  Use a soft washcloth and a soft toothbrush  If you shave, use an electric razor  Avoid activities that can cause bruising or bleeding  Take your medicine as directed  Call your healthcare provider if you think your medicine is not helping or if you have side effects  Tell him if you are allergic to any medicine  Keep a list of the medicines, vitamins, and herbs you take  Include the amounts, and when and why you take them  Bring the list or the pill bottles to follow-up visits  Carry your medicine list with you in case of an emergency  Follow up with your orthopedist as directed: You may need to return to have your wound checked and stitches, staples, or drain removed  Write down your questions so you remember to ask them during your visits  Please make an appointment to see Dr Aj Oliva 2 weeks postoperatively  Physical therapy:  A physical therapist teaches you exercises to help improve movement and strength, and to decrease pain  You will begin outpatient physical therapy later this week as planned  Self-care:   Wound Care:  Please leave the Mepilex dressing in place for 7 days after surgery  After 7 days, you may remove the dressing and leave the incision open to air  If the incision is uncomfortable under clothing after the Mepilex is removed, you may cover it with a a dry sterile dressing  Once the Mepilex dressing has been removed, please keep the incision dry for another week until your follow up appointment  Use ice:  Ice helps decrease swelling and pain  Ice may also help prevent tissue damage  Use an ice pack or put crushed ice in a plastic bag  Cover it with a towel, and place it on your knee for 15 to 20 minutes every hour as directed  Use a cane, walker, or crutches as directed: These devices will help decrease your risk of falling  Your therapist will guide you about transitioning from a walker to cane to no assistive device  Wear pressure stockings: These are long, tight stockings that put pressure on your legs to promote blood flow and prevent clots  You may remove the stocking on your non-operative leg when you get home  The stocking on your operative leg should remain on for 2-3 days   Afterwards, you may put the stocking on or off as needed for swelling  Contact your orthopedist if:  You have a fever over 101 0°F     You have trouble moving or bending your joint  You have increased pain and swelling in your joint, even after you take pain medicine  You have back pain or lower leg pain when you bend your foot upwards  You have questions or concerns about your condition or care  Blood soaks through/saturates your bandage  Your incision comes apart  Your incision is red, swollen, or draining pus  You cannot walk or move your joint, or the limb is numb  Your leg feels warm, tender, and painful  It may look swollen and red  Seek immediate care or call 911 if: You feel like you are going to faint  You have a seizure or feel confused  You feel lightheaded, short of breath, and have chest pain  You have chest pain when you take a deep breath or cough  You may cough up blood  © 2014 [address] is for End User's use only and may not be sold, redistributed or otherwise used for commercial purposes  All illustrations and images included in CareNotes® are the copyrighted property of [employer]  or Alcides Chandler  The above information is an  only  It is not intended as medical advice for individual conditions or treatments  Talk to your doctor, nurse or pharmacist before following any medical regimen to see if it is safe and effective for you

## 2023-01-09 NOTE — H&P
Assessment/Plan:  1  Primary osteoarthritis of left knee  XR knee 3 vw left non injury     Case request operating room: ARTHROPLASTY KNEE TOTAL W ROBOT - SAME DAY - PRESS FIT - LEFT     Comprehensive metabolic panel     Hemoglobin A1C W/EAG Estimation     CBC and differential     Protime-INR     APTT     MRSA culture     Ambulatory referral to Family Practice     Ambulatory referral to Physical Therapy     EKG 12 lead     XR chest pa & lateral     Case request operating room: ARTHROPLASTY KNEE TOTAL W ROBOT - SAME DAY - PRESS FIT - LEFT     Walker     ascorbic acid (VITAMIN C) 500 MG tablet     ferrous sulfate 324 (65 Fe) mg     folic acid (FOLVITE) 1 mg tablet     zinc sulfate (ZINCATE) 220 mg capsule     cholecalciferol (VITAMIN D3) 1,000 units tablet   2  Chronic pain of left knee  Case request operating room: ARTHROPLASTY KNEE TOTAL W ROBOT - SAME DAY - PRESS FIT - LEFT     Comprehensive metabolic panel     Hemoglobin A1C W/EAG Estimation     CBC and differential     Protime-INR     APTT     MRSA culture     Ambulatory referral to Family Practice     Ambulatory referral to Physical Therapy     EKG 12 lead     XR chest pa & lateral     Case request operating room: ARTHROPLASTY KNEE TOTAL W ROBOT - SAME DAY - PRESS FIT - LEFT     Walker     ascorbic acid (VITAMIN C) 500 MG tablet     ferrous sulfate 324 (65 Fe) mg     folic acid (FOLVITE) 1 mg tablet     zinc sulfate (ZINCATE) 220 mg capsule     cholecalciferol (VITAMIN D3) 1,000 units tablet   3  Pre-op exam  Ambulatory referral to Family Practice     Ambulatory referral to Physical Therapy           Scribe Attestation    I,:  Stephanie Garcia am acting as a scribe while in the presence of the attending physician :       I,:  Amparo Medrano, DO personally performed the services described in this documentation    as scribed in my presence  :           Please see below the last office encounter to serve as today's H&P    There have been no changes in the patient's overall medical health or orthopedic exam since last evaluation  Patient denies any recent fever, chills, nausea, vomiting, headache, chest pain, trouble breathing  She has been seen and cleared by her medical subspecialist in preparation for the procedure  She would be a good candidate for aspirin postoperatively for DVT prophylaxis  She will be a good candidate for outpatient physical therapy following discharge from the hospital   All questions addressed today  Proceed the OR for robotic assisted left total knee arthroplasty  Vital signs will be reviewed prior to the case    Ashlyn Rosa is a pleasant 47-year-old female who presents today for initial evaluation of her left knee pain  After reviewing her imaging and performing a thorough history and physical exam I explained that she is symptomatic of her end-stage underlying patellofemoral osteoarthritis  She does also demonstrate moderate degenerative change in the medial tibial femoral compartment  Based on the progression of her underlying disease and her persistent pain despite nonoperative management including activity modification, maintenance of appropriate weight, over-the-counter medications, exercise program, and intra-articular corticosteroid and viscosupplementation injection I explained that she is a good candidate for robotic assisted left total knee arthroplasty  The pre lupe and postoperative expectations were discussed here in the office today  The risks and benefits of undergoing robotic assisted left total knee arthroplasty were discussed at length and consents were signed and placed in the chart   Please see risk discussion below  She denies a history of DVT/PE, MRSA infection, diabetes, malignancy, GI bleeding or peptic ulcer  She is not a smoker or excessive drinker, and is COVID vaccinated  She understands she requires preoperative clearance from her primary care physician    She is an excellent candidate for outpatient physical therapy postoperatively and for our same-day discharge pathway  We will utilize Press-Fit implants for her  She will meet with my surgery scheduler today to pick a date for procedure and make preoperative arrangements  All of her questions and concerns were addressed today  We will see her back at time of surgery      Subjective:  Initial evaluation for left knee pain     Patient ID: Laurent Bullard is a 50 y o  female who presents today for initial evaluation of her left knee pain  At today's visit, she reports activity related pain in her left knee for at least the last 10 years  She has been receiving nonoperative care for her knee for about that long  Initially, she was treated at [place] [address]  and underwent corticosteroid and viscosupplementation injections  More recently, she was treated by Dr Promise Ho who performed a Synvisc-One injection on 07/21/2022  She reports this was only effective for her for about 2 months  At today's visit, she complains of severe aching activity-related pain about her anterior knee  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  She does use ice and meloxicam daily which provides some benefit for her  She is interested in discussing surgery  She denies any recent injury or trauma      Review of Systems   Constitutional: Positive for activity change  Negative for chills, fever and unexpected weight change  HENT: Negative for hearing loss, nosebleeds and sore throat  Eyes: Negative for pain, redness and visual disturbance  Respiratory: Negative for cough, shortness of breath and wheezing  Cardiovascular: Negative for chest pain, palpitations and leg swelling  Gastrointestinal: Negative for abdominal pain, nausea and vomiting  Endocrine: Negative for polydipsia and polyuria  Genitourinary: Negative for dysuria and hematuria  Musculoskeletal: Positive for arthralgias and myalgias  Negative for joint swelling  See HPI   Skin: Negative for rash and wound  Neurological: Negative for dizziness, numbness and headaches  Psychiatric/Behavioral: Negative for decreased concentration and suicidal ideas  The patient is not nervous/anxious              Medical History        Past Medical History:   Diagnosis Date   • Arthritis     • Asthma     • Contact lens/glasses fitting     • Family history of colon cancer       hep C-liver cancer- to colon cancer-father   • GERD (gastroesophageal reflux disease)     • Obesity (BMI 30 0-34  9)              Surgical History         Past Surgical History:   Procedure Laterality Date   •  SECTION         ,    • DILATION AND CURETTAGE OF UTERUS        miscarriage   • EGD       • ROTATOR CUFF REPAIR Right     • SHOULDER SURGERY      • TONSILLECTOMY       • TUBAL LIGATION               Family History   Family History   Problem Relation Age of Onset   • Heart disease Mother           pacemaker   • Coronary artery disease Mother     • Hypertension Mother           pulmonary   • Colon cancer Father     • Liver cancer Father     • Hepatitis Father     • Cancer Father            - colon cancer            Social History      Occupational History   • Not on file   Tobacco Use   • Smoking status: Never Smoker   • Smokeless tobacco: Never Used   Vaping Use   • Vaping Use: Never used   Substance and Sexual Activity   • Alcohol use:  Yes       Comment: social   • Drug use: Never   • Sexual activity: Yes       Partners: Male       Birth control/protection: OCP            Current Outpatient Medications:   •  ascorbic acid (VITAMIN C) 500 MG tablet, Take 1 tablet (500 mg total) by mouth 2 (two) times a day, Disp: 60 tablet, Rfl: 0  •  cetirizine (ZyrTEC) 10 mg tablet, Take 10 mg by mouth daily, Disp: , Rfl:   •  cholecalciferol (VITAMIN D3) 1,000 units tablet, Take 1 tablet (1,000 Units total) by mouth daily, Disp: 30 tablet, Rfl: 0  •  famotidine (PEPCID) 20 mg tablet, Take 1 tablet (20 mg total) by mouth 2 (two) times a day, Disp: 60 tablet, Rfl: 11  •  ferrous sulfate 324 (65 Fe) mg, Take 1 tablet (324 mg total) by mouth daily before breakfast, Disp: 30 tablet, Rfl: 0  •  fluconazole (DIFLUCAN) 150 mg tablet, , Disp: , Rfl:   •  folic acid (FOLVITE) 1 mg tablet, Take 1 tablet (1 mg total) by mouth daily, Disp: 30 tablet, Rfl: 0  •  ibuprofen (MOTRIN) 800 mg tablet, Take 1 tablet (800 mg total) by mouth every 6 (six) hours as needed for moderate pain, Disp: 30 tablet, Rfl: 1  •  lansoprazole (PREVACID) 30 mg capsule, as needed  , Disp: , Rfl:   •  Levonorgest-Eth Estrad 91-Day 0 15-0 03 &0 01 MG TABS, Take 1 tablet by mouth every morning  , Disp: , Rfl: 2  •  meloxicam (MOBIC) 15 mg tablet, Take 15 mg by mouth daily, Disp: , Rfl:   •  montelukast (SINGULAIR) 10 mg tablet, Take 10 mg by mouth daily, Disp: , Rfl:   •  pimecrolimus (ELIDEL) 1 % cream, MIX WITH KETOCONAZOLE CREAM BEFORE APPLYING TO FACE TWICE DAILY, Disp: , Rfl:   •  zinc sulfate (ZINCATE) 220 mg capsule, Take 1 capsule (220 mg total) by mouth daily, Disp: 30 capsule, Rfl: 0  •  fluticasone-vilanterol (Breo Ellipta) 100-25 mcg/inh inhaler, Inhale 1 puff daily Rinse mouth after use , Disp: 60 blister, Rfl: 3     No Active Allergies     Objective:      Vitals:     11/10/22 1451   BP: 150/88   Pulse: 87         Body mass index is 39 11 kg/m²      Left Knee Exam      Tenderness   The patient is experiencing tenderness in the patella      Range of Motion   Extension: 0   Flexion: 120      Tests   Varus: negative Valgus: negative  Drawer:  Anterior - negative     Posterior - negative     Other   Erythema: absent  Scars: absent  Sensation: normal  Pulse: present  Swelling: none  Effusion: no effusion present     Comments:  Stable at 0, 30 and 90 degrees  Neurovascularly in tact distally  No warmth or erythema  Parapatellar crepitance noted  Patellofemoral grind: positive              Observations   Left Knee   Negative for effusion          Physical Exam  Vitals and nursing note reviewed  Constitutional:       Appearance: She is well-developed  HENT:      Head: Normocephalic and atraumatic  Eyes:      General: No scleral icterus  Extraocular Movements: Extraocular movements intact  Conjunctiva/sclera: Conjunctivae normal    Cardiovascular:      Rate and Rhythm: Normal rate  Pulmonary:      Effort: Pulmonary effort is normal  No respiratory distress  Musculoskeletal:      Cervical back: Normal range of motion and neck supple  Left knee: No effusion  Comments: As noted in HPI   Skin:     General: Skin is warm and dry  Neurological:      Mental Status: She is alert and oriented to person, place, and time  Psychiatric:         Behavior: Behavior normal             I have personally reviewed pertinent films in PACS  X-ray of the left knee obtained on 11/10/2022 reviewed demonstrating end-stage patellofemoral degenerative change with bone-on-bone contact  There is moderate tibial femoral degenerative change with narrowing  There is tricompartmental sclerosis and osteophytosis  There is no acute fracture, dislocation, lytic or blastic lesion    MRI of the left knee obtained on 10/28/2022 reviewed confirming degenerative changes in the medial and patellofemoral compartments      The patient was counseled in detail regarding the diagnosis, the treatment options available, the prognosis of each treatment option, the potential risks and complications  Miles Less are, but are not limited to; deep vein thrombosis, pulmonary embolism, neurologic and vascular injury, infection, instability, leg length discrepancy, dislocation, hematoma, reflex sympathetic dystrophy, loss of range of motion, ankylosis of the knee, fracture, screw or prosthetic perforation, chronic pain, acute pain, chronic leg pain and edema, loosening, death, heart attack, and stroke   The patient's questions were answered in detail   The patient demonstrates understanding of these risks and wishes to proceed with surgery      This document was created using speech voice recognition software  Grammatical errors, random word insertions, pronoun errors, and incomplete sentences are an occasional consequence of this system due to software limitations, ambient noise, and hardware issues  Any formal questions or concerns about content, text, or information contained within the body of this dictation should be directly addressed to the provider for clarification  no melena/no vomiting/no abdominal pain/no diarrhea

## 2023-01-09 NOTE — PERIOPERATIVE NURSING NOTE
Vitals wdl, patient able to tolerate food, fluids, and void post procedure, dressing clean dry intact, prescriptions sent to pharmacy, discharge education provided to patient and spouse, both stated understanding, IV access removed, left floor via wheelchair, discharged to home,

## 2023-01-09 NOTE — PROGRESS NOTES
Progress Note - Orthopedics   Alyce Pels 50 y o  female MRN: [id number]  Unit/Bed#: JOHNNY FLYNN Encounter: [id number]    Assessment:  1) POD#0 s/p RA L TKA    Plan:  Ancef 2g IV completed perioperatively  DVT prophylaxis: Aspirin 325 p o  twice daily/SCD's/Ambulation  WBAT LLE  PT/OT- WBAT LLE  Analgesia PRN  Dressing- monitor for drainage, dressing may stay in place x7 days  Discharge planning -patient is done well postoperatively  Pain is controlled  She has passed her PT and nursing protocols for same-day discharge  Discharge instructions were discussed personally by me at bedside and all of her questions were addressed  Patient may be discharged home in stable condition today to start outpatient physical therapy later this week  I will see her back in 2 weeks time for continued postoperative care    Weight bearing: WBAT LLE    VTE Pharmacologic Prophylaxis: ASA 325mg PO BID  VTE Mechanical Prophylaxis: sequential compression device    Subjective: Patient seen and examined postoperatively with family present  Pain controlled  Events of surgery discussed with the patient and the patient's family  Vitals: Blood pressure 135/65, pulse 70, temperature 97 6 °F (36 4 °C), temperature source Temporal, resp  rate 18, weight 106 kg (234 lb), last menstrual period 10/04/2022, SpO2 96 %, not currently breastfeeding  ,Body mass index is 38 94 kg/m²  Intake/Output Summary (Last 24 hours) at 1/9/2023 1705  Last data filed at 1/9/2023 1157  Gross per 24 hour   Intake 800 ml   Output 10 ml   Net 790 ml       Invasive Devices     None                 Physical Exam: NAD  Ortho Exam: LLE: Dsg c/d/i, compartments soft, calf non-tender, +PF/DF/EHL, +DP/SP/Saph/Sural SILT, DP 2+, foot warm    Lab, Imaging and other studies: PO XR L knee: Reviewed and acceptable    Elenita SCOTT    Division of Adult Reconstruction  Department of Orthopaedic Surgery  [facility]

## 2023-01-09 NOTE — OP NOTE
OPERATIVE REPORT  PATIENT NAME: Camden Garcia    :  1975  MRN: [id number]  Pt Location: [facility] OR ROOM 01    SURGERY DATE: 2023    Surgeon(s) and Role:     * Elena Pina, DO - Primary     * Deborah Parikh PA-C - Assisting, no qualified resident was available an assistant was needed for patient positioning, prepping and draping, soft tissue retraction, protection of vital structures throughout the case, exposure of the femur and tibia for robotic assisted resection and implantation of final prosthesis, superficial closure, and to complete the case safely  Lisa Delaney ATC/OTC-assisting    Preop Diagnosis:  Primary osteoarthritis of left knee [M17 12]  Chronic pain of left knee [M25 562, G89 29]    Post-Op Diagnosis Codes:     * Primary osteoarthritis of left knee [M17 12]     * Chronic pain of left knee [M25 562, G89 29]    Procedure(s) (LRB):  ARTHROPLASTY KNEE TOTAL W ROBOT - SAME DAY - PRESS FIT - LEFT (Left)    Specimen(s):  * No specimens in log *    Estimated Blood Loss:   10 mL    Drains: None    Anesthesia Type:   Spinal with sedation, postoperative abductor canal block with Exparel    Antibiotics: Ancef 2 g    Intravenous fluids: 800 cc crystalloid    Urine output: No Vasquez    Tourniquet time: 63 minutes at 250 mmHg    Implant Name Type Inv   Item Serial No   Lot No  LRB No  Used Action   CEMENT BN 40 GM PALACOS RADIOPAQUE W/O ANTIBIOTIC - [id number]  CEMENT BN 40 GM PALACOS RADIOPAQUE W/O ANTIBIOTIC  [employer] [id number] Left 1 Implanted   COMPONENT FEM SZ 6 LT NRW  CR ATTUNE - [id number]  COMPONENT FEM SZ 6 LT NRW  CR ATTUNE  DEPUY [id number] Left 1 Implanted   COMPONENT PATELLAR 32MM MEDIAL DOME ATTUNE - [id number]  COMPONENT PATELLAR 32MM MEDIAL DOME ATTUNE  DEPUY [id number] Left 1 Implanted   INSERT TIB SZ 6 5MM LT FX BRNG MED STAB ATTUNE - [id number]  INSERT TIB SZ 6 5MM LT FX BRNG MED STAB ATTUNE  DEPUY [id number] Left 1 Implanted   BASEPLATE TIBIAL SZ 4 FX BRNG  ATTUNE - [id number]  BASEPLATE TIBIAL SZ 4 FX KEITHNG  Check-CapJOSIAH  DEPUY [id number] Left 1 Implanted       Operative Indications:  Primary osteoarthritis of left knee [M17 12]  Chronic pain of left knee [M25 562, G89 29]  The patient is a 75-year-old female known to me for treatment of her activity related left knee pain  The patient has attempted multiple forms of conservative measures of treatment and all have failed to provide long-lasting relief of her discomfort  With failed conservative treatment, persistent activity related pain, and severe osteoarthritis in her patellofemoral joint and moderate osteoarthritis in the medial compartment I recommended that she undergo robotic assisted left total knee arthroplasty  She was optimized for the intended procedure by her medical subspecialist and underwent robotic assisted left total knee arthroplasty on 1/9/2023  Operative Findings:  Intraoperatively the patient was found to have grade 4 degenerative change in the patellofemoral joint as well as in the medial compartment with focal grade 4 changes on the medial femoral condyle  Range of motion was from 0 to 135 degrees with the limb being in approximately 1 degree of varus preoperatively  Robotic assisted left total knee arthroplasty was successfully performed and press-fit implants were implanted without issue  Final construct had excellent range of motion from 0 to 135 degrees with excellent stability at 0 degrees 30 degrees 9 degrees  The patella tracked midline and flat  The limb was restored to 0 degrees of varus  The patient tolerated procedure well  There were no complications  Complications:   None    Procedure and Technique:  The patient was identified and marked in the preoperative holding area  Final questions were addressed  Consents were visualized for correct laterality and the intended procedure   Patient was taken back to the operating room where they were transferred to the operating room table and administered spinal anesthesia by the anesthesia staff  Tourniquet was then applied to the left thigh  The right lower extremity was draped over the foot break in the bed after the split leg attachment was removed, and the popliteal fossa was well padded and the leg was secured in the flexed position off of the operating table  The left lower extremity was prepped and draped in the standard sterile fashion with the addition of the knee positioner  The patient was administered 2 g of IV Ancef  Tranexamic acid  was administered by the anesthesia staff  A final timeout was performed and all members of the operating room staff were in agreement of the intended procedure and the correct laterality was confirmed  An anterior knee incision was marked over the anterior left knee and carried over the medial portion of the patella down medial to the tibial tubercle  The leg was exsanguinated and the tourniquet was inflated to 250 mmHg  An incision was made over the anterior knee using a scalpel, and sharp dissection was carried deep through Branden's fascia creating full thickness flaps  The extensor mechanism was identified  A standard medial parapatellar arthrotomy was performed using a scalpel, and upon doing so benign-appearing yellow intra-articular fluid was expressed  The anterior horn of the medial and lateral meniscus was removed  50% of the patellar fat pad was removed for proper exposure  The distal arthrotomy was used to initiate a medial release around the proximal tibia at the joint line to the mid coronal plane  On inspection there was diffuse grade 4 degenerative change in the medial compartment and patellofemoral compartment  Changes in the medial compartment along the medial femoral condyle or focal grade 4 changes in nature  Preparations were made for robotic assisted total knee arthroplasty    The periarticular osteophytes were removed from around the distal femur, proximal tibia, and intercondylar notch  The remnants of the ACL and the PCL were removed electrocautery  The visualized portions of the medial and lateral meniscus were removed  The distal femur and proximal tibial arrays were placed without complication  The checkpoints were created the distal femur proximal tibia  The hip center was captured  Anatomic registration was carried out in sequence  Range of motion was evaluated and the knee was in 1 degree of varus, and the range of motion was from 0-135 degrees  The Proadjust graph was created  Through manipulating the position of the femoral and tibial implants a well-balanced Proadjust graph was created and this was excepted as the intraoperative plan  The robot was brought into the surgical field  The retractors were placed around the distal femur proximal tibia  Robotic assisted resection was performed of the distal femur in sequence without damage to the periarticular tissues  The tibia was subluxed anteriorly and the proximal tibia was resected without complication  All bony fragments were removed and resection appeared to be complete  The knee was brought out into the full extension in the posterior compartment was evaluated  The remnants of the medial and lateral meniscus were removed with electrocautery  Based on the intraoperative plan a size 6 femoral notch guide was placed and the trochlea was prepared  It was evident that the patient would benefit from a narrow prosthesis   The size 6 left CR trial was placed upon the distal femur and a size 5 polyethylene insert was placed into the articulation  The knee was cycled through a range of motion and is range of motion was evaluated  The overall alignment of the limb was restored to 0 of varus, and range of motion was from 0-135 degrees  he had excellent stability at 0 degrees 30 degrees 9 degrees  This was deemed to be the final construct    The arrays were removed from the distal femur proximal tibia without complication  The lug holes in the femur were drilled  The trials were removed and the tibia was subluxed anteriorly  The tibia was sized and was found that a size 4 base plate would be appropriate  This was aligned with the medial 1/3 of the proximal tibial tubercle and pinned into place  The tibia was then reamed, broached, and finished in sequence for press-fit prosthesis  The base plate was removed  Attention was then turned to the patella  The osteo synovial reflection was revealed using a Bovie  The patella height measured 22 millimeters prior to resection  The patella was sized and found to be a 32 mm patellar button  The cutting guide was set for the appropriate depth and the patella was evenly resected using oscillating saw  The 32 mm patellar button was then medialized over the patella and the lug holes were drilled  A trial patella button was placed upon the patella and the pre osteotomy patellar height was restored  A lateral facetectomy of the patella was performed  The soft tissues of the posterior capsule were cauterized using Bovie to ensure hemostasis  The posterior capsule and medial and lateral periarticular soft tissues were injected with a periarticular analgesic cocktail  The knee was again irrigated in preparation for plantation  The tibia was subluxed and all retractors were placed  The size 4 press-fit fixed-bearing tibial prosthesis was impacted upon the proximal tibia down to his final position where good bony apposition was obtained  The size 5 mm medial stabilized AOX CR polyethylene insert was locked into place  The femur was exposed and the size 6 narrow left CR press-fit femoral prosthesis was impacted upon the distal femur down to its final position  Appropriate bony apposition was obtained  1 bag of Palacos cement without gentamicin was mixed on the back table and the final size 32 mm polyethylene button was cemented upon the patella  Excess cement was removed  As the cement cured the remainder of the periarticular pain cocktail was injected into the soft tissues around the knee  Irrigation then followed with IrriSept followed by normal saline solution  After the cement had cured the joint was inspected for any residual loose bodies of cement and these were removed  The tourniquet was released after 63 minutes at 250 mmHg  The tracking and stability of the final components were assessed  The patella tracked midline without tilt, and the knee was stable in both flexion and extension, coronal stability was unchanged, and the knee demonstrated range of motion from 0-135°  The knee was again irrigated and a very conservative partial synovectomy was performed  Hemostasis was achieved using electrocautery  Closure began using a #2 barbed bidirectional suture for the arthrotomy  After closure of the arthrotomy range of motion to gravity was tested and was found to be 0-135° of flexion  Quarter percent Marcaine plain was injected in the subcutaneous soft tissues  The deep subcutaneous tissues were reapproximated with undyed 0 Vicryl, the superficial subcutaneous tissues were reapproximated with undyed 2-0 Vicryl, the skin edges reapproximated with a running 3-0 bidirectional barbed Monocryl suture, and the skin edges were sealed with Exofin  After the excision had dried a silver impregnated Mepilex dressing was placed over the incision  The drapes were removed and PAULY stockings were placed on the bilateral lower extremities  Patient was then awakened from anesthesia without difficulty, and transferred to PACU in stable condition        I was present for all critical portions of the procedure    Patient Disposition:  PACU         SIGNATURE: Lyn Arzate DO  DATE: January 9, 2023  TIME: 3:59 PM

## 2023-01-09 NOTE — PHYSICAL THERAPY NOTE
PT EVALUATION     01/09/23 9279   Note Type   Note type Evaluation   Pain Assessment   Pain Assessment Tool 0-10   Pain Score 5  (RN aware)   Pain Location/Orientation Orientation: Left; Location: Knee  (Pt had pain meds prior to PT )   Restrictions/Precautions   Weight Bearing Precautions Per Order Yes   LLE Weight Bearing Per Order WBAT   Home Living   Type of [address] [place] [address] Two level;1/2 bath on main level;Bed/bath upstairs  (2 JAMIA)   Home Equipment Cane   Prior Function   Level of [place] Independent with ADLs; Independent with functional mobility   Lives [facility]   Additional Pertinent History Pt is POD zero s/p L TKA  Cognition   Overall Cognitive Status WFL   Orientation Level Oriented X4   Subjective   Subjective "My knee hurts so much"   RLE Assessment   RLE Assessment   (ROM WFL, MMT 4+/5)   LLE Assessment   LLE Assessment   (knee ROM 10-75, MMT hip2+/5, knee 2-/5/poor to fair quad set, ankle 4-/5)   Bed Mobility   Supine to Sit 4  Minimal assistance   Additional items LE management   Transfers   Sit to Stand 4  Minimal assistance   Stand to Sit 4  Minimal assistance   Ambulation/Elevation   Gait Assistance Not tested  (Pt's L knee buckling with WB)   Balance   Static Sitting Good   Static Standing Fair -   Assessment   Prognosis Good   Problem List Decreased strength;Decreased range of motion; Impaired balance;Decreased mobility;Pain   Assessment Patient is an 50y o  year old female seen for Physical Therapy evaluation  Patient admitted s/p L TKA  Comorbidities affecting patient's physical performance include: none  Personal factors affecting patient at time of initial evaluation include: lives in 2 story house, stairs to enter home, inability to navigate level surfaces without external assistance and inability to perform current job functions  Prior to admission, patient was independent with functional mobility without assistive device and independent with ADLS    Please find objective findings from Physical Therapy assessment regarding body systems outlined above with impairments and limitations including weakness, decreased ROM, gait deviations, pain, decreased activity tolerance, decreased functional mobility tolerance and fall risk  The Barthel Index was used as a functional outcome tool presenting with a score of Barthel Index Score: 50 today indicating marked limitations of functional mobility and ADLS  Patient's clinical presentation is currently unstable/unpredictable as seen in patient's presentation of changing level of pain, increased fall risk, new onset of impairment of functional mobility and new onset of weakness  Pt would benefit from continued Physical Therapy treatment to address deficits as defined above and maximize level of functional mobility  As demonstrated by objective findings, the assigned level of complexity for this evaluation is high  The patient's AM-PAC Basic Mobility Inpatient Short Form Raw Score is 15  A Raw score of greater than 16 suggests the patient may benefit from discharge to [facility] however anticipate as pt's pain is controlled pt will be able to ambulate with a walker and negotiate the steps and be able to go home with family and start OP PT next week  Pt unable to ambulate this session due to c/o pain so RN asked for pain meds and this PT will return to assess ambulation on level surfaces and stairs in about 1 hour      Goals   Patient Goals "walk without pain"   STG Expiration Date 01/16/23   Short Term Goal #1 independent HEP designed to improve L knee ROM and strength, independent ambulation indoor level surfaces 150 feet with a walker with a steady non antalgic gait, improve L knee ROM to 0-95, independent up and down 10 steps with a rail and a cane so pt can get to her second floor bedroom   LTG Expiration Date 01/23/23   Long Term Goal #1 improve L knee strength to at least 4+/5, improve L knee ROM to 0-105, independent ambulation outdoor level surfaces with least restrictive device x 250 feet with a non antalgic gait   Plan   Treatment/Interventions Therapeutic exercise; Endurance training;Elevations;LE strengthening/ROM; Functional transfer training;Gait training;Bed mobility; Equipment eval/education;Spoke to nursing   PT Frequency Twice a day   Recommendation   PT Discharge Recommendation Home with outpatient rehabilitation   Equipment Recommended Katlyn Muniz CM aware   Galilea 74 walker   Change/add to [email or web address]? No   AM-PAC Basic Mobility Inpatient   Turning in Flat Bed Without Bedrails 4   Lying on Back to Sitting on Edge of Flat Bed Without Bedrails 3   Moving Bed to Chair 3   Standing Up From Chair Using Arms 3   Walk in Room 1   Climb 3-5 Stairs With Railing 1   Basic Mobility Inpatient Raw Score 15   Basic Mobility Standardized Score 36 97   Highest Level Of Mobility   -[facility] Goal 4: Move to chair/commode   -HL Achieved 3: Sit at edge of bed  (pt reports too much pain to get OOB)   Barthel Index   Feeding 10   Bathing 0   Grooming Score 0   Dressing Score 5   Bladder Score 10   Bowels Score 10   Toilet Use Score 5   Transfers (Bed/Chair) Score 10   Mobility (Level Surface) Score 0   Stairs Score 0   Barthel Index Score 50   End of Consult   Patient Position at End of Consult All needs within reach; Supine   Licensure   [place] License Number  Arbrittnia Carter PT [id number]

## 2023-01-09 NOTE — PLAN OF CARE
Problem: OCCUPATIONAL THERAPY ADULT  Goal: Performs self-care activities at highest level of function for planned discharge setting  See evaluation for individualized goals  Description: Treatment Interventions: ADL retraining, Functional transfer training, Endurance training, Equipment evaluation/education, Patient/family training, Compensatory technique education, Continued evaluation  Equipment Recommended: Bedside commode       See flowsheet documentation for full assessment, interventions and recommendations  Note: Limitation: Decreased ADL status, Decreased endurance, Decreased self-care trans, Decreased high-level ADLs  Prognosis: Good  Assessment: Pt is a 50 y o  female seen for OT evaluation s/p admit to [address] [place] [address] on 1/9/2023 w/ LTKA  Orders received for OT evaluation and treatment  Pt identified during session via name and wristband  Comorbidities affecting patient at time of evaluation include: asthma  Personal factors affecting patient at time of evaluation include: steps to enter, difficulty performing ADLs and difficulty performing IADLs  PTA, patient was independent with functional mobility without assistive device, independent with ADLS, independent with IADLS, living with family in a 2 level home with 2 steps to enter and works full time  The evaluation identifies the following performance deficits: weakness, decreased endurance, increased fall risk, new onset of impairment of functional mobility, decreased ADLS, decreased IADLS, pain, decreased activity tolerance, ortheopedic restrictions and decreased strength, that result in activity limitations (as is outlined above in flowsheet)   Based on the OT evaluation outcomes, functional performance deficits, and assessment findings, pt has been identified as high complexity, because the patient presents with comorbidites that affect occupational performance and required significant modification of tasks or assistance with consideration of multiple treatment options  The patient's raw score on the AM-PAC Daily Activity inpatient short form is 21, standardized score is 44 27, greater than 39 4  From an OT standpoint, patients at this level may benefit from d/c to No rehabilitation needs with increased social support for IADL completion  Pt will benefit from continued IPOT treatment to address above deficits and maximize level of independence with ADLs and functional mobility in the areas of: bathing/ shower, dressing, toilet hygiene, transfer to all surfaces and functional ambulation       OT Discharge Recommendation: No rehabilitation needs     William Dorsey, OTR/L

## 2023-01-09 NOTE — OCCUPATIONAL THERAPY NOTE
Occupational Therapy Evaluation      Alyce Pels    2023    Patient Active Problem List   Diagnosis    Asthma    Family history of colon cancer in father    Patellofemoral arthritis of left knee    Elevated serum creatinine    Family history of colon cancer    Gastroesophageal reflux disease without esophagitis       Past Medical History:   Diagnosis Date    Arthritis     Asthma     Contact lens/glasses fitting     Family history of colon cancer     hep C-liver cancer- to colon cancer-father    GERD (gastroesophageal reflux disease)     Obesity (BMI 30 0-34  9)        Past Surgical History:   Procedure Laterality Date     SECTION      ,     DILATION AND CURETTAGE OF UTERUS      miscarriage    EGD      ROTATOR CUFF REPAIR Right     SHOULDER SURGERY      TONSILLECTOMY      TUBAL LIGATION  23 1516   OT Last Visit   OT Visit Date 23   Note Type   Note type Evaluation   Pain Assessment   Pain Assessment Tool 0-10   Pain Score 4   Pain Location/Orientation Orientation: Left; Location: Knee   Pain Onset/Description Onset: Ongoing; Descriptor: Aching   Effect of Pain on Daily Activities limits activity tolerance and comfort   Patient's Stated Pain Goal No pain   Hospital Pain Intervention(s) Medication (See MAR); Repositioned; Ambulation/increased activity; Emotional support   Restrictions/Precautions   Weight Bearing Precautions Per Order Yes   LLE Weight Bearing Per Order WBAT   Other Precautions Bed Alarm;Multiple lines; Fall Risk;Pain   Home Living   Type of [address] [place] [address] Two level;1/2 bath on main level;Bed/bath upstairs;Stairs to enter with rails  (2 JAMIA)   Bathroom Shower/Tub Tub/shower unit   Bathroom Toilet Standard   Bathroom Equipment Grab bars in shower;Grab bars around toilet    [address]   Prior Function   Level of [place] Independent with ADLs; Independent with functional mobility; Independent with IADLS Lives With Family  (2 daughter; sig other will be staying with pt upon DC)   Receives Help From Family   IADLs Independent with driving; Independent with meal prep; Independent with medication management   Falls in the last 6 months 0   Vocational Full time employment  ( at 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   General   Family/Caregiver Present Yes  (Sig other present for session)   Additional General Comments Pt POD#0 LTKA   Subjective   Subjective "I haven't shaved my legs in a week"   ADL   Where Assessed Edge of bed   Eating Assistance 7  Independent   Eating Deficit Beverage management   Grooming Assistance 6  Modified Independent   UB Bathing Assistance Unable to assess   LB Bathing Assistance Unable to assess   UB Dressing Assistance 6  Modified independent   UB Dressing Deficit Setup; Thread RUE; Thread LUE;Pull over head;Pull down in back  (sitting EOB)   LB Dressing Assistance 4  Minimal Assistance   LB Dressing Deficit Setup;Verbal cueing;Supervision/safety; Increased time to complete; Thread RLE into pants; Thread LLE into pants; Thread LLE into underwear; Thread RLE into underwear;Pull up over hips  (sitting EOB)   Toileting Assistance  Unable to assess   Additional Comments When donning underwear/pants to hips in stance, pt with two eipsodes of LLE knee buckle with LOB to L and required min A to correct  Unilateral support on RW and educated on safe technique     Bed Mobility   Supine to Sit 5  Supervision   Additional items Assist x 1;HOB elevated; Increased time required;Verbal cues;LE management  (use of RLE hook technique to advance LLE to EOB)   Sit to Supine Unable to assess   Additional Comments Pt OOB to recliner at end of session   Transfers   Sit to Stand 5  Supervision  (close supervision)   Additional items Assist x 1; Increased time required;Verbal cues  (VC for safe hand placement)   Stand to Sit 5  Supervision   Additional items Assist x 1; Increased time required;Verbal cues  (VC for safe hand placement)   Toilet transfer   (While not observed, pt educated on toilet transfer technique upon DC and reports her sig other will be installing a grab bar for pt to use)   Car transfer   (Pt educated on car transfer technique and reports she has worked on this extensively with her OPPT prior to surgery )   Functional Mobility   Functional Mobility 4  Minimal assistance   Additional Comments Few steps EOB>recliner with min A x 1 and RW  Pt with difficulty bearing full weight through LLE due to knee buckling and supported most of weight with BUEs on RW   Additional items Rolling walker   Balance   Static Sitting Good   Dynamic Sitting Fair +   Static Standing Fair -   Dynamic Standing Poor +   Activity Tolerance   Activity Tolerance Patient limited by pain  (limited by LLE knee buckle with associated numbness)   Medical Staff Made Aware Spoke to PT [address] yes, RN ok to see pt and present for pain medication   RUE Assessment   RUE Assessment WFL   LUE Assessment   LUE Assessment WFL   Hand Function   Gross Motor Coordination Functional   Fine Motor Coordination Functional   Sensation   Light Touch No apparent deficits   Vision-Basic Assessment   Current Vision Does not wear glasses   Cognition   Overall Cognitive Status WFL   Arousal/Participation Alert; Cooperative   Attention Within functional limits   Orientation Level Oriented X4   Memory Within functional limits   Following Commands Follows multistep commands without difficulty   Comments Pleasant and agreeable  Good insight into deficits, VC for safety at times  Assessment   Limitation Decreased ADL status; Decreased endurance;Decreased self-care trans;Decreased high-level ADLs   Prognosis Good   Assessment Pt is a 50 y o  female seen for OT evaluation s/p admit to [address] [place] [address] on 1/9/2023 w/ LTKA  Orders received for OT evaluation and treatment  Pt identified during session via name and wristband  Comorbidities affecting patient at time of evaluation include: asthma  Personal factors affecting patient at time of evaluation include: steps to enter, difficulty performing ADLs and difficulty performing IADLs  PTA, patient was independent with functional mobility without assistive device, independent with ADLS, independent with IADLS, living with family in a 2 level home with 2 steps to enter and works full time  The evaluation identifies the following performance deficits: weakness, decreased endurance, increased fall risk, new onset of impairment of functional mobility, decreased ADLS, decreased IADLS, pain, decreased activity tolerance, ortheopedic restrictions and decreased strength, that result in activity limitations (as is outlined above in flowsheet)  Based on the OT evaluation outcomes, functional performance deficits, and assessment findings, pt has been identified as high complexity, because the patient presents with comorbidites that affect occupational performance and required significant modification of tasks or assistance with consideration of multiple treatment options  The patient's raw score on the AM-PAC Daily Activity inpatient short form is 21, standardized score is 44 27, greater than 39 4  From an OT standpoint, patients at this level may benefit from d/c to No rehabilitation needs with increased social support for IADL completion   Pt will benefit from continued IPOT treatment to address above deficits and maximize level of independence with ADLs and functional mobility in the areas of: bathing/ shower, dressing, toilet hygiene, transfer to all surfaces and functional ambulation  Goals   Patient Goals to walk without pain   LTG Time Frame 10-14   Long Term Goal #1 see goals below   Plan   Treatment Interventions ADL retraining;Functional transfer training; Endurance training;Equipment evaluation/education;Patient/family training; Compensatory technique education;Continued evaluation   Goal Expiration Date 01/19/23   OT Treatment Day 0   OT Frequency 3-5x/wk   Recommendation   OT Discharge Recommendation No rehabilitation needs   Equipment Recommended Bedside commode   Commode Type Standard   AM-PAC Daily Activity Inpatient   Lower Body Dressing 3   Bathing 3   Toileting 3   Upper Body Dressing 4   Grooming 4   Eating 4   Daily Activity Raw Score 21   Daily Activity Standardized Score (Calc for Raw Score >=11) 44 27   AM-PAC Applied Cognition Inpatient   Following a Speech/Presentation 4   Understanding Ordinary Conversation 4   Taking Medications 4   Remembering Where Things Are Placed or Put Away 4   Remembering List of 4-5 Errands 4   Taking Care of Complicated Tasks 4   Applied Cognition Raw Score 24   Applied Cognition Standardized Score 62 21   End of Consult   Patient Position at End of Consult Bedside chair;Bed/Chair alarm activated; All needs within reach   Nurse Communication Nurse aware of consult     GOALS:    *Goals established to promote patient goal of to walk without pain:      *ADL transfers with (I) for inc'd independence with ADLs/purposeful tasks    *LB ADL with (I) using AE prn for inc'd independence with self cares    *Toileting with (I) for clothing management and hygiene to increase hygiene/thoroughness in order to reduce caregiver burden    *Increase stand tolerance x 5  m for inc'd tolerance with standing purposeful tasks    *Bed mobility- (I) for inc'd independence to manage own comfort and initiate EOB & OOB purposeful tasks    *Patient will verbalize 3 safety awareness/ principles to prevent falls in the home setting       *Patient will increase functional mobility to and from bathroom with rolling walker independently to increase independence with toileting    Hiignio Givens OTR/L

## 2023-01-09 NOTE — ANESTHESIA POSTPROCEDURE EVALUATION
Post-Op Assessment Note    CV Status:  Stable  Pain Score: 0    Pain management: adequate     Mental Status:  Alert and awake   Hydration Status:  Euvolemic   PONV Controlled:  Controlled   Airway Patency:  Patent      Post Op Vitals Reviewed: Yes      Staff: CRNA, Anesthesiologist         No notable events documented      BP  118/70   Temp   98   Pulse  78   Resp   18   SpO2   99

## 2023-01-09 NOTE — ANESTHESIA PROCEDURE NOTES
Peripheral Block    Patient location during procedure: holding area  Start time: 1/9/2023 12:21 PM  Reason for block: at surgeon's request and post-op pain management  Staffing  Performed: Anesthesiologist   Preanesthetic Checklist  Completed: patient identified, IV checked, site marked, risks and benefits discussed, surgical consent, monitors and equipment checked, pre-op evaluation and timeout performed  Peripheral Block  Patient position: supine  Prep: ChloraPrep  Patient monitoring: continuous pulse ox and frequent blood pressure checks  Block type: adductor canal block  Laterality: left  Injection technique: single-shot  Procedures: ultrasound guided, Ultrasound guidance required for the procedure to increase accuracy and safety of medication placement and decrease risk of complications  Ultrasound permanent image savedbupivacaine (PF) (MARCAINE) 0 25 % 10 mL - Perineural   15 mL - 1/9/2023 12:20:00 PM  Needle  Needle type: Stimuplex   Needle gauge: 20 G  Needle length: 4 in  Needle localization: ultrasound guidance  Needle insertion depth: 8 cm  Test dose: negative  Assessment  Injection assessment: incremental injection, local visualized surrounding nerve on ultrasound, negative aspiration for CSF, negative aspiration for heme and no paresthesia on injection  Paresthesia pain: none  Heart rate change: no  Slow fractionated injection: yes  Post-procedure:  site cleaned and sterile dressing applied  patient tolerated the procedure well with no immediate complications  Additional Notes  0 5% Bupivacaine 15 ml was mixed with Exparel 10 ml for the block

## 2023-01-09 NOTE — PHYSICAL THERAPY NOTE
PT TREATMENT     01/09/23 1601   Note Type   Note Type Treatment   Pain Assessment   Pain Assessment Tool 0-10   Pain Score 3   Pain Location/Orientation   (L knee)   Restrictions/Precautions   LLE Weight Bearing Per Order WBAT   Other Precautions Pain; Fall Risk   General   Chart Reviewed Yes   Cognition   Overall Cognitive Status WFL   Orientation Level Oriented X4   Subjective   Subjective "I'm doing better, now I'm just really sleepy and nauseous"   Bed Mobility   Sit to Supine 5  Supervision   Transfers   Sit to Stand 5  Supervision   Stand to Sit 5  Supervision   Stand pivot 5  Supervision   Additional items   (with walker)   Ambulation/Elevation   Gait pattern   (knee buckling L with fatigue, improved with cues and practice)   Gait Assistance 5  Supervision   Assistive Device Rolling walker   Distance 75 feet   Stair Management Assistance   (supervision/min assist/tactile cues for L knee TKE   Stair Management Technique   (4 steps with 2 rails, 4 steps with a rail and a cane)   Balance   Static Sitting Good   Static Standing Fair   Activity Tolerance   Activity Tolerance   (c/o nausea, RN aware   Exercises   Neuro re-ed x 10 each L knee flexion and extension AAROM   Assessment   Prognosis Good   Problem List Decreased strength;Decreased range of motion; Impaired balance;Decreased mobility;Pain   Assessment Pt's pain is more controlled this session however L knee remains weak when tested in [place]  Pt is able to ambulate with a rolling walker functional distances and negotiate the steps with a rail and a cane  Pt's significant other educated in how to cue pt for TKE/quad set when on the steps  Pt educated in the use of ice, importance of HEP and frequent mobility  Pt is ready to go home from the PT point of view with family support to start OP PT later this week  The patient's AM-PAC Basic Mobility Inpatient Short Form Raw Score is 21   A Raw score of greater than 16 suggests the patient may benefit from discharge to home  Please also refer to the recommendation of the Physical Therapist for safe discharge planning  Plan   Treatment/Interventions Functional transfer training;LE strengthening/ROM; Elevations; Therapeutic exercise;Gait training;Bed mobility; Equipment eval/education;Patient/family training   Progress Progressing toward goals   PT Frequency Twice a day   Recommendation   PT Discharge Recommendation Home with outpatient rehabilitation   Equipment Recommended   (pt issued a walker from CM and a cane from PT)   AM-PAC Basic Mobility Inpatient   Turning in Flat Bed Without Bedrails 4   Lying on Back to Sitting on Edge of Flat Bed Without Bedrails 4   Moving Bed to Chair 3   Standing Up From Chair Using Arms 4   Walk in Room 3   Climb 3-5 Stairs With Railing 3   Basic Mobility Inpatient Raw Score 21   Basic Mobility Standardized Score 45 55   Highest Level Of Mobility   JH-HLM Goal 6: Walk 10 steps or more   JH-HLM Achieved 7: Walk 25 feet or more   Licensure   [place] License Number  [place] PACHECO [id number]

## 2023-01-11 ENCOUNTER — TELEPHONE (OUTPATIENT)
Dept: OBGYN CLINIC | Facility: HOSPITAL | Age: 48
End: 2023-01-11

## 2023-01-11 NOTE — TELEPHONE ENCOUNTER
Pt contacted to complete a postoperative follow up call assessment  She reports doing "Pretty good, and little nausea" recently  She reports current 2/10 pain, ambulating with the RW and doing "a lot better" today  She reports the swelling is "the same" and icing, and that her dressing is dry with no drainage  We reviewed her AVS medication list  She is taking ASA 325mg BID, tylenol 975mg TID, AND  celebrex 100mg BID  She is taking oxycodone for pain, took 10mg this AM, and since has been taking 5mg  She is also taking colace 100mg BID, denies BM yet, and we discussed taking OTC miralax if needed  She denies chest pain, SOB, dizziness, fever or calf pain  She denies questions or issues at this time  pt encouraged to call me with questions, concerns or issues

## 2023-01-12 ENCOUNTER — OFFICE VISIT (OUTPATIENT)
Dept: PHYSICAL THERAPY | Facility: CLINIC | Age: 48
End: 2023-01-12

## 2023-01-12 DIAGNOSIS — G89.29 CHRONIC PAIN OF LEFT KNEE: ICD-10-CM

## 2023-01-12 DIAGNOSIS — M25.562 CHRONIC PAIN OF LEFT KNEE: ICD-10-CM

## 2023-01-12 DIAGNOSIS — M17.12 PRIMARY OSTEOARTHRITIS OF LEFT KNEE: Primary | ICD-10-CM

## 2023-01-12 DIAGNOSIS — Z96.652 PRESENCE OF ARTIFICIAL KNEE JOINT, LEFT: ICD-10-CM

## 2023-01-12 NOTE — PROGRESS NOTES
PT Re-Evaluation     Today's date: 2023  Patient name: Princess Fuentes  : 1975  MRN: [id number]  Referring provider: Edward Avila DO  Dx:   Encounter Diagnosis     ICD-10-CM    1  Primary osteoarthritis of left knee  M17 12       2  Chronic pain of left knee  M25 562     G89 29       3  Presence of artificial knee joint, left  Z96 652                      Assessment  Assessment details: Princess Fuentes is an 50 y o  female  arriving to clinic status post left TKA performed on 2023  Patient's wound not assessed at this time as bandage still donned  Patient arrived at clinic independently ambulating with RW and step to pattern  Limitations in knee extension measured at -6 degrees, and knee flexion measured at 30 degrees  Edema present measured at joint line in operative knee at 45 cm compared to 44 cm in non operative knee  Pain managed with medication and self applied ice  Functionally patient is limited with ADL's, recreational activities, work-related activities, ambulation, stair negotiation, lifting/carrying, transfers  Patient's TUG score measured at 45 indicating patient is at increased risk for falls  Patient lives with boyfriend and children who are available for support  Patient with no reported Hx of falls  Distal LE strength intact  Patient has been educated in post-op contraindications / precautions, home exercise program and plan of care  Patient would benefit from skilled physical therapy services to address their aforementioned functional limitations and progress towards prior level of function and independence with home exercise program     Impairments: abnormal gait, abnormal or restricted ROM, activity intolerance, impaired physical strength, lacks appropriate home exercise program, pain with function, weight-bearing intolerance and poor posture     Symptom irritability: high  Goals  Short term goals: 4 weeks post-op  1   Improve walking tolerance to 15 minutes to perform household activity  2  Patient to be able to negotiate 4 stairs with one handrail and good safety to get into home safely  3  Patient to improve knee extension by 20 degrees to improve quality of gait and reduce risk for falls  4  Patient to reduce pain to 4/10 at its worst to improve activity tolerance    Long term goals: 12 weeks  1  Improve walking tolerance to 45 minutes with least restrictive AD and good safety to return to community level ambulation  2  Patient to improve knee flexion to 130 degrees to improve stair negotiation  3  Patient to improve TUG score to 12 seconds or less to reduce risk for falls  4  Patient to reduce pain to 2/10 at its worst to improve QOL and return to function      Plan  Patient would benefit from: skilled physical therapy  Planned modality interventions: TENS, unattended electrical stimulation, thermotherapy: hydrocollator packs and cryotherapy  Planned therapy interventions: abdominal trunk stabilization, flexibility, functional ROM exercises, home exercise program, therapeutic exercise, therapeutic activities, stretching, strengthening, self care, postural training, patient education, neuromuscular re-education, massage, manual therapy and joint mobilization  Frequency: 3x week  Duration in weeks: 14  Treatment plan discussed with: patient        Subjective Evaluation    History of Present Illness  Mechanism of injury: Patient reports she underwent L TKA on 2023  Patient notes she went home same day with clearance from medical and PT  Patient notes she has been negotiating around her home well with the use of a RW  Patient states pain is well controlled with ice and medications, denies s/s of DVT or infection at this time  Patient notes she has not had a BM yet, medical aware            Recurrent probem    Pain  Current pain ratin  At best pain ratin  At worst pain rating: 10  Quality: discomfort, dull ache, burning, sharp, squeezing and throbbing  Relieving factors: ice, rest, relaxation and medications  Aggravating factors: walking, standing and stair climbing  Progression: improved    Social Support  Steps to enter house: yes  2  Stairs in house: yes   13  Lives in: multiple-level home  Lives with: adult children    Employment status: working    Diagnostic Tests  X-ray: abnormal  MRI studies: abnormal  Treatments  Previous treatment: physical therapy and injection treatment  Patient Goals  Patient goals for therapy: increased strength, return to work, increased motion, decreased pain, decreased edema and return to sport/leisure activities          Objective  AROM:    R  L        Knee flex sup   130  30  Knee ext Qset   -2  -6    MMT:    R  L       Knee flex  /5  3/5  Knee exten  /5  3-/5  Hip flexion  /  4/5  Hip ER   /  4/5  Hip IR     4/5  Hip exten    NT/5  Ankle DF    5  Ankle PF    45      Balance:     Tandem R post w/ EO: 15 sec  Tandem L post  w/ EO:  Unable  SLS R w/ EO:  10 sec  SLS L w/ EO:  Unable  FT w/ EO:  NT  FT w/ EC:   NT      Function: Limited in squatting, kneeling, walking, stairs, standing, transfers  stairs are reciprocal no  standing tolerance estimated at 15 minutes  sleep limitation due to pain Yes  Squatting Unable    Gait: Slight antalgic pattern    TU seconds  Patellar mobility: Fair (+) compression     Edema/circumference:    Tibial tuberosity R:  44 cm  Tibiail tuberosisty L:  45 cm  Superior patellar border R: 49 5 cm  Superior patellar border L:  51 cm    Homen's sign: (-)    Flexibility: Impaired flexibility of left gastroc/soleus, quadriceps       Vitals:  BP: 150/114  HR: 111       Precautions:   Past Medical History:   Diagnosis Date   • Arthritis    • Asthma    • Contact lens/glasses fitting    • Family history of colon cancer     hep C-liver cancer- to colon cancer-father   • GERD (gastroesophageal reflux disease)    • Obesity (BMI 30 0-34  9)            DOS: 2023  Daily Treatment Log:  Date 2023 Visit # 1 pre-op       Manual                        There Exer         qset reviewed       Heel slides reviewed       LAQ reviewed               B/L HR/TR reviewed               Pt educ post-op precautions, WB status, edema mgt, DVT & infection prevention reviewed       HEP Per post-op joint booklet       There Activ        Stair training w/ rail & folded RW reviewed       Car transfers reviewed       Curb training reviewed       Review virtual home assessment reviewed                       NMReed                                                Modalities                                   Access Code: [email or web address]  URL: [email or web address]  Date: 01/12/2023  Prepared by: Randal Knox    Exercises  Supine Single Leg Ankle Pumps - 1 x daily - 7 x weekly - 3 sets - 10 reps  Long Sitting Calf Stretch with Strap - 1 x daily - 7 x weekly - 5 sets - 20s hold  Long Sitting Quad Set - 1 x daily - 7 x weekly - 2 sets - 10 reps - 3s hold  Supine Heel Slide with Strap - 1 x daily - 7 x weekly - 2 sets - 5 reps - 5s hold  Seated Long Arc Quad - 1 x daily - 7 x weekly - 2 sets - 10 reps  Seated Knee Flexion AAROM - 1 x daily - 7 x weekly - 2 sets - 5 reps - 5s hold

## 2023-01-16 ENCOUNTER — OFFICE VISIT (OUTPATIENT)
Dept: PHYSICAL THERAPY | Facility: CLINIC | Age: 48
End: 2023-01-16

## 2023-01-16 DIAGNOSIS — M17.12 PRIMARY OSTEOARTHRITIS OF LEFT KNEE: Primary | ICD-10-CM

## 2023-01-16 DIAGNOSIS — Z96.652 PRESENCE OF ARTIFICIAL KNEE JOINT, LEFT: ICD-10-CM

## 2023-01-16 DIAGNOSIS — G89.29 CHRONIC PAIN OF LEFT KNEE: ICD-10-CM

## 2023-01-16 DIAGNOSIS — M25.562 CHRONIC PAIN OF LEFT KNEE: ICD-10-CM

## 2023-01-16 NOTE — PROGRESS NOTES
Daily Note     Today's date: 2023  Patient name: Jennifer Lopez  : 1975  MRN: [id number]  Referring provider: Fahad Valle DO  Dx:   Encounter Diagnosis     ICD-10-CM    1  Primary osteoarthritis of left knee  M17 12       2  Chronic pain of left knee  M25 562     G89 29       3  Presence of artificial knee joint, left  Z96 652           Start Time: 1400          Subjective: I think I'm bending my knee well  Objective: See treatment diary below      Assessment: Tolerated treatment fair  Patient demonstrated fatigue post treatment, exhibited good technique with therapeutic exercises and would benefit from continued PT  Patient arrives ambulating using RW  Patients daughter present during session  Patient did not tolerate L knee gentle PROM well, crying throughout session  Plan: Progress treatment as tolerated  Precautions:   Past Medical History:   Diagnosis Date   • Arthritis    • Asthma    • Contact lens/glasses fitting    • Family history of colon cancer     hep C-liver cancer- to colon cancer-father   • GERD (gastroesophageal reflux disease)    • Obesity (BMI 30 0-34  9)            DOS: 2023  Daily Treatment Log:  Date 2023     Visit # 1 pre-op  2 post op     Manual   L knee as tolerated         Gentle patellar mobs     L knee ROM    -5 -> 30 degrees (supine)      There Exer         qset reviewed  20 x 5 sec hold (towel under ankle)     Heel slides reviewed  Red PB & SOS x 10 reps      LAQ reviewed  Unable              B/L HR/TR reviewed  Standing HR x 10 reps         NuStep 10 min (ROM only)     Pt educ post-op precautions, WB status, edema mgt, DVT & infection prevention reviewed       HEP Per post-op joint booklet       There Activ        Stair training w/ rail & folded RW reviewed       Car transfers reviewed       Curb training reviewed       Review virtual home assessment reviewed                       NMReed Modalities           CP x 10 min (supine)                         Access Code: [email or web address]  URL: [email or web address]  Date: 01/12/2023  Prepared by: Wilver Ayala    Exercises  Supine Single Leg Ankle Pumps - 1 x daily - 7 x weekly - 3 sets - 10 reps  Long Sitting Calf Stretch with Strap - 1 x daily - 7 x weekly - 5 sets - 20s hold  Long Sitting Quad Set - 1 x daily - 7 x weekly - 2 sets - 10 reps - 3s hold  Supine Heel Slide with Strap - 1 x daily - 7 x weekly - 2 sets - 5 reps - 5s hold  Seated Long Arc Quad - 1 x daily - 7 x weekly - 2 sets - 10 reps  Seated Knee Flexion AAROM - 1 x daily - 7 x weekly - 2 sets - 5 reps - 5s hold

## 2023-01-18 ENCOUNTER — OFFICE VISIT (OUTPATIENT)
Dept: PHYSICAL THERAPY | Facility: CLINIC | Age: 48
End: 2023-01-18

## 2023-01-18 DIAGNOSIS — Z96.652 PRESENCE OF ARTIFICIAL KNEE JOINT, LEFT: ICD-10-CM

## 2023-01-18 DIAGNOSIS — M25.562 CHRONIC PAIN OF LEFT KNEE: ICD-10-CM

## 2023-01-18 DIAGNOSIS — G89.29 CHRONIC PAIN OF LEFT KNEE: ICD-10-CM

## 2023-01-18 DIAGNOSIS — M17.12 PRIMARY OSTEOARTHRITIS OF LEFT KNEE: Primary | ICD-10-CM

## 2023-01-18 NOTE — PROGRESS NOTES
Daily Note     Today's date: 2023  Patient name: Kirill Espinal  : 1975  MRN: [id number]  Referring provider: Collins To DO  Dx:   Encounter Diagnosis     ICD-10-CM    1  Primary osteoarthritis of left knee  M17 12       2  Chronic pain of left knee  M25 562     G89 29       3  Presence of artificial knee joint, left  Z96 652           Start Time: 1330          Subjective: I've been stretching & icing at home, plus I took a pain pill an hour ago  Objective: See treatment diary below      Assessment: Tolerated treatment fair  Patient demonstrated fatigue post treatment and would benefit from continued PT  Patient arrives ambulating using RW  Patients daughter present during session  Plan: Progress treatment as tolerated  Precautions:   Past Medical History:   Diagnosis Date   • Arthritis    • Asthma    • Contact lens/glasses fitting    • Family history of colon cancer     hep C-liver cancer- to colon cancer-father   • GERD (gastroesophageal reflux disease)    • Obesity (BMI 30 0-34  9)            DOS: 2023  Daily Treatment Log:  Date 2023    Visit # 1 pre-op  2 post op 3    Manual   L knee as tolerated  L knee as tolerated       Gentle patellar mobs Gentle patellar mobs    L knee ROM    -5 -> 30 degrees (supine)      There Exer    Prone hangs 1 minute x 2 reps      qset reviewed  20 x 5 sec hold (towel under ankle) 20 x 5 sec hold (towel under ankle)    Heel slides reviewed  Red PB & SOS x 10 reps  10x w/ red PB; 10 x w/ SOS /pillow case on mat     LAQ reviewed  Unable  Unable             B/L HR/TR reviewed  Standing HR x 10 reps  Standing HR x 10 reps        NuStep 10 min (ROM only) NuStep 10 min (ROM only)    Pt educ post-op precautions, WB status, edema mgt, DVT & infection prevention reviewed   2" step ups/lateral x 10 reps each @rail    HEP Per post-op joint booklet       There Activ        Stair training w/ rail & folded RW reviewed       Car transfers reviewed       Curb training reviewed       Review virtual home assessment reviewed                   STS 24" mat 2x10 reps     NMReed                                            GT using SPC around facility    Modalities           CP x 10 min (supine)  CP x 10 min (supine)                        Access Code: [email or web address]  URL: [email or web address]  Date: 01/12/2023  Prepared by: Mary Lanza    Exercises  Supine Single Leg Ankle Pumps - 1 x daily - 7 x weekly - 3 sets - 10 reps  Long Sitting Calf Stretch with Strap - 1 x daily - 7 x weekly - 5 sets - 20s hold  Long Sitting Quad Set - 1 x daily - 7 x weekly - 2 sets - 10 reps - 3s hold  Supine Heel Slide with Strap - 1 x daily - 7 x weekly - 2 sets - 5 reps - 5s hold  Seated Long Arc Quad - 1 x daily - 7 x weekly - 2 sets - 10 reps  Seated Knee Flexion AAROM - 1 x daily - 7 x weekly - 2 sets - 5 reps - 5s hold

## 2023-01-19 ENCOUNTER — OFFICE VISIT (OUTPATIENT)
Dept: PHYSICAL THERAPY | Facility: CLINIC | Age: 48
End: 2023-01-19

## 2023-01-19 DIAGNOSIS — M17.12 PRIMARY OSTEOARTHRITIS OF LEFT KNEE: Primary | ICD-10-CM

## 2023-01-19 DIAGNOSIS — Z96.652 PRESENCE OF ARTIFICIAL KNEE JOINT, LEFT: ICD-10-CM

## 2023-01-19 DIAGNOSIS — G89.29 CHRONIC PAIN OF LEFT KNEE: ICD-10-CM

## 2023-01-19 DIAGNOSIS — M25.562 CHRONIC PAIN OF LEFT KNEE: ICD-10-CM

## 2023-01-19 NOTE — PROGRESS NOTES
Daily Note     Today's date: 2023  Patient name: Sri Mayen  : 1975  MRN: [id number]  Referring provider: Renzo Mcbride DO  Dx:   Encounter Diagnosis     ICD-10-CM    1  Primary osteoarthritis of left knee  M17 12       2  Chronic pain of left knee  M25 562     G89 29       3  Presence of artificial knee joint, left  Z96 652                      Subjective: Patient reports that she feels very sore and "tight" in her knee today  Patient states she has continued to stretch into both extension and flexion at home  Patient notes that symptoms of pain have been more manageable      Objective: See treatment diary below      Assessment: Tolerated treatment fair  Patient with increased pain during session, however was able to achieve 60 degrees knee flexion  Patient demonstrated fatigue post treatment and would benefit from continued PT  Patient arrives ambulating using RW  Patients daughter present during session  Plan: Progress treatment as tolerated  Precautions:   Past Medical History:   Diagnosis Date   • Arthritis    • Asthma    • Contact lens/glasses fitting    • Family history of colon cancer     hep C-liver cancer- to colon cancer-father   • GERD (gastroesophageal reflux disease)    • Obesity (BMI 30 0-34  9)            DOS: 2023  Daily Treatment Log:  Date 2023   Visit # 1 pre-op  2 post op 3    Manual   L knee as tolerated  L knee as tolerated L knee as tolerated      Gentle patellar mobs Gentle patellar mobs Gentle patellar mobs   L knee ROM    -5 -> 30 degrees (supine)   -5 - 60   There Exer    Prone hangs 1 minute x 2 reps  Prone hangs 1 minute x 2 reps     qset reviewed  20 x 5 sec hold (towel under ankle) 20 x 5 sec hold (towel under ankle) 20 x 5 sec hold (towel under ankle)   Heel slides reviewed  Red PB & SOS x 10 reps  10x w/ red PB; 10 x w/ SOS /pillow case on mat  10x w/ red PB; 10 x w/ SOS /pillow case on mat    LAQ reviewed  Unable  Unable B/L HR/TR reviewed  Standing HR x 10 reps  Standing HR x 10 reps  Standing HR x 10 reps       NuStep 10 min (ROM only) NuStep 10 min (ROM only) NuStep 5 min   Pt educ post-op precautions, WB status, edema mgt, DVT & infection prevention reviewed   2" step ups/lateral x 10 reps each @rail 2" step ups/lateral x 10 reps each @rail   HEP Per post-op joint booklet       There Activ        Stair training w/ rail & folded RW reviewed       Car transfers reviewed       Curb training reviewed       Review virtual home assessment reviewed                   STS 24" mat 2x10 reps  STS 24" mat 2x10 reps    NMReed                                            GT using SPC around facility    Modalities           CP x 10 min (supine)  CP x 10 min (supine)                        Access Code: [email or web address]  URL: [email or web address]  Date: 01/12/2023  Prepared by: Russell Estrada    Exercises  Supine Single Leg Ankle Pumps - 1 x daily - 7 x weekly - 3 sets - 10 reps  Long Sitting Calf Stretch with Strap - 1 x daily - 7 x weekly - 5 sets - 20s hold  Long Sitting Quad Set - 1 x daily - 7 x weekly - 2 sets - 10 reps - 3s hold  Supine Heel Slide with Strap - 1 x daily - 7 x weekly - 2 sets - 5 reps - 5s hold  Seated Long Arc Quad - 1 x daily - 7 x weekly - 2 sets - 10 reps  Seated Knee Flexion AAROM - 1 x daily - 7 x weekly - 2 sets - 5 reps - 5s hold

## 2023-01-23 ENCOUNTER — OFFICE VISIT (OUTPATIENT)
Dept: PHYSICAL THERAPY | Facility: CLINIC | Age: 48
End: 2023-01-23

## 2023-01-23 DIAGNOSIS — G89.29 CHRONIC PAIN OF LEFT KNEE: ICD-10-CM

## 2023-01-23 DIAGNOSIS — Z96.652 PRESENCE OF ARTIFICIAL KNEE JOINT, LEFT: ICD-10-CM

## 2023-01-23 DIAGNOSIS — M17.12 PRIMARY OSTEOARTHRITIS OF LEFT KNEE: Primary | ICD-10-CM

## 2023-01-23 DIAGNOSIS — M25.562 CHRONIC PAIN OF LEFT KNEE: ICD-10-CM

## 2023-01-23 NOTE — PROGRESS NOTES
Daily Note     Today's date: 2023  Patient name: Tomas Moeller  : 1975  MRN: [id number]  Referring provider: Judeen Shone, DO  Dx:   Encounter Diagnosis     ICD-10-CM    1  Primary osteoarthritis of left knee  M17 12       2  Chronic pain of left knee  M25 562     G89 29       3  Presence of artificial knee joint, left  Z96 652                      Subjective: Patient reports she did a lot over the weekend including cleaning her home  Patient notes she has been working on her ROM while at home consistently      Objective: See treatment diary below      Assessment: Tolerated treatment fair  Patient's ROM continues to exhibit improvements  Patient demonstrated fatigue post treatment and would benefit from continued PT  Patient arrives ambulating using RW  Patients daughter present during session  Plan: Progress treatment as tolerated  Precautions:   Past Medical History:   Diagnosis Date   • Arthritis    • Asthma    • Contact lens/glasses fitting    • Family history of colon cancer     hep C-liver cancer- to colon cancer-father   • GERD (gastroesophageal reflux disease)    • Obesity (BMI 30 0-34  9)            DOS: 2023  Daily Treatment Log:  Date 2023   Visit # 5  2 post op 3 4   Manual   L knee as tolerated  L knee as tolerated L knee as tolerated      Gentle patellar mobs Gentle patellar mobs Gentle patellar mobs   L knee ROM    -5 -> 30 degrees (supine)   -5 - 60   There Exer    Prone hangs 1 minute x 2 reps  Prone hangs 1 minute x 2 reps    Bridge On PB 20x       LAQ 20x seated        qset   20 x 5 sec hold (towel under ankle) 20 x 5 sec hold (towel under ankle) 20 x 5 sec hold (towel under ankle)   Heel slides 10x w/ red PB; 10 x w/ SOS /pillow case on mat   Red PB & SOS x 10 reps  10x w/ red PB; 10 x w/ SOS /pillow case on mat  10x w/ red PB; 10 x w/ SOS /pillow case on mat    LAQ 20x  Unable  Unable     SLR 10x attempts q/ quad sets       B/L HR/TR Standing 20x  Standing HR x 10 reps  Standing HR x 10 reps  Standing HR x 10 reps    JOSE 10x       Mini squats 15x       NuStep NuStep 10 min (ROM only)  NuStep 10 min (ROM only) NuStep 10 min (ROM only) NuStep 5 min   Pt educ post-op precautions, WB status, edema mgt, DVT & infection prevention    2" step ups/lateral x 10 reps each @rail 2" step ups/lateral x 10 reps each @rail   HEP        There Activ        Stair training w/ rail & folded RW        Car transfers        Curb training        Review virtual home assessment                STS STS 22" mat 2x10 reps    STS 24" mat 2x10 reps  STS 24" mat 2x10 reps    NMReed                                            GT using SPC around facility    Modalities           CP x 10 min (supine)  CP x 10 min (supine)                        Access Code: [email or web address]  URL: [email or web address]  Date: 01/12/2023  Prepared by: Rick Racer    Exercises  Supine Single Leg Ankle Pumps - 1 x daily - 7 x weekly - 3 sets - 10 reps  Long Sitting Calf Stretch with Strap - 1 x daily - 7 x weekly - 5 sets - 20s hold  Long Sitting Quad Set - 1 x daily - 7 x weekly - 2 sets - 10 reps - 3s hold  Supine Heel Slide with Strap - 1 x daily - 7 x weekly - 2 sets - 5 reps - 5s hold  Seated Long Arc Quad - 1 x daily - 7 x weekly - 2 sets - 10 reps  Seated Knee Flexion AAROM - 1 x daily - 7 x weekly - 2 sets - 5 reps - 5s hold

## 2023-01-25 ENCOUNTER — APPOINTMENT (OUTPATIENT)
Dept: RADIOLOGY | Facility: CLINIC | Age: 48
End: 2023-01-25

## 2023-01-25 ENCOUNTER — OFFICE VISIT (OUTPATIENT)
Dept: OBGYN CLINIC | Facility: CLINIC | Age: 48
End: 2023-01-25

## 2023-01-25 ENCOUNTER — OFFICE VISIT (OUTPATIENT)
Dept: PHYSICAL THERAPY | Facility: CLINIC | Age: 48
End: 2023-01-25

## 2023-01-25 VITALS
HEART RATE: 100 BPM | BODY MASS INDEX: 38.99 KG/M2 | DIASTOLIC BLOOD PRESSURE: 88 MMHG | WEIGHT: 234 LBS | HEIGHT: 65 IN | SYSTOLIC BLOOD PRESSURE: 148 MMHG

## 2023-01-25 DIAGNOSIS — Z96.652 PRESENCE OF ARTIFICIAL KNEE JOINT, LEFT: ICD-10-CM

## 2023-01-25 DIAGNOSIS — Z96.652 STATUS POST TOTAL KNEE REPLACEMENT NOT USING CEMENT, LEFT: Primary | ICD-10-CM

## 2023-01-25 DIAGNOSIS — M25.562 CHRONIC PAIN OF LEFT KNEE: ICD-10-CM

## 2023-01-25 DIAGNOSIS — Z96.652 AFTERCARE FOLLOWING LEFT KNEE JOINT REPLACEMENT SURGERY: ICD-10-CM

## 2023-01-25 DIAGNOSIS — Z96.652 STATUS POST TOTAL KNEE REPLACEMENT NOT USING CEMENT, LEFT: ICD-10-CM

## 2023-01-25 DIAGNOSIS — M17.12 PRIMARY OSTEOARTHRITIS OF LEFT KNEE: Primary | ICD-10-CM

## 2023-01-25 DIAGNOSIS — Z47.1 AFTERCARE FOLLOWING LEFT KNEE JOINT REPLACEMENT SURGERY: ICD-10-CM

## 2023-01-25 DIAGNOSIS — G89.29 CHRONIC PAIN OF LEFT KNEE: ICD-10-CM

## 2023-01-25 RX ORDER — OXYCODONE HYDROCHLORIDE 5 MG/1
TABLET ORAL
Qty: 60 TABLET | Refills: 0 | Status: SHIPPED | OUTPATIENT
Start: 2023-01-25

## 2023-01-25 NOTE — PROGRESS NOTES
Assessment/Plan:  1  Status post total knee replacement not using cement, left  XR knee 3 vw left non injury    oxyCODONE (Roxicodone) 5 immediate release tablet      2  Aftercare following left knee joint replacement surgery          Scribe Attestation    I,:  Tabby Pichardo am acting as a scribe while in the presence of the attending physician :       I,:  Marlys Simmons, DO personally performed the services described in this documentation    as scribed in my presence :           Jessy Sarmiento is a pleasant 50 y o  presenting today for follow-up 2 weeks after robotic assisted left total knee arthroplasty  Her incision is healing well, no signs of infection  She may get wet in the shower, pat dry do not scrub, do not soak  Prosthesis is stable on exam and review of imaging  On clinical examination today patient is only able to get to 60 degrees of flexion, we discussed the importance of focusing on increasing her flexion  HEP provided for days she does not have physical therapy  Continue physical therapy 3x a week, transitioning away from cane and focusing on range of motion  Continue taking aspirin for DVT prophylaxis for 4 more weeks  We discussed use of Meloxicam, tylenol, and oxycodone for pain management  She will follow up in 2 weeks for a range of motion check  Subjective: 2 week status post left total knee replacement    Patient ID: Taj Dotson is a 50 y o  female to the office 2 weeks status post left total knee arthroplasty  Patient overall is doing very well  She presents today with a single-point cane for ambulation assistance  She has started physical therapy  She notes a generalized 'soreness' through out her knee  She notes knee flexion is the hardest for her at physical therapy and through out the day  Review of Systems   Constitutional: Negative for chills and fever  HENT: Negative for ear pain and sore throat  Eyes: Negative for pain and visual disturbance     Respiratory: Negative for cough and shortness of breath  Cardiovascular: Negative for chest pain and palpitations  Gastrointestinal: Negative for abdominal pain and vomiting  Genitourinary: Negative for dysuria and hematuria  Musculoskeletal: Negative for arthralgias and back pain  Skin: Negative for color change and rash  Neurological: Negative for seizures and syncope  All other systems reviewed and are negative  Past Medical History:   Diagnosis Date   • Arthritis    • Asthma    • Contact lens/glasses fitting    • Family history of colon cancer     hep C-liver cancer- to colon cancer-father   • GERD (gastroesophageal reflux disease)    • Obesity (BMI 30 0-34  9)        Past Surgical History:   Procedure Laterality Date   •  SECTION      ,    • DILATION AND CURETTAGE OF UTERUS      miscarriage   • EGD     • MO ARTHRP KNE CONDYLE&PLATU MEDIAL&LAT COMPARTMENTS Left 2023    Procedure: ARTHROPLASTY KNEE TOTAL W ROBOT - SAME DAY - PRESS FIT - LEFT;  Surgeon: Marlys Simmons DO;  Location: [address] [place] [address];  Service: Orthopedics   • ROTATOR CUFF REPAIR Right    • SHOULDER SURGERY     • TONSILLECTOMY     • TUBAL LIGATION         Family History   Problem Relation Age of Onset   • Heart disease Mother         pacemaker   • Coronary artery disease Mother    • Hypertension Mother         pulmonary   • Hearing loss Mother    • Colon cancer Father            • Liver cancer Father    • Hepatitis Father    • Cancer Father          - colon cancer       Social History     Occupational History   • Not on file   Tobacco Use   • Smoking status: Never   • Smokeless tobacco: Never   Vaping Use   • Vaping Use: Never used   Substance and Sexual Activity   • Alcohol use:  Yes     Alcohol/week: 2 0 standard drinks     Types: 2 Standard drinks or equivalent per week     Comment: social   • Drug use: Never   • Sexual activity: Yes     Partners: Male     Birth control/protection: OCP         Current Outpatient Medications:   •  oxyCODONE (Roxicodone) 5 immediate release tablet, Take 1-2 tablets by mouth every 4-6 hours as needed for pain, Disp: 60 tablet, Rfl: 0  •  acetaminophen (TYLENOL) 325 mg tablet, Take 3 tablets (975 mg total) by mouth every 8 (eight) hours, Disp: , Rfl: 0  •  aspirin 325 mg tablet, Take 1 tablet (325 mg total) by mouth 2 (two) times a day for 84 doses, Disp: 84 tablet, Rfl: 0  •  cetirizine (ZyrTEC) 10 mg tablet, Take 10 mg by mouth daily, Disp: , Rfl:   •  docusate sodium (COLACE) 100 mg capsule, Take 1 capsule (100 mg total) by mouth 2 (two) times a day, Disp: 60 capsule, Rfl: 0  •  famotidine (PEPCID) 20 mg tablet, Take 1 tablet (20 mg total) by mouth 2 (two) times a day (Patient taking differently: Take 20 mg by mouth daily), Disp: 60 tablet, Rfl: 11  •  fluticasone-vilanterol (Breo Ellipta) 100-25 mcg/inh inhaler, Inhale 1 puff daily Rinse mouth after use , Disp: 60 blister, Rfl: 3  •  lansoprazole (PREVACID) 30 mg capsule, as needed  , Disp: , Rfl:   •  Levonorgest-Eth Estrad 91-Day 0 15-0 03 &0 01 MG TABS, Take 1 tablet by mouth every morning  , Disp: , Rfl: 2  •  montelukast (SINGULAIR) 10 mg tablet, Take 10 mg by mouth daily, Disp: , Rfl:   •  pimecrolimus (ELIDEL) 1 % cream, MIX WITH KETOCONAZOLE CREAM BEFORE APPLYING TO FACE TWICE DAILY, Disp: , Rfl:     No Known Allergies    Objective:  Vitals:    01/25/23 1332   BP: 148/88   Pulse: 100       Body mass index is 38 94 kg/m²  Left Knee Exam     Range of Motion   Extension: -5   Flexion: 60     Tests   Varus: negative Valgus: negative    Other   Erythema: absent  Sensation: normal  Pulse: present  Swelling: mild    Comments:  Anterior incision healing well without erythema, warmth, drainage, or dehiscence     No sign of infection  Prosthesis stable to stressing at 5, and 60 degrees  Thigh and calf soft and nontender  Ambulates with a slightly antalgic gait and a cane  Grossly distally neurovascularly intact              Physical Exam  Vitals and nursing note reviewed  Constitutional:       Appearance: Normal appearance  HENT:      Head: Normocephalic  Right Ear: External ear normal       Left Ear: External ear normal    Eyes:      Extraocular Movements: Extraocular movements intact  Conjunctiva/sclera: Conjunctivae normal    Cardiovascular:      Rate and Rhythm: Normal rate  Pulses: Normal pulses  Pulmonary:      Effort: Pulmonary effort is normal    Musculoskeletal:         General: Normal range of motion  Cervical back: Normal range of motion  Skin:     General: Skin is warm and dry  Neurological:      General: No focal deficit present  Mental Status: She is alert  Psychiatric:         Behavior: Behavior normal          I have personally reviewed pertinent films in PACS  X-rays of the left knee demonstrate a well-aligned, well positioned uncemented total knee arthroplasty with no evidence of loosening, periprosthetic fracture, or other interval complication  This document was created using speech voice recognition software  Grammatical errors, random word insertions, pronoun errors, and incomplete sentences are an occasional consequence of this system due to software limitations, ambient noise, and hardware issues  Any formal questions or concerns about content, text, or information contained within the body of this dictation should be directly addressed to the provider for clarification

## 2023-01-25 NOTE — PROGRESS NOTES
Daily Note     Today's date: 2023  Patient name: Kiana Kay  : 1975  MRN: [id number]  Referring provider: Imelda Delarosa DO  Dx:   Encounter Diagnosis     ICD-10-CM    1  Primary osteoarthritis of left knee  M17 12       2  Chronic pain of left knee  M25 562     G89 29       3  Presence of artificial knee joint, left  Z96 652           Start Time: 1439          Subjective: I just saw the surgeon & he wants me to bend my knee more  Objective: See treatment diary below      Assessment: Tolerated treatment fair  Patient demonstrated fatigue post treatment and would benefit from continued PT  Patient arrives ambulating using SPC  Patients boyfriend present during session  Plan: Progress treatment as tolerated  Precautions:   Past Medical History:   Diagnosis Date   • Arthritis    • Asthma    • Contact lens/glasses fitting    • Family history of colon cancer     hep C-liver cancer- to colon cancer-father   • GERD (gastroesophageal reflux disease)    • Obesity (BMI 30 0-34  9)            DOS: 2023  Daily Treatment Log:  Date 2023   Visit # 5 6 2 post op 3 4   Manual  L knee PROM/stretching as tolerated L knee as tolerated  L knee as tolerated L knee as tolerated     Gentle patellar mobs Gentle patellar mobs Gentle patellar mobs Gentle patellar mobs   L knee ROM   -5 -> 60 degrees (supine) -5 -> 30 degrees (supine)   -5 - 60   There Exer    Prone hangs 1 minute x 2 reps  Prone hangs 1 minute x 2 reps    Bridge On PB 20x 20x on red PB      LAQ 20x seated        qset  hep 20 x 5 sec hold (towel under ankle) 20 x 5 sec hold (towel under ankle) 20 x 5 sec hold (towel under ankle)   Heel slides 10x w/ red PB; 10 x w/ SOS /pillow case on mat  20x w/ red PB & SOS Red PB & SOS x 10 reps  10x w/ red PB; 10 x w/ SOS /pillow case on mat  10x w/ red PB; 10 x w/ SOS /pillow case on mat    LAQ 20x 20x Unable  Unable     SLR 10x attempts q/ quad sets 2x10 reps AAROM      B/L HR/TR Standing 20x 20x standing Standing HR x 10 reps  Standing HR x 10 reps  Standing HR x 10 reps    JOSE 10x ----      Mini squats 15x 10x      NuStep NuStep 10 min (ROM only) NuStep 10 min (ROM only) NuStep 10 min (ROM only) NuStep 10 min (ROM only) NuStep 5 min   Pt educ post-op precautions, WB status, edema mgt, DVT & infection prevention    2" step ups/lateral x 10 reps each @rail 2" step ups/lateral x 10 reps each @rail   HEP        There Activ        Stair training w/ rail & folded RW        Car transfers        Curb training        Review virtual home assessment                STS STS 22" mat 2x10 reps  STS 22" mat 2x10 reps   STS 24" mat 2x10 reps  STS 24" mat 2x10 reps    NMReed                                            GT using SPC around facility    Modalities          CP x 10 min (supine) CP x 10 min (supine)  CP x 10 min (supine)                        Access Code: [email or web address]  URL: [email or web address]  Date: 01/12/2023  Prepared by: Nirali Armstrong    Exercises  Supine Single Leg Ankle Pumps - 1 x daily - 7 x weekly - 3 sets - 10 reps  Long Sitting Calf Stretch with Strap - 1 x daily - 7 x weekly - 5 sets - 20s hold  Long Sitting Quad Set - 1 x daily - 7 x weekly - 2 sets - 10 reps - 3s hold  Supine Heel Slide with Strap - 1 x daily - 7 x weekly - 2 sets - 5 reps - 5s hold  Seated Long Arc Quad - 1 x daily - 7 x weekly - 2 sets - 10 reps  Seated Knee Flexion AAROM - 1 x daily - 7 x weekly - 2 sets - 5 reps - 5s hold

## 2023-01-26 ENCOUNTER — OFFICE VISIT (OUTPATIENT)
Dept: PHYSICAL THERAPY | Facility: CLINIC | Age: 48
End: 2023-01-26

## 2023-01-26 DIAGNOSIS — M17.12 PRIMARY OSTEOARTHRITIS OF LEFT KNEE: Primary | ICD-10-CM

## 2023-01-26 DIAGNOSIS — Z96.652 PRESENCE OF ARTIFICIAL KNEE JOINT, LEFT: ICD-10-CM

## 2023-01-26 DIAGNOSIS — G89.29 CHRONIC PAIN OF LEFT KNEE: ICD-10-CM

## 2023-01-26 DIAGNOSIS — M25.562 CHRONIC PAIN OF LEFT KNEE: ICD-10-CM

## 2023-01-26 NOTE — PROGRESS NOTES
Daily Note     Today's date: 2023  Patient name: Ramez Azul  : 1975  MRN: [id number]  Referring provider: Aram Doan DO  Dx:   Encounter Diagnosis     ICD-10-CM    1  Primary osteoarthritis of left knee  M17 12       2  Chronic pain of left knee  M25 562     G89 29       3  Presence of artificial knee joint, left  Z96 652           Start Time: 1105          Subjective: My knee feels better everyday  Objective: See treatment diary below    NuStep performed to increase L knee ROM  R UE Vitals @ end of NuStep (physiologic stress exposure)  /87  HR 77     Assessment: Tolerated treatment fair  Patient demonstrated fatigue post treatment and would benefit from continued PT  Patient arrives ambulating using [facility]  Patients daughter present during session  Plan: Progress treatment as tolerated  Precautions:   Past Medical History:   Diagnosis Date   • Arthritis    • Asthma    • Contact lens/glasses fitting    • Family history of colon cancer     hep C-liver cancer- to colon cancer-father   • GERD (gastroesophageal reflux disease)    • Obesity (BMI 30 0-34  9)            DOS: 2023  Daily Treatment Log:  Date 2023   Visit # 5 6 7 3 4   Manual  L knee PROM/stretching as tolerated L knee PROM/stretching as tolerated L knee as tolerated L knee as tolerated     Gentle patellar mobs Gentle patellar mobs Gentle patellar mobs Gentle patellar mobs   L knee ROM   -5 -> 60 degrees (supine)   -5 - 60   There Exer   L LE stretching over edge of mat w/MH 1 min x 3 reps  Prone hangs 1 minute x 2 reps  Prone hangs 1 minute x 2 reps    Bridge On PB 20x 20x on red PB 2x10 reps w/ hip add     LAQ 20x seated  ----      qset  hep hep 20 x 5 sec hold (towel under ankle) 20 x 5 sec hold (towel under ankle)   Heel slides 10x w/ red PB; 10 x w/ SOS /pillow case on mat  20x w/ red PB & SOS 20x w/ red PB & SOS 10x w/ red PB; 10 x w/ SOS /pillow case on mat  10x w/ red PB; 10 x w/ SOS /pillow case on mat    LAQ 20x 20x ---- Unable     SLR 10x attempts q/ quad sets 2x10 reps AAROM 10x AAROM     B/L HR/TR Standing 20x 20x standing 20x HR standing Standing HR x 10 reps  Standing HR x 10 reps    JOSE 10x ---- ---     Mini squats 15x 10x Wall slides 15x      NuStep NuStep 10 min (ROM only) NuStep 10 min (ROM only) NuStep 10 min (ROM only) NuStep 10 min (ROM only) NuStep 5 min   Pt educ post-op precautions, WB status, edema mgt, DVT & infection prevention   4" step ups/lateral x 10 reps each @rail 2" step ups/lateral x 10 reps each @rail 2" step ups/lateral x 10 reps each @rail   HEP        There Activ        Stair training w/ rail & folded RW        Car transfers        Curb training        Review virtual home assessment                STS STS 22" mat 2x10 reps  STS 22" mat 2x10 reps  STS 22" mat 2x10 reps  STS 24" mat 2x10 reps  STS 24" mat 2x10 reps    NMReed                                            GT using SPC around facility    Modalities          CP x 10 min (supine) @home CP x 10 min (supine)                        Access Code: [email or web address]  URL: [email or web address]  Date: 01/12/2023  Prepared by: Sunny Flemings    Exercises  Supine Single Leg Ankle Pumps - 1 x daily - 7 x weekly - 3 sets - 10 reps  Long Sitting Calf Stretch with Strap - 1 x daily - 7 x weekly - 5 sets - 20s hold  Long Sitting Quad Set - 1 x daily - 7 x weekly - 2 sets - 10 reps - 3s hold  Supine Heel Slide with Strap - 1 x daily - 7 x weekly - 2 sets - 5 reps - 5s hold  Seated Long Arc Quad - 1 x daily - 7 x weekly - 2 sets - 10 reps  Seated Knee Flexion AAROM - 1 x daily - 7 x weekly - 2 sets - 5 reps - 5s hold

## 2023-01-30 ENCOUNTER — OFFICE VISIT (OUTPATIENT)
Dept: PHYSICAL THERAPY | Facility: CLINIC | Age: 48
End: 2023-01-30

## 2023-01-30 DIAGNOSIS — M25.562 CHRONIC PAIN OF LEFT KNEE: ICD-10-CM

## 2023-01-30 DIAGNOSIS — M17.12 PRIMARY OSTEOARTHRITIS OF LEFT KNEE: Primary | ICD-10-CM

## 2023-01-30 DIAGNOSIS — G89.29 CHRONIC PAIN OF LEFT KNEE: ICD-10-CM

## 2023-01-30 DIAGNOSIS — Z96.652 PRESENCE OF ARTIFICIAL KNEE JOINT, LEFT: ICD-10-CM

## 2023-01-30 NOTE — PROGRESS NOTES
Daily Note     Today's date: 2023  Patient name: Brigette Courser  : 1975  MRN: [id number]  Referring provider: Rocío Gill DO  Dx:   Encounter Diagnosis     ICD-10-CM    1  Primary osteoarthritis of left knee  M17 12       2  Chronic pain of left knee  M25 562     G89 29       3  Presence of artificial knee joint, left  Z96 652                      Subjective: Patient reports she has been working on ROM consistently at home  Objective: See treatment diary below    NuStep performed to increase L knee ROM  R UE Vitals @ end of NuStep (physiologic stress exposure)  /82  HR 80     Assessment: Tolerated treatment fair  Patient demonstrated fatigue post treatment and would benefit from continued PT  Patient arrives ambulating using [facility]  Patients daughter present during session  Plan: Progress treatment as tolerated  Precautions:   Past Medical History:   Diagnosis Date   • Arthritis    • Asthma    • Contact lens/glasses fitting    • Family history of colon cancer     hep C-liver cancer- to colon cancer-father   • GERD (gastroesophageal reflux disease)    • Obesity (BMI 30 0-34  9)            DOS: 2023  Daily Treatment Log:  Date 2023   Visit # 5 6 7 8 4   Manual  L knee PROM/stretching as tolerated L knee PROM/stretching as tolerated L knee PROM/stretching as tolerated w/ heat L knee as tolerated     Gentle patellar mobs Gentle patellar mobs Gentle patellar mobs Gentle patellar mobs   L knee ROM   -5 -> 60 degrees (supine)  -5 -- 80 degrees -5 - 60   There Exer   L LE stretching over edge of mat w/MH 1 min x 3 reps  Prone hangs 1 minute x 2 reps  Prone hangs 1 minute x 2 reps    Bridge On PB 20x 20x on red PB 2x10 reps w/ hip add 2x10 reps w/ hip add    LAQ 20x seated  ----      qset  hep hep HEP 20 x 5 sec hold (towel under ankle)   Heel slides 10x w/ red PB; 10 x w/ SOS /pillow case on mat  20x w/ red PB & SOS 20x w/ red PB & SOS 20x w/ red PB & SOS for OP 10x w/ red PB; 10 x w/ SOS /pillow case on mat    LAQ 20x 20x ---- 20x    SLR 10x attempts q/ quad sets 2x10 reps AAROM 10x AAROM 10x AAROM    B/L HR/TR Standing 20x 20x standing 20x HR standing 20x HR standing with focus on knee extension Standing HR x 10 reps    JOSE 10x ---- ---     Mini squats 15x 10x Wall slides 15x  Wall slides 15x     NuStep NuStep 10 min (ROM only) NuStep 10 min (ROM only) NuStep 10 min (ROM only) NuStep 10 min (ROM only) NuStep 5 min   Pt educ post-op precautions, WB status, edema mgt, DVT & infection prevention   4" step ups/lateral x 10 reps each @rail 6" step ups/lateral x 10 reps each @rail 2" step ups/lateral x 10 reps each @rail   HEP        There Activ        Stair training w/ rail & folded RW        Car transfers        Curb training        Review virtual home assessment                STS STS 22" mat 2x10 reps  STS 22" mat 2x10 reps  STS 22" mat 2x10 reps  STS 22" mat 2x10 reps  STS 24" mat 2x10 reps    NMReed                                                Modalities          CP x 10 min (supine) @home CP x 10 min (supine) with towel roll under ankle                       Access Code: [email or web address]  URL: [email or web address]  Date: 01/12/2023  Prepared by: Duane Hare    Exercises  Supine Single Leg Ankle Pumps - 1 x daily - 7 x weekly - 3 sets - 10 reps  Long Sitting Calf Stretch with Strap - 1 x daily - 7 x weekly - 5 sets - 20s hold  Long Sitting Quad Set - 1 x daily - 7 x weekly - 2 sets - 10 reps - 3s hold  Supine Heel Slide with Strap - 1 x daily - 7 x weekly - 2 sets - 5 reps - 5s hold  Seated Long Arc Quad - 1 x daily - 7 x weekly - 2 sets - 10 reps  Seated Knee Flexion AAROM - 1 x daily - 7 x weekly - 2 sets - 5 reps - 5s hold

## 2023-02-01 ENCOUNTER — OFFICE VISIT (OUTPATIENT)
Dept: PHYSICAL THERAPY | Facility: CLINIC | Age: 48
End: 2023-02-01

## 2023-02-01 DIAGNOSIS — G89.29 CHRONIC PAIN OF LEFT KNEE: ICD-10-CM

## 2023-02-01 DIAGNOSIS — Z96.652 PRESENCE OF ARTIFICIAL KNEE JOINT, LEFT: ICD-10-CM

## 2023-02-01 DIAGNOSIS — M25.562 CHRONIC PAIN OF LEFT KNEE: ICD-10-CM

## 2023-02-01 DIAGNOSIS — M17.12 PRIMARY OSTEOARTHRITIS OF LEFT KNEE: Primary | ICD-10-CM

## 2023-02-01 NOTE — PROGRESS NOTES
Daily Note     Today's date: 2023  Patient name: Jim Sawyer  : 1975  MRN: [id number]  Referring provider: Ansley Cortez DO  Dx:   Encounter Diagnosis     ICD-10-CM    1  Primary osteoarthritis of left knee  M17 12       2  Chronic pain of left knee  M25 562     G89 29       3  Presence of artificial knee joint, left  Z96 652           Start Time: 1525          Subjective: I just drove an hour  My knee feels tight & swollen  2/10 medial L knee pain  Objective: See treatment diary below      Assessment: Tolerated treatment fair  Patient demonstrated fatigue post treatment and would benefit from continued PT  Patient arrives ambulating using SPC with stiff knee gait  Plan: Progress treatment as tolerated  Precautions:   Past Medical History:   Diagnosis Date   • Arthritis    • Asthma    • Contact lens/glasses fitting    • Family history of colon cancer     hep C-liver cancer- to colon cancer-father   • GERD (gastroesophageal reflux disease)    • Obesity (BMI 30 0-34  9)            DOS: 2023  Daily Treatment Log:  Date 2023    Visit # 5 6 7 9    Manual  L knee PROM/stretching as tolerated L knee PROM/stretching as tolerated L knee PROM/stretching as tolerated       Gentle patellar mobs Gentle patellar mobs Gentle patellar mobs    L knee ROM   -5 -> 60 degrees (supine)  -5 -> 70 degrees (supine)    There Exer   L LE stretching over edge of mat w/MH 1 min x 3 reps  -----     Bridge On PB 20x 20x on red PB 2x10 reps w/ hip add 2x10 reps w/ hip add    LAQ 20x seated  ----      qset  hep hep HEP    Heel slides 10x w/ red PB; 10 x w/ SOS /pillow case on mat  20x w/ red PB & SOS 20x w/ red PB & SOS -----    LAQ 20x 20x ---- hep    SLR 10x attempts q/ quad sets 2x10 reps AAROM 10x AAROM 10x AAROM    B/L HR/TR Standing 20x 20x standing 20x HR standing 20x HR standing with focus on knee extension    JOSE 10x ---- ---     Mini squats 15x 10x Wall slides 15x  Wall slides 20x NuStep NuStep 10 min (ROM only) NuStep 10 min (ROM only) NuStep 10 min (ROM only) NuStep 10 min (ROM only)    Pt educ post-op precautions, WB status, edema mgt, DVT & infection prevention   4" step ups/lateral x 10 reps each @rail 6" step ups/lateral x 15 reps each    HEP    TG L 22 squats w/ MH x 8 min    There Activ        Stair training w/ rail & folded RW        Car transfers        Curb training        Review virtual home assessment                STS STS 22" mat 2x10 reps  STS 22" mat 2x10 reps  STS 22" mat 2x10 reps  STS 22" mat 2x10 reps     NMReed                                                Modalities          CP x 10 min (supine) @home CP x 10 min (supine)                        Access Code: [email or web address]  URL: [email or web address]  Date: 01/12/2023  Prepared by: Shannon Nickerson    Exercises  Supine Single Leg Ankle Pumps - 1 x daily - 7 x weekly - 3 sets - 10 reps  Long Sitting Calf Stretch with Strap - 1 x daily - 7 x weekly - 5 sets - 20s hold  Long Sitting Quad Set - 1 x daily - 7 x weekly - 2 sets - 10 reps - 3s hold  Supine Heel Slide with Strap - 1 x daily - 7 x weekly - 2 sets - 5 reps - 5s hold  Seated Long Arc Quad - 1 x daily - 7 x weekly - 2 sets - 10 reps  Seated Knee Flexion AAROM - 1 x daily - 7 x weekly - 2 sets - 5 reps - 5s hold

## 2023-02-06 ENCOUNTER — TELEPHONE (OUTPATIENT)
Dept: OBGYN CLINIC | Facility: CLINIC | Age: 48
End: 2023-02-06

## 2023-02-06 ENCOUNTER — OFFICE VISIT (OUTPATIENT)
Dept: PHYSICAL THERAPY | Facility: CLINIC | Age: 48
End: 2023-02-06

## 2023-02-06 DIAGNOSIS — G89.29 CHRONIC PAIN OF LEFT KNEE: ICD-10-CM

## 2023-02-06 DIAGNOSIS — Z96.652 PRESENCE OF ARTIFICIAL KNEE JOINT, LEFT: ICD-10-CM

## 2023-02-06 DIAGNOSIS — M17.12 PRIMARY OSTEOARTHRITIS OF LEFT KNEE: Primary | ICD-10-CM

## 2023-02-06 DIAGNOSIS — M25.562 CHRONIC PAIN OF LEFT KNEE: ICD-10-CM

## 2023-02-06 NOTE — TELEPHONE ENCOUNTER
Sw pt and advised of below, she states she understands that but she has no more days left she can go without taking off with no pay    She is a single mother with no back up pay, she would like to discuss this Wednesday at her follow up

## 2023-02-06 NOTE — PROGRESS NOTES
Daily Note     Today's date: 2023  Patient name: Mariposa Lugo  : 1975  MRN: [id number]  Referring provider: Sara Farfan DO  Dx:   Encounter Diagnosis     ICD-10-CM    1  Primary osteoarthritis of left knee  M17 12       2  Chronic pain of left knee  M25 562     G89 29       3  Presence of artificial knee joint, left  Z96 652           Start Time: 1657          Subjective: I went back to work today  Objective: See treatment diary below      Assessment: Tolerated treatment fair  Patient demonstrated fatigue post treatment and would benefit from continued PT  Patient arrives ambulating using [facility]    Plan: Progress treatment as tolerated  Precautions:   Past Medical History:   Diagnosis Date   • Arthritis    • Asthma    • Contact lens/glasses fitting    • Family history of colon cancer     hep C-liver cancer- to colon cancer-father   • GERD (gastroesophageal reflux disease)    • Obesity (BMI 30 0-34  9)            DOS: 2023  Daily Treatment Log:  Date 2023    Visit # 5 6 7 10    Manual  L knee PROM/stretching as tolerated L knee PROM/stretching as tolerated L knee PROM/stretching as tolerated       Gentle patellar mobs Gentle patellar mobs Gentle patellar mobs    L knee ROM   -5 -> 60 degrees (supine)  -5 -> 75 degrees (supine)    There Exer   L LE stretching over edge of mat w/MH 1 min x 3 reps  L LE stretching over edge of mat w/MH 1 min x 3 reps    Bridge On PB 20x 20x on red PB 2x10 reps w/ hip add 2x10 reps w/ hip add    LAQ 20x seated  ---- ----     qset  hep hep HEP    Heel slides 10x w/ red PB; 10 x w/ SOS /pillow case on mat  20x w/ red PB & SOS 20x w/ red PB & SOS 20x w/ red PB & SOS    LAQ 20x 20x ---- hep    SLR 10x attempts q/ quad sets 2x10 reps AAROM 10x AAROM -----    B/L HR/TR Standing 20x 20x standing 20x HR standing 20x HR standing with focus on knee extension    JOSE 10x ---- --- -----    Mini squats 15x 10x Wall slides 15x  Wall slides 20x     NuStep NuStep 10 min (ROM only) NuStep 10 min (ROM only) NuStep 10 min (ROM only) BIKE rocking seat #5 x 8 min    Pt educ post-op precautions, WB status, edema mgt, DVT & infection prevention   4" step ups/lateral x 10 reps each @rail 6" step ups/lateral x 15 reps each    HEP    TG L 15 squats w/ MH x 8 min    There Activ        Stair training w/ rail & folded RW        Car transfers        Curb training        Review virtual home assessment                STS STS 22" mat 2x10 reps  STS 22" mat 2x10 reps  STS 22" mat 2x10 reps  STS 22" mat 2x10 reps     NMReed                                                Modalities          CP x 10 min (supine) @home -----                       Access Code: [email or web address]  URL: [email or web address]  Date: 01/12/2023  Prepared by: Rabia Villeda    Exercises  Supine Single Leg Ankle Pumps - 1 x daily - 7 x weekly - 3 sets - 10 reps  Long Sitting Calf Stretch with Strap - 1 x daily - 7 x weekly - 5 sets - 20s hold  Long Sitting Quad Set - 1 x daily - 7 x weekly - 2 sets - 10 reps - 3s hold  Supine Heel Slide with Strap - 1 x daily - 7 x weekly - 2 sets - 5 reps - 5s hold  Seated Long Arc Quad - 1 x daily - 7 x weekly - 2 sets - 10 reps  Seated Knee Flexion AAROM - 1 x daily - 7 x weekly - 2 sets - 5 reps - 5s hold

## 2023-02-06 NOTE — TELEPHONE ENCOUNTER
Caller: Patient    Doctor/Office: Kiki Smith    #: [phone]    Work or school note: School note    Return to work/school date: Patient requesting a letter stating that she is cleared to return to work today 2/6  Fax #: [phone] Attention: Jyothi Dominguez    Is there any restrictions that need to be updated? If so, what?  No

## 2023-02-08 ENCOUNTER — OFFICE VISIT (OUTPATIENT)
Dept: OBGYN CLINIC | Facility: CLINIC | Age: 48
End: 2023-02-08

## 2023-02-08 VITALS
BODY MASS INDEX: 37.94 KG/M2 | SYSTOLIC BLOOD PRESSURE: 140 MMHG | TEMPERATURE: 98.2 F | WEIGHT: 228 LBS | HEART RATE: 85 BPM | DIASTOLIC BLOOD PRESSURE: 75 MMHG

## 2023-02-08 DIAGNOSIS — Z96.652 AFTERCARE FOLLOWING LEFT KNEE JOINT REPLACEMENT SURGERY: ICD-10-CM

## 2023-02-08 DIAGNOSIS — Z47.1 AFTERCARE FOLLOWING LEFT KNEE JOINT REPLACEMENT SURGERY: ICD-10-CM

## 2023-02-08 DIAGNOSIS — Z96.652 STATUS POST TOTAL KNEE REPLACEMENT NOT USING CEMENT, LEFT: Primary | ICD-10-CM

## 2023-02-08 NOTE — PROGRESS NOTES
Assessment/Plan:  1  Status post total knee replacement not using cement, left        2  Aftercare following left knee joint replacement surgery          Scribe Attestation    I,:  Puneet Stewart PA-C am acting as a scribe while in the presence of the attending physician :       I,:  Juhi Jeffrey,  personally performed the services described in this documentation    as scribed in my presence :         Nigel Rose is a pleasant 78-year-old presenting today for follow-up 4 weeks after a robotic assisted left total knee arthroplasty for a range of motion check  She has been working diligently with physical therapy and her home exercises, and is now achieved a range of motion that is outside the realm of need for manipulation under anesthesia  However, we believe that she still has more range of motion to obtain as she has soft endpoints  We encouraged her to attend physical therapy 3 times a week and continue with her home exercise program   She may continue to use oxycodone as needed around physical therapy  She will continue with aspirin for DVT prophylaxis for the next 2 weeks  We will see her back in 2 weeks for clinical reevaluation  She expressed understanding all of her questions were addressed    Subjective: Range of motion check left knee    Patient ID: Tomas Moeller is a 50 y o  female  Nigel Rose is a pleasant 78-year-old presenting today for follow-up 4 weeks after a robotic assisted left total knee arthroplasty  She is continued working with physical therapy twice a week and performing her home exercise program   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, but has still been able to utilize her break periods to do her home exercise program   She does not have a tremendous amount of pain in the knee, and does feel that she is made gains in her motion  She is continued with aspirin for DVT prophylaxis  She does take oxycodone only before therapy    She is still ambulating with a cane      Review of Systems   Constitutional: Positive for activity change  HENT: Negative  Eyes: Negative  Respiratory: Negative  Cardiovascular: Negative  Gastrointestinal: Negative  Endocrine: Negative  Genitourinary: Negative  Musculoskeletal: Positive for arthralgias, gait problem, joint swelling and myalgias  Skin: Negative  Allergic/Immunologic: Negative  Hematological: Negative  Psychiatric/Behavioral: Negative  Past Medical History:   Diagnosis Date   • Arthritis    • Asthma    • Contact lens/glasses fitting    • Family history of colon cancer     hep C-liver cancer- to colon cancer-father   • GERD (gastroesophageal reflux disease)    • Obesity (BMI 30 0-34  9)        Past Surgical History:   Procedure Laterality Date   •  SECTION      ,    • DILATION AND CURETTAGE OF UTERUS      miscarriage   • EGD     • ID ARTHRP KNE CONDYLE&PLATU MEDIAL&LAT COMPARTMENTS Left 2023    Procedure: ARTHROPLASTY KNEE TOTAL W ROBOT - SAME DAY - PRESS FIT - LEFT;  Surgeon: Bruce Wells DO;  Location: [address] [place] [address];  Service: Orthopedics   • ROTATOR CUFF REPAIR Right    • SHOULDER SURGERY     • TONSILLECTOMY     • TUBAL LIGATION         Family History   Problem Relation Age of Onset   • Heart disease Mother         pacemaker   • Coronary artery disease Mother    • Hypertension Mother         pulmonary   • Hearing loss Mother    • Colon cancer Father            • Liver cancer Father    • Hepatitis Father    • Cancer Father          - colon cancer       Social History     Occupational History   • Not on file   Tobacco Use   • Smoking status: Never   • Smokeless tobacco: Never   Vaping Use   • Vaping Use: Never used   Substance and Sexual Activity   • Alcohol use:  Yes     Alcohol/week: 2 0 standard drinks     Types: 2 Standard drinks or equivalent per week     Comment: social   • Drug use: Never   • Sexual activity: Yes     Partners: Male     Birth control/protection: OCP         Current Outpatient Medications:   •  acetaminophen (TYLENOL) 325 mg tablet, Take 3 tablets (975 mg total) by mouth every 8 (eight) hours, Disp: , Rfl: 0  •  aspirin 325 mg tablet, Take 1 tablet (325 mg total) by mouth 2 (two) times a day for 84 doses, Disp: 84 tablet, Rfl: 0  •  cetirizine (ZyrTEC) 10 mg tablet, Take 10 mg by mouth daily, Disp: , Rfl:   •  docusate sodium (COLACE) 100 mg capsule, Take 1 capsule (100 mg total) by mouth 2 (two) times a day, Disp: 60 capsule, Rfl: 0  •  famotidine (PEPCID) 20 mg tablet, Take 1 tablet (20 mg total) by mouth 2 (two) times a day (Patient taking differently: Take 20 mg by mouth daily), Disp: 60 tablet, Rfl: 11  •  lansoprazole (PREVACID) 30 mg capsule, as needed  , Disp: , Rfl:   •  Levonorgest-Eth Estrad 91-Day 0 15-0 03 &0 01 MG TABS, Take 1 tablet by mouth every morning  , Disp: , Rfl: 2  •  montelukast (SINGULAIR) 10 mg tablet, Take 10 mg by mouth daily, Disp: , Rfl:   •  oxyCODONE (Roxicodone) 5 immediate release tablet, Take 1-2 tablets by mouth every 4-6 hours as needed for pain, Disp: 60 tablet, Rfl: 0  •  pimecrolimus (ELIDEL) 1 % cream, MIX WITH KETOCONAZOLE CREAM BEFORE APPLYING TO FACE TWICE DAILY, Disp: , Rfl:   •  fluticasone-vilanterol (Breo Ellipta) 100-25 mcg/inh inhaler, Inhale 1 puff daily Rinse mouth after use , Disp: 60 blister, Rfl: 3    No Known Allergies    Objective:  Vitals:    02/08/23 1740   BP: 140/75   Pulse: 85   Temp: 98 2 °F (36 8 °C)       Body mass index is 37 94 kg/m²  Left Knee Exam     Muscle Strength   The patient has normal left knee strength  Tenderness   The patient is experiencing tenderness in the patella and medial retinaculum      Range of Motion   Extension:  5 abnormal   Flexion:  90 abnormal     Tests   Varus: negative Valgus: negative  Drawer:  Anterior - negative         Other   Erythema: absent  Scars: present  Sensation: normal  Pulse: present  Swelling: mild  Effusion: no effusion present    Comments:  Anterior incision healing well without erythema, warmth, drainage, or dehiscence  No sign of infection  Prosthesis stable to stressing at 5, 30, and 90 degrees  Thigh and calf soft and nontender  Ambulates with a slightly antalgic gait and a cane  Grossly distally neurovascularly intact            Observations   Left Knee   Negative for effusion  Physical Exam  Vitals and nursing note reviewed  Constitutional:       Appearance: Normal appearance  She is well-developed  Comments: Body mass index is 37 94 kg/m²  HENT:      Head: Normocephalic and atraumatic  Right Ear: External ear normal       Left Ear: External ear normal    Eyes:      Extraocular Movements: Extraocular movements intact  Conjunctiva/sclera: Conjunctivae normal    Cardiovascular:      Rate and Rhythm: Normal rate  Pulses: Normal pulses  Pulmonary:      Effort: Pulmonary effort is normal    Musculoskeletal:      Cervical back: Normal range of motion  Left knee: No effusion  Comments: See ortho exam   Skin:     General: Skin is warm and dry  Neurological:      General: No focal deficit present  Mental Status: She is alert and oriented to person, place, and time  Mental status is at baseline  Psychiatric:         Mood and Affect: Mood normal          Behavior: Behavior normal          Thought Content:  Thought content normal          Judgment: Judgment normal

## 2023-02-09 ENCOUNTER — OFFICE VISIT (OUTPATIENT)
Dept: PHYSICAL THERAPY | Facility: CLINIC | Age: 48
End: 2023-02-09

## 2023-02-09 DIAGNOSIS — M17.12 PRIMARY OSTEOARTHRITIS OF LEFT KNEE: Primary | ICD-10-CM

## 2023-02-09 DIAGNOSIS — G89.29 CHRONIC PAIN OF LEFT KNEE: ICD-10-CM

## 2023-02-09 DIAGNOSIS — Z96.652 PRESENCE OF ARTIFICIAL KNEE JOINT, LEFT: ICD-10-CM

## 2023-02-09 DIAGNOSIS — M25.562 CHRONIC PAIN OF LEFT KNEE: ICD-10-CM

## 2023-02-09 NOTE — PROGRESS NOTES
Daily Note     Today's date: 2023  Patient name: Sully Porras  : 1975  MRN: [id number]  Referring provider: Dafne Serrano DO  Dx:   Encounter Diagnosis     ICD-10-CM    1  Primary osteoarthritis of left knee  M17 12       2  Chronic pain of left knee  M25 562     G89 29       3  Presence of artificial knee joint, left  Z96 652           Start Time: 1657          Subjective: The Dr bent my knee to 95 degrees  Objective: See treatment diary below      Assessment: Tolerated treatment fair  Patient demonstrated fatigue post treatment and would benefit from continued PT  Patient arrives ambulating using [facility]  Plan: Progress treatment as tolerated  Precautions:   Past Medical History:   Diagnosis Date   • Arthritis    • Asthma    • Contact lens/glasses fitting    • Family history of colon cancer     hep C-liver cancer- to colon cancer-father   • GERD (gastroesophageal reflux disease)    • Obesity (BMI 30 0-34  9)            DOS: 2023  Daily Treatment Log:  Date 2023    Visit # 5 6 7 11    Manual  L knee PROM/stretching as tolerated L knee PROM/stretching as tolerated L knee PROM/stretching (supine/prone)       Gentle patellar mobs Gentle patellar mobs patellar mobs    L knee ROM   -5 -> 60 degrees (supine)  -5 -> 70 degrees (prone)    There Exer   L LE stretching over edge of mat w/MH 1 min x 3 reps  L LE stretching over edge of mat  1 min x 3 reps    Bridge On PB 20x 20x on red PB 2x10 reps w/ hip add 2x10 reps w/ hip add    LAQ 20x seated  ---- ----     qset  hep hep HEP    Heel slides 10x w/ red PB; 10 x w/ SOS /pillow case on mat  20x w/ red PB & SOS 20x w/ red PB & SOS ------    LAQ 20x 20x ---- hep    SLR 10x attempts q/ quad sets 2x10 reps AAROM 10x AAROM -----    B/L HR/TR Standing 20x 20x standing 20x HR standing 20x HR standing with focus on knee extension    JOSE 10x ---- --- -----    Mini squats 15x 10x Wall slides 15x  -----     NuStep NuStep 10 min (ROM only) NuStep 10 min (ROM only) NuStep 10 min (ROM only) BIKE rocking seat #5 x 10 min    Pt educ post-op precautions, WB status, edema mgt, DVT & infection prevention   4" step ups/lateral x 10 reps each @rail 6" step ups 10 reps;  8" step x 10 reps     HEP    TG L 15 squats w/ MH x 8 min    There Activ        Stair training w/ rail & folded RW        Car transfers        Curb training        Review virtual home assessment                STS STS 22" mat 2x10 reps  STS 22" mat 2x10 reps  STS 22" mat 2x10 reps  STS 2x10 reps hi lo mat     NMReed                                                Modalities          CP x 10 min (supine) @home -----                       Access Code: [email or web address]  URL: [email or web address]  Date: 01/12/2023  Prepared by: Rick Racer    Exercises  Supine Single Leg Ankle Pumps - 1 x daily - 7 x weekly - 3 sets - 10 reps  Long Sitting Calf Stretch with Strap - 1 x daily - 7 x weekly - 5 sets - 20s hold  Long Sitting Quad Set - 1 x daily - 7 x weekly - 2 sets - 10 reps - 3s hold  Supine Heel Slide with Strap - 1 x daily - 7 x weekly - 2 sets - 5 reps - 5s hold  Seated Long Arc Quad - 1 x daily - 7 x weekly - 2 sets - 10 reps  Seated Knee Flexion AAROM - 1 x daily - 7 x weekly - 2 sets - 5 reps - 5s hold

## 2023-02-13 ENCOUNTER — OFFICE VISIT (OUTPATIENT)
Dept: PHYSICAL THERAPY | Facility: CLINIC | Age: 48
End: 2023-02-13

## 2023-02-13 DIAGNOSIS — M17.12 PRIMARY OSTEOARTHRITIS OF LEFT KNEE: Primary | ICD-10-CM

## 2023-02-13 DIAGNOSIS — M25.562 CHRONIC PAIN OF LEFT KNEE: ICD-10-CM

## 2023-02-13 DIAGNOSIS — G89.29 CHRONIC PAIN OF LEFT KNEE: ICD-10-CM

## 2023-02-13 DIAGNOSIS — Z96.652 PRESENCE OF ARTIFICIAL KNEE JOINT, LEFT: ICD-10-CM

## 2023-02-13 NOTE — PROGRESS NOTES
Daily Note     Today's date: 2023  Patient name: Jay Saab  : 1975  MRN: [id number]  Referring provider: Anat Gan DO  Dx:   Encounter Diagnosis     ICD-10-CM    1  Primary osteoarthritis of left knee  M17 12       2  Chronic pain of left knee  M25 562     G89 29       3  Presence of artificial knee joint, left  Z96 652                      Subjective: I did good with steps at home  Objective: See treatment diary below      Assessment: Tolerated treatment fair  Patient demonstrated fatigue post treatment, exhibited good technique with therapeutic exercises and would benefit from continued PT      Plan: Progress treatment as tolerated  Precautions:   Past Medical History:   Diagnosis Date   • Arthritis    • Asthma    • Contact lens/glasses fitting    • Family history of colon cancer     hep C-liver cancer- to colon cancer-father   • GERD (gastroesophageal reflux disease)    • Obesity (BMI 30 0-34  9)            DOS: 2023  Daily Treatment Log:  Date 2023    Visit # 5 6 7 12    Manual  L knee PROM/stretching as tolerated L knee PROM/stretching as tolerated L knee PROM/stretching (supine/prone)       Gentle patellar mobs Gentle patellar mobs patellar mobs    L knee ROM   -5 -> 60 degrees (supine)  -5 -> 70 degrees (prone)    There Exer   L LE stretching over edge of mat w/MH 1 min x 3 reps  L LE stretching over edge of mat  1 min x 3 reps    Bridge On PB 20x 20x on red PB 2x10 reps w/ hip add 2x10 reps w/ hip add    LAQ 20x seated  ---- ----     qset  hep hep HEP    Heel slides 10x w/ red PB; 10 x w/ SOS /pillow case on mat  20x w/ red PB & SOS 20x w/ red PB & SOS ------    LAQ 20x 20x ---- hep    SLR 10x attempts q/ quad sets 2x10 reps AAROM 10x AAROM -----    B/L HR/TR Standing 20x 20x standing 20x HR standing 20x HR standing with focus on knee extension    JOSE 10x ---- --- -----    Mini squats 15x 10x Wall slides 15x  -----     NuStep NuStep 10 min (ROM only) NuStep 10 min (ROM only) NuStep 10 min (ROM only) BIKE rocking seat #5 x 10 min    Pt educ post-op precautions, WB status, edema mgt, DVT & infection prevention   4" step ups/lateral x 10 reps each @rail 8" step ups 10 reps;  8" step x 10 reps     HEP    TG L 15 squats w/ MH x 8 min    There Activ        Stair training w/ rail & folded RW        Car transfers        Curb training        Review virtual home assessment                STS STS 22" mat 2x10 reps  STS 22" mat 2x10 reps  STS 22" mat 2x10 reps  STS 2x10 reps hi lo mat     NMReed                                                Modalities          CP x 10 min (supine) @home -------                       Access Code: [email or web address]  URL: [email or web address]  Date: 01/12/2023  Prepared by: Deysi Rodriguez    Exercises  Supine Single Leg Ankle Pumps - 1 x daily - 7 x weekly - 3 sets - 10 reps  Long Sitting Calf Stretch with Strap - 1 x daily - 7 x weekly - 5 sets - 20s hold  Long Sitting Quad Set - 1 x daily - 7 x weekly - 2 sets - 10 reps - 3s hold  Supine Heel Slide with Strap - 1 x daily - 7 x weekly - 2 sets - 5 reps - 5s hold  Seated Long Arc Quad - 1 x daily - 7 x weekly - 2 sets - 10 reps  Seated Knee Flexion AAROM - 1 x daily - 7 x weekly - 2 sets - 5 reps - 5s hold

## 2023-02-15 ENCOUNTER — OFFICE VISIT (OUTPATIENT)
Dept: PHYSICAL THERAPY | Facility: CLINIC | Age: 48
End: 2023-02-15

## 2023-02-15 DIAGNOSIS — G89.29 CHRONIC PAIN OF LEFT KNEE: ICD-10-CM

## 2023-02-15 DIAGNOSIS — M17.12 PRIMARY OSTEOARTHRITIS OF LEFT KNEE: Primary | ICD-10-CM

## 2023-02-15 DIAGNOSIS — Z96.652 STATUS POST TOTAL KNEE REPLACEMENT NOT USING CEMENT, LEFT: ICD-10-CM

## 2023-02-15 DIAGNOSIS — Z96.652 PRESENCE OF ARTIFICIAL KNEE JOINT, LEFT: ICD-10-CM

## 2023-02-15 DIAGNOSIS — M25.562 CHRONIC PAIN OF LEFT KNEE: ICD-10-CM

## 2023-02-15 RX ORDER — OXYCODONE HYDROCHLORIDE 5 MG/1
5 TABLET ORAL EVERY 6 HOURS PRN
Qty: 30 TABLET | Refills: 0 | Status: SHIPPED | OUTPATIENT
Start: 2023-02-15

## 2023-02-15 RX ORDER — OXYCODONE HYDROCHLORIDE 5 MG/1
TABLET ORAL
Qty: 60 TABLET | Refills: 0 | Status: CANCELLED | OUTPATIENT
Start: 2023-02-15

## 2023-02-15 NOTE — PROGRESS NOTES
Daily Note     Today's date: 2/15/2023  Patient name: William Castillo  : 1975  MRN: [id number]  Referring provider: Chase Lima DO  Dx:   Encounter Diagnosis     ICD-10-CM    1  Primary osteoarthritis of left knee  M17 12       2  Chronic pain of left knee  M25 562     G89 29       3  Presence of artificial knee joint, left  Z96 652                      Subjective: I've been stretching a lot even at work  Objective: See treatment diary below      Assessment: Tolerated treatment fair  Patient demonstrated fatigue post treatment and would benefit from continued PT  Patient arrives carrying [facility]    Plan: Progress treatment as tolerated  Precautions:   Past Medical History:   Diagnosis Date   • Arthritis    • Asthma    • Contact lens/glasses fitting    • Family history of colon cancer     hep C-liver cancer- to colon cancer-father   • GERD (gastroesophageal reflux disease)    • Obesity (BMI 30 0-34  9)            DOS: 2023  Daily Treatment Log:  Date 2023 1/25 1/26 2/15    Visit # 5 6 7 13    Manual  L knee PROM/stretching as tolerated L knee PROM/stretching as tolerated L knee PROM/stretching (supine/prone)       Gentle patellar mobs Gentle patellar mobs patellar mobs    L knee ROM   -5 -> 60 degrees (supine)  -5 -> 70 degrees (prone)    There Exer   L LE stretching over edge of mat w/MH 1 min x 3 reps  L LE stretching over edge of mat  1 min x 3 reps    Bridge On PB 20x 20x on red PB 2x10 reps w/ hip add 2x10 reps w/ hip add    LAQ 20x seated  ---- ----     qset  hep hep HEP    Heel slides 10x w/ red PB; 10 x w/ SOS /pillow case on mat  20x w/ red PB & SOS 20x w/ red PB & SOS ------    LAQ 20x 20x ---- hep    SLR 10x attempts q/ quad sets 2x10 reps AAROM 10x AAROM -----    B/L HR/TR Standing 20x 20x standing 20x HR standing 20x HR standing with focus on knee extension    JOSE 10x ---- --- -----    Mini squats 15x 10x Wall slides 15x  -----     NuStep NuStep 10 min (ROM only) NuStep 10 min (ROM only) NuStep 10 min (ROM only) BIKE rocking seat #5 x 10 min    Pt educ post-op precautions, WB status, edema mgt, DVT & infection prevention   4" step ups/lateral x 10 reps each @rail 8" step ups 10 reps;  8" step x 10 reps     HEP    Seated w/MH with ankle weight x 8 minutes    There Activ        Stair training w/ rail & folded RW        Car transfers        Curb training        Review virtual home assessment                STS STS 22" mat 2x10 reps  STS 22" mat 2x10 reps  STS 22" mat 2x10 reps  STS 2x10 reps hi lo mat     NMReed                                                Modalities          CP x 10 min (supine) @home -------                       Access Code: [email or web address]  URL: [email or web address]  Date: 01/12/2023  Prepared by: Winnie Alvarez    Exercises  Supine Single Leg Ankle Pumps - 1 x daily - 7 x weekly - 3 sets - 10 reps  Long Sitting Calf Stretch with Strap - 1 x daily - 7 x weekly - 5 sets - 20s hold  Long Sitting Quad Set - 1 x daily - 7 x weekly - 2 sets - 10 reps - 3s hold  Supine Heel Slide with Strap - 1 x daily - 7 x weekly - 2 sets - 5 reps - 5s hold  Seated Long Arc Quad - 1 x daily - 7 x weekly - 2 sets - 10 reps  Seated Knee Flexion AAROM - 1 x daily - 7 x weekly - 2 sets - 5 reps - 5s hold

## 2023-02-15 NOTE — TELEPHONE ENCOUNTER
Patient has been made aware this has been sent to the Pharmacy but at a tapered dose  Patient verbalized understanding

## 2023-02-16 ENCOUNTER — OFFICE VISIT (OUTPATIENT)
Dept: PHYSICAL THERAPY | Facility: CLINIC | Age: 48
End: 2023-02-16

## 2023-02-16 DIAGNOSIS — M25.562 CHRONIC PAIN OF LEFT KNEE: ICD-10-CM

## 2023-02-16 DIAGNOSIS — Z96.652 PRESENCE OF ARTIFICIAL KNEE JOINT, LEFT: ICD-10-CM

## 2023-02-16 DIAGNOSIS — M17.12 PRIMARY OSTEOARTHRITIS OF LEFT KNEE: Primary | ICD-10-CM

## 2023-02-16 DIAGNOSIS — G89.29 CHRONIC PAIN OF LEFT KNEE: ICD-10-CM

## 2023-02-16 NOTE — PROGRESS NOTES
Daily Note     Today's date: 2023  Patient name: Garth Delarosa  : 1975  MRN: [id number]  Referring provider: Shireen Gonzaels DO  Dx:   Encounter Diagnosis     ICD-10-CM    1  Primary osteoarthritis of left knee  M17 12       2  Chronic pain of left knee  M25 562     G89 29       3  Presence of artificial knee joint, left  Z96 652           Start Time: 1658          Subjective: My knee was sore last night, but I didn't take any pain medication, I used ice & massaged my knee  Objective: See treatment diary below      Assessment: Tolerated treatment fair  Patient demonstrated fatigue post treatment, exhibited good technique with therapeutic exercises and would benefit from continued PT  Patient arrives carrying [facility], ambulating with significant antalgic gait, stiff L knee gait  Plan: Progress treatment as tolerated  Precautions:   Past Medical History:   Diagnosis Date   • Arthritis    • Asthma    • Contact lens/glasses fitting    • Family history of colon cancer     hep C-liver cancer- to colon cancer-father   • GERD (gastroesophageal reflux disease)    • Obesity (BMI 30 0-34  9)            DOS: 2023  Daily Treatment Log:  Date 2023    Visit # 5 6 7 14    Manual  L knee PROM/stretching as tolerated L knee PROM/stretching as tolerated L knee PROM/stretching (supine)       Gentle patellar mobs Gentle patellar mobs patellar mobs    L knee ROM   -5 -> 60 degrees (supine)  -5 -> 70 degrees (prone)    There Exer   L LE stretching over edge of mat w/MH 1 min x 3 reps  L LE stretching over edge of mat  1 min x 3 reps    Bridge On PB 20x 20x on red PB 2x10 reps w/ hip add -----    LAQ 20x seated  ---- ----     qset  hep hep HEP    Heel slides 10x w/ red PB; 10 x w/ SOS /pillow case on mat  20x w/ red PB & SOS 20x w/ red PB & SOS ------    LAQ 20x 20x ---- hep    SLR 10x attempts q/ quad sets 2x10 reps AAROM 10x AAROM -----    B/L HR/TR Standing 20x 20x standing 20x HR standing 20x HR standing with focus on knee extension    JOSE 10x ---- --- -----    Mini squats 15x 10x Wall slides 15x  -----     NuStep NuStep 10 min (ROM only) NuStep 10 min (ROM only) NuStep 10 min (ROM only) BIKE rocking seat #5 x 10 min    Pt educ post-op precautions, WB status, edema mgt, DVT & infection prevention   4" step ups/lateral x 10 reps each @rail 8" step ups 10 reps;  8" step x 10 reps     HEP    Seated w/MH with ankle weight x 8 minutes    There Activ    TRX squats x 20 reps     Stair training w/ rail & folded RW        Car transfers        Curb training        Review virtual home assessment                STS STS 22" mat 2x10 reps  STS 22" mat 2x10 reps  STS 22" mat 2x10 reps  STS 2x10 reps hi lo mat     NMReed            [place] retro gait @#9 x 10 reps                                     Modalities          CP x 10 min (supine) @home -------                       Access Code: [email or web address]  URL: [email or web address]  Date: 01/12/2023  Prepared by: Cloteal Kras    Exercises  Supine Single Leg Ankle Pumps - 1 x daily - 7 x weekly - 3 sets - 10 reps  Long Sitting Calf Stretch with Strap - 1 x daily - 7 x weekly - 5 sets - 20s hold  Long Sitting Quad Set - 1 x daily - 7 x weekly - 2 sets - 10 reps - 3s hold  Supine Heel Slide with Strap - 1 x daily - 7 x weekly - 2 sets - 5 reps - 5s hold  Seated Long Arc Quad - 1 x daily - 7 x weekly - 2 sets - 10 reps  Seated Knee Flexion AAROM - 1 x daily - 7 x weekly - 2 sets - 5 reps - 5s hold

## 2023-02-20 ENCOUNTER — OFFICE VISIT (OUTPATIENT)
Dept: PHYSICAL THERAPY | Facility: CLINIC | Age: 48
End: 2023-02-20

## 2023-02-20 DIAGNOSIS — M25.562 CHRONIC PAIN OF LEFT KNEE: ICD-10-CM

## 2023-02-20 DIAGNOSIS — M17.12 PRIMARY OSTEOARTHRITIS OF LEFT KNEE: Primary | ICD-10-CM

## 2023-02-20 DIAGNOSIS — Z96.652 PRESENCE OF ARTIFICIAL KNEE JOINT, LEFT: ICD-10-CM

## 2023-02-20 DIAGNOSIS — G89.29 CHRONIC PAIN OF LEFT KNEE: ICD-10-CM

## 2023-02-20 NOTE — PROGRESS NOTES
Daily Note     Today's date: 2023  Patient name: Anjali James  : 1975  MRN: [id number]  Referring provider: Flakito Mills DO  Dx:   Encounter Diagnosis     ICD-10-CM    1  Primary osteoarthritis of left knee  M17 12       2  Chronic pain of left knee  M25 562     G89 29       3  Presence of artificial knee joint, left  Z96 652           Start Time: 1538  Stop Time: 1630  Total time in clinic (min): 52 minutes    Subjective: My knee is very sore & stiff today from going to [place] on Saturday & then putting a vanity together on   Objective: See treatment diary below      Assessment: Tolerated treatment fair  Patient demonstrated fatigue post treatment and would benefit from continued PT  Patient performed revolutions on bike, 6 backward and 1 forward, with assistance  Plan: Progress treatment as tolerated  Precautions:   Past Medical History:   Diagnosis Date   • Arthritis    • Asthma    • Contact lens/glasses fitting    • Family history of colon cancer     hep C-liver cancer- to colon cancer-father   • GERD (gastroesophageal reflux disease)    • Obesity (BMI 30 0-34  9)            DOS: 2023  Daily Treatment Log:  Date 2023   Visit # 5 6 7 14 15   Manual  L knee PROM/stretching as tolerated L knee PROM/stretching as tolerated L knee PROM/stretching (supine)  L knee PROM/stretching (seated)      Gentle patellar mobs Gentle patellar mobs patellar mobs Patellar mobs   L knee ROM   -5 -> 60 degrees (supine)  -5 -> 70 degrees (prone)    There Exer   L LE stretching over edge of mat w/MH 1 min x 3 reps  L LE stretching over edge of mat  1 min x 3 reps ----   Bridge On PB 20x 20x on red PB 2x10 reps w/ hip add ----- ----   LAQ 20x seated  ---- ----     qset  hep hep HEP    Heel slides 10x w/ red PB; 10 x w/ SOS /pillow case on mat  20x w/ red PB & SOS 20x w/ red PB & SOS ------ ----   LAQ 20x 20x ---- hep    SLR 10x attempts q/ quad sets 2x10 reps AAROM 10x AAROM ----- ----   B/L HR/TR Standing 20x 20x standing 20x HR standing 20x HR standing with focus on knee extension -----   JOSE 10x ---- --- ----- ---   Mini squats 15x 10x Wall slides 15x  -----  ----   NuStep NuStep 10 min (ROM only) NuStep 10 min (ROM only) NuStep 10 min (ROM only) BIKE rocking seat #5 x 10 min BIKE rocking seat #8 x 10 min   Pt educ post-op precautions, WB status, edema mgt, DVT & infection prevention   4" step ups/lateral x 10 reps each @rail 8" step ups 10 reps 8" step ups 10 reps   HEP    Seated w/MH with ankle weight x 8 minutes Seated w/MH with ankle weight x 8 minutes   There Activ    TRX squats x 20 reps  TRX squats x 20 reps   Stair training w/ rail & folded RW        Car transfers        Curb training        Review virtual home assessment                STS STS 22" mat 2x10 reps  STS 22" mat 2x10 reps  STS 22" mat 2x10 reps  STS 2x10 reps hi lo mat  STS 2x10 reps hi lo mat    NMReed            [place] retro gait @#9 x 10 reps  Caleb retro gait @#9 x 10 reps                                    Modalities          CP x 10 min (supine) @home ------- -----                      Access Code: [email or web address]  URL: [email or web address]  Date: 01/12/2023  Prepared by: Anoop Gonzalez    Exercises  Supine Single Leg Ankle Pumps - 1 x daily - 7 x weekly - 3 sets - 10 reps  Long Sitting Calf Stretch with Strap - 1 x daily - 7 x weekly - 5 sets - 20s hold  Long Sitting Quad Set - 1 x daily - 7 x weekly - 2 sets - 10 reps - 3s hold  Supine Heel Slide with Strap - 1 x daily - 7 x weekly - 2 sets - 5 reps - 5s hold  Seated Long Arc Quad - 1 x daily - 7 x weekly - 2 sets - 10 reps  Seated Knee Flexion AAROM - 1 x daily - 7 x weekly - 2 sets - 5 reps - 5s hold

## 2023-02-22 ENCOUNTER — OFFICE VISIT (OUTPATIENT)
Dept: OBGYN CLINIC | Facility: CLINIC | Age: 48
End: 2023-02-22

## 2023-02-22 ENCOUNTER — OFFICE VISIT (OUTPATIENT)
Dept: PHYSICAL THERAPY | Facility: CLINIC | Age: 48
End: 2023-02-22

## 2023-02-22 VITALS
WEIGHT: 226.8 LBS | SYSTOLIC BLOOD PRESSURE: 153 MMHG | HEIGHT: 65 IN | DIASTOLIC BLOOD PRESSURE: 87 MMHG | TEMPERATURE: 98.4 F | BODY MASS INDEX: 37.79 KG/M2 | HEART RATE: 103 BPM

## 2023-02-22 DIAGNOSIS — M25.562 CHRONIC PAIN OF LEFT KNEE: ICD-10-CM

## 2023-02-22 DIAGNOSIS — M17.12 PRIMARY OSTEOARTHRITIS OF LEFT KNEE: Primary | ICD-10-CM

## 2023-02-22 DIAGNOSIS — Z96.652 STATUS POST TOTAL KNEE REPLACEMENT NOT USING CEMENT, LEFT: Primary | ICD-10-CM

## 2023-02-22 DIAGNOSIS — G89.29 CHRONIC PAIN OF LEFT KNEE: ICD-10-CM

## 2023-02-22 DIAGNOSIS — Z96.652 AFTERCARE FOLLOWING LEFT KNEE JOINT REPLACEMENT SURGERY: ICD-10-CM

## 2023-02-22 DIAGNOSIS — Z47.1 AFTERCARE FOLLOWING LEFT KNEE JOINT REPLACEMENT SURGERY: ICD-10-CM

## 2023-02-22 DIAGNOSIS — Z96.652 PRESENCE OF ARTIFICIAL KNEE JOINT, LEFT: ICD-10-CM

## 2023-02-22 RX ORDER — MELOXICAM 15 MG/1
15 TABLET ORAL DAILY
COMMUNITY
Start: 2023-01-27

## 2023-02-22 NOTE — PROGRESS NOTES
Daily Note     Today's date: 2023  Patient name: Seb Ventura  : 1975  MRN: [id number]  Referring provider: Adalgisa Caruso DO  Dx:   Encounter Diagnosis     ICD-10-CM    1  Primary osteoarthritis of left knee  M17 12       2  Chronic pain of left knee  M25 562     G89 29       3  Presence of artificial knee joint, left  Z96 652                      Subjective: My L knee is very stiff & tight today  "I feel like I turned the corner yesterday "      Objective: See treatment diary below      Assessment: Tolerated treatment fair  Patient demonstrated fatigue post treatment and would benefit from continued PT  Patient performed 3 full revolutions backward on the bicycle  Patient arrives ambulating without assistive device, proper gait pattern reviewed  Plan: Progress treatment as tolerated  Precautions:   Past Medical History:   Diagnosis Date   • Arthritis    • Asthma    • Contact lens/glasses fitting    • Family history of colon cancer     hep C-liver cancer- to colon cancer-father   • GERD (gastroesophageal reflux disease)    • Obesity (BMI 30 0-34  9)            DOS: 2023  Daily Treatment Log:  Date    Visit # 16  7 14 15   Manual L knee PROM/stretching (supine)  L knee PROM/stretching as tolerated L knee PROM/stretching (supine)  L knee PROM/stretching (seated)       Gentle patellar mobs patellar mobs Patellar mobs   L knee ROM  -5 -> 81   (SUPINE)   -5 -> 70 degrees (prone)    There Exer   L LE stretching over edge of mat w/MH 1 min x 3 reps  L LE stretching over edge of mat  1 min x 3 reps ----   Bridge 2x10 reps   2x10 reps w/ hip add ----- ----   LAQ ----  ---- ----     qset ---  hep HEP    Heel slides -----  20x w/ red PB & SOS ------ ----   LAQ -----  ---- hep    SLR -----  10x AAROM ----- ----   B/L HR/TR -----  20x HR standing 20x HR standing with focus on knee extension -----      --- ----- ---      Wall slides 15x  -----  ----   bike Rocking seat #8 x 10 min NuStep 10 min (ROM only) BIKE rocking seat #5 x 10 min BIKE rocking seat #8 x 10 min   Step ups 8" x 10 reps   4" step ups/lateral x 10 reps each @rail 8" step ups 10 reps 8" step ups 10 reps   Seated w/MH with ankle weight 8 minutes   Seated w/MH with ankle weight x 8 minutes Seated w/MH with ankle weight x 8 minutes   TRX squats 20x   TRX squats x 20 reps  TRX squats x 20 reps   Stair training w/ rail & folded RW ---       Car transfers --       Curb training --       Review virtual home assessment ---               STS  2x10 reps low hi lo mat  STS 22" mat 2x10 reps  STS 2x10 reps hi lo mat  STS 2x10 reps hi lo mat    NMReed        [place] retro gait  @#9 x 10 reps    [place] retro gait @#9 x 10 reps  Caleb retro gait @#9 x 10 reps                                    Modalities           @home ------- -----                      Access Code: [email or web address]  URL: [email or web address]  Date: 01/12/2023  Prepared by: Maribel Segovia    Exercises  Supine Single Leg Ankle Pumps - 1 x daily - 7 x weekly - 3 sets - 10 reps  Long Sitting Calf Stretch with Strap - 1 x daily - 7 x weekly - 5 sets - 20s hold  Long Sitting Quad Set - 1 x daily - 7 x weekly - 2 sets - 10 reps - 3s hold  Supine Heel Slide with Strap - 1 x daily - 7 x weekly - 2 sets - 5 reps - 5s hold  Seated Long Arc Quad - 1 x daily - 7 x weekly - 2 sets - 10 reps  Seated Knee Flexion AAROM - 1 x daily - 7 x weekly - 2 sets - 5 reps - 5s hold

## 2023-02-22 NOTE — PROGRESS NOTES
Assessment/Plan:  1  Status post total knee replacement not using cement, left        2  Aftercare following left knee joint replacement surgery          Scribe Attestation    I,:  Nenita Hudson am acting as a scribe while in the presence of the attending physician :       I,:  Mark Garrett, DO personally performed the services described in this documentation    as scribed in my presence :         Kristian Infante is a pleasant 56-year-old female who returns today for follow-up evaluation 6 weeks status post left total knee arthroplasty  I am very pleased with her clinical presentation today in the office  Her range of motion has improved and I explained that there is no indication for manipulation under anesthesia  I would like her to continue with her efforts at physical therapy with a continued focus on improving her full range of motion  She no longer requires DVT prophylaxis  She may continue with activity and work to her tolerance  All of her questions and concerns were addressed today  We will see her back in 6 weeks for her 3-month postoperative appointment with x-ray on arrival     Subjective: Follow-up evaluation 6 weeks status post left total knee arthroplasty    Patient ID: Gray Wayne is a 50 y o  female who returns today for follow-up evaluation 6-week status post left total knee arthroplasty  She reports that she has been doing well and feels as though she has "turned a corner"  She notes improvement in her ROM, swelling, and pain  She is pleased with her progress  She has been participating with physical therapy 3 times per week  She has returned to work and has not had any issues  She denies any new injury or trauma  Review of Systems   Constitutional: Positive for activity change  Negative for chills, fever and unexpected weight change  HENT: Negative for hearing loss, nosebleeds and sore throat  Eyes: Negative for pain, redness and visual disturbance     Respiratory: Negative for cough, shortness of breath and wheezing  Cardiovascular: Negative for chest pain, palpitations and leg swelling  Gastrointestinal: Negative for abdominal pain, nausea and vomiting  Endocrine: Negative for polydipsia and polyuria  Genitourinary: Negative for dysuria and hematuria  Musculoskeletal: Positive for myalgias  Negative for arthralgias and joint swelling  See HPI   Skin: Negative for rash and wound  Neurological: Negative for dizziness, numbness and headaches  Psychiatric/Behavioral: Negative for decreased concentration and suicidal ideas  The patient is not nervous/anxious  Past Medical History:   Diagnosis Date   • Arthritis    • Asthma    • Contact lens/glasses fitting    • Family history of colon cancer     hep C-liver cancer- to colon cancer-father   • GERD (gastroesophageal reflux disease)    • Obesity (BMI 30 0-34  9)        Past Surgical History:   Procedure Laterality Date   •  SECTION      ,    • DILATION AND CURETTAGE OF UTERUS      miscarriage   • EGD     • MT ARTHRP KNE CONDYLE&PLATU MEDIAL&LAT COMPARTMENTS Left 2023    Procedure: ARTHROPLASTY KNEE TOTAL W ROBOT - SAME DAY - PRESS FIT - LEFT;  Surgeon: Bal Cosme DO;  Location: [address] [place] [address];  Service: Orthopedics   • ROTATOR CUFF REPAIR Right    • SHOULDER SURGERY     • TONSILLECTOMY     • TUBAL LIGATION         Family History   Problem Relation Age of Onset   • Heart disease Mother         pacemaker   • Coronary artery disease Mother    • Hypertension Mother         pulmonary   • Hearing loss Mother    • Colon cancer Father            • Liver cancer Father    • Hepatitis Father    • Cancer Father          - colon cancer       Social History     Occupational History   • Not on file   Tobacco Use   • Smoking status: Never   • Smokeless tobacco: Never   Vaping Use   • Vaping Use: Never used   Substance and Sexual Activity   • Alcohol use:  Yes     Alcohol/week: 2 0 standard drinks     Types: 2 Standard drinks or equivalent per week     Comment: social   • Drug use: Never   • Sexual activity: Yes     Partners: Male     Birth control/protection: OCP         Current Outpatient Medications:   •  acetaminophen (TYLENOL) 325 mg tablet, Take 3 tablets (975 mg total) by mouth every 8 (eight) hours, Disp: , Rfl: 0  •  aspirin 325 mg tablet, Take 1 tablet (325 mg total) by mouth 2 (two) times a day for 84 doses (Patient not taking: Reported on 2/22/2023), Disp: 84 tablet, Rfl: 0  •  cetirizine (ZyrTEC) 10 mg tablet, Take 10 mg by mouth daily, Disp: , Rfl:   •  docusate sodium (COLACE) 100 mg capsule, Take 1 capsule (100 mg total) by mouth 2 (two) times a day, Disp: 60 capsule, Rfl: 0  •  famotidine (PEPCID) 20 mg tablet, Take 1 tablet (20 mg total) by mouth 2 (two) times a day (Patient taking differently: Take 20 mg by mouth daily), Disp: 60 tablet, Rfl: 11  •  fluticasone-vilanterol (Breo Ellipta) 100-25 mcg/inh inhaler, Inhale 1 puff daily Rinse mouth after use , Disp: 60 blister, Rfl: 3  •  lansoprazole (PREVACID) 30 mg capsule, as needed  , Disp: , Rfl:   •  Levonorgest-Eth Estrad 91-Day 0 15-0 03 &0 01 MG TABS, Take 1 tablet by mouth every morning  , Disp: , Rfl: 2  •  meloxicam (MOBIC) 15 mg tablet, Take 15 mg by mouth daily As directed (Patient not taking: Reported on 2/22/2023), Disp: , Rfl:   •  montelukast (SINGULAIR) 10 mg tablet, Take 10 mg by mouth daily, Disp: , Rfl:   •  oxyCODONE (Roxicodone) 5 immediate release tablet, Take 1 tablet (5 mg total) by mouth every 6 (six) hours as needed for moderate pain Max Daily Amount: 20 mg, Disp: 30 tablet, Rfl: 0  •  pimecrolimus (ELIDEL) 1 % cream, MIX WITH KETOCONAZOLE CREAM BEFORE APPLYING TO FACE TWICE DAILY, Disp: , Rfl:     No Known Allergies    Objective:  Vitals:    02/22/23 1739   BP: 153/87   Pulse: 103   Temp: 98 4 °F (36 9 °C)       Body mass index is 37 74 kg/m²      Left Knee Exam     Muscle Strength   The patient has normal left knee strength  Tenderness   The patient is experiencing tenderness in the patella and medial retinaculum  Range of Motion   Left knee extension: 3  Left knee flexion: 95  Tests   Varus: negative Valgus: negative  Drawer:  Anterior - negative     Posterior - negative    Other   Erythema: absent  Scars: present (Well-healed anterior operative scar)  Sensation: normal  Pulse: present  Swelling: none  Effusion: no effusion present    Comments:  Stable at 3, 30, and 90 degrees  Grossly distally neurovascularly intact            Observations   Left Knee   Negative for effusion  Physical Exam  Vitals and nursing note reviewed  Constitutional:       Appearance: She is well-developed  HENT:      Head: Normocephalic and atraumatic  Right Ear: External ear normal       Left Ear: External ear normal    Eyes:      General: No scleral icterus  Extraocular Movements: Extraocular movements intact  Conjunctiva/sclera: Conjunctivae normal    Cardiovascular:      Rate and Rhythm: Normal rate  Pulmonary:      Effort: Pulmonary effort is normal  No respiratory distress  Musculoskeletal:      Cervical back: Normal range of motion and neck supple  Left knee: No effusion  Comments: See Ortho exam   Skin:     General: Skin is warm and dry  Neurological:      Mental Status: She is alert and oriented to person, place, and time  Psychiatric:         Behavior: Behavior normal            This document was created using speech voice recognition software  Grammatical errors, random word insertions, pronoun errors, and incomplete sentences are an occasional consequence of this system due to software limitations, ambient noise, and hardware issues  Any formal questions or concerns about content, text, or information contained within the body of this dictation should be directly addressed to the provider for clarification

## 2023-02-28 ENCOUNTER — RA CDI HCC (OUTPATIENT)
Dept: OTHER | Facility: HOSPITAL | Age: 48
End: 2023-02-28

## 2023-02-28 ENCOUNTER — EVALUATION (OUTPATIENT)
Dept: PHYSICAL THERAPY | Facility: CLINIC | Age: 48
End: 2023-02-28

## 2023-02-28 DIAGNOSIS — M25.562 CHRONIC PAIN OF LEFT KNEE: ICD-10-CM

## 2023-02-28 DIAGNOSIS — M17.12 PRIMARY OSTEOARTHRITIS OF LEFT KNEE: Primary | ICD-10-CM

## 2023-02-28 DIAGNOSIS — Z96.652 PRESENCE OF ARTIFICIAL KNEE JOINT, LEFT: ICD-10-CM

## 2023-02-28 DIAGNOSIS — G89.29 CHRONIC PAIN OF LEFT KNEE: ICD-10-CM

## 2023-02-28 NOTE — PROGRESS NOTES
[facility]  coding opportunities          Chart Reviewed number of suggestions sent to Provider: 1     Patients Insurance        Commercial Insurance: [employer]       [id number]: Asthma, unspecified asthma severity, unspecified whether complicated, unspecified whether persistent     Found in active problem list - please review and assess and document per MEAT if applicable for [id number]

## 2023-02-28 NOTE — PROGRESS NOTES
PT Evaluation     Today's date: 2023  Patient name: Jay Saab  : 1975  MRN: [id number]  Referring provider: Anat Gan DO  Dx:   Encounter Diagnosis     ICD-10-CM    1  Primary osteoarthritis of left knee  M17 12       2  Chronic pain of left knee  M25 562     G89 29       3  Presence of artificial knee joint, left  [id number]                      Assessment  Assessment details: Patient is arriving at clinic for repeat evaluation following a left TKA performed on 2023  Patient has been consistent with attendance with therapy, motivated during each treatment session, and shows compliance with HEP  At this time patient has exhibited good strength in both hips and knees however she does present with significant stiffness in her left knee limiting range of motion into knee flexion measured at 90 degrees today following exercise and heat  MD is aware of limitation at this time  Patient would benefit from continued skilled intervention to focus on ROM and function  Impairments: abnormal gait, abnormal or restricted ROM, activity intolerance, impaired physical strength, lacks appropriate home exercise program and pain with function    Symptom irritability: moderate  Goals  Short term goals: 4 weeks post-op  1  Improve walking tolerance to 15 minutes to perform household activity  2  Patient to be able to negotiate 4 stairs with one handrail and good safety to get into home safely  3  Patient to improve knee extension by 20 degrees to improve quality of gait and reduce risk for falls  4  Patient to reduce pain to 4/10 at its worst to improve activity tolerance    Long term goals: 12 weeks  1  Improve walking tolerance to 45 minutes with least restrictive AD and good safety to return to community level ambulation  2  Patient to improve knee flexion to 130 degrees to improve stair negotiation  3  Patient to improve TUG score to 12 seconds or less to reduce risk for falls  4   Patient to reduce pain to 2/10 at its worst to improve QOL and return to function      Plan  Patient would benefit from: skilled physical therapy  Planned modality interventions: TENS, unattended electrical stimulation, thermotherapy: hydrocollator packs and cryotherapy  Planned therapy interventions: abdominal trunk stabilization, flexibility, functional ROM exercises, home exercise program, therapeutic exercise, therapeutic activities, stretching, strengthening, self care, postural training, patient education, neuromuscular re-education, massage, manual therapy and joint mobilization  Frequency: 3x week  Duration in weeks: 14  Treatment plan discussed with: patient        Subjective Evaluation    History of Present Illness  Mechanism of injury: Patient reports since initiation of therapy she has experienced a decrease in pain as well as an increase in functional mobility  Patient notes at this time she has been ambulating without an AD, negotiating stairs with reciprocal pattern upon ascent however does continue to descend stairs with step to pattern  Patient notes she has also returned to work full time  Patient notes she does continue to have stiffness in her knee  Patient notes she does do her HEP multiple times a day with focus on range of motion noting that she does feel looser after however states it stiffens up after            Recurrent probem    Pain  Current pain ratin  At best pain ratin  At worst pain ratin  Quality: discomfort, dull ache and burning  Relieving factors: ice, rest, relaxation and medications  Aggravating factors: walking, standing and stair climbing  Progression: improved    Social Support  Steps to enter house: yes  2  Stairs in house: yes   13  Lives in: multiple-level home  Lives with: adult children    Employment status: working    Diagnostic Tests  X-ray: abnormal  MRI studies: abnormal  Treatments  Previous treatment: physical therapy and injection treatment  Patient Goals  Patient goals for therapy: increased strength, increased motion, decreased pain, decreased edema and return to sport/leisure activities          Objective  AROM:    R  L        Knee flex sup   130  90  Knee ext Qset   -2  -3    MMT:    R  L       Knee flex  5/5  4+/5  Knee exten  5/5  4+/5  Hip flexion  5/5  5/5  Hip ER   5/5  5/5  Hip IR   5/5  5/5  Hip exten  4/5  4/5  Ankle DF  5/5  5/5  Ankle PF  5/5  5/5      Balance:     Tandem R post w/ EO: 30 sec+  Tandem L post  w/ EO: 30 sec+  SLS R w/ EO:  30 sec+  SLS L w/ EO:  30 sec+  FT w/ EO:  30 sec+  FT w/ EC:   30 sec+      Function: Limited in squatting, kneeling, walking, stairs  stairs are reciprocal yes  standing tolerance estimated at 30 minutes  sleep limitation due to pain Yes  Squatting Unable    Gait: Slight antalgic pattern    TU seconds  Patellar mobility: Fair (+) compression     Edema/circumference:    Tibial tuberosity R:  44 cm  Tibiail tuberosisty L:  44 cm  Superior patellar border R: 41 5 cm  Superior patellar border L:  53 cm    Homen's sign: (-)    Flexibility: Impaired flexibility of left gastroc/soleus, quadriceps           Precautions:   Past Medical History:   Diagnosis Date   • Arthritis    • Asthma    • Contact lens/glasses fitting    • Family history of colon cancer     hep C-liver cancer- to colon cancer-father   • GERD (gastroesophageal reflux disease)    • Obesity (BMI 30 0-34  9)            DOS: 2023  Daily Treatment Log:  Date    Visit # 16 17 7 14 15   ROM  -3 -- 90   (SUPINE)      Manual L knee PROM/stretching (supine) L knee PROM/stretching (supine) L knee PROM/stretching as tolerated L knee PROM/stretching (supine)  L knee PROM/stretching (seated)       Gentle patellar mobs patellar mobs Patellar mobs   L knee ROM  -5 -> 81   (SUPINE)   -5 -> 70 degrees (prone)    There Exer   L LE stretching over edge of mat w/MH 1 min x 3 reps  L LE stretching over edge of mat  1 min x 3 reps ----   Bridge 2x10 reps  2x10 reps  2x10 reps w/ hip add ----- ----   LAQ ----  ---- ----     qset ---  hep HEP    Heel slides -----  20x w/ red PB & SOS ------ ----   LAQ -----  ---- hep    SLR -----  10x AAROM ----- ----   B/L HR/TR -----  20x HR standing 20x HR standing with focus on knee extension -----      --- ----- ---      Wall slides 15x  -----  ----   bike Rocking seat #8 x 10 min Rocking seat #8 x 10 min  10 reverse  3 forward NuStep 10 min (ROM only) BIKE rocking seat #5 x 10 min BIKE rocking seat #8 x 10 min   Step ups 8" x 10 reps  8" x 10 reps  4" step ups/lateral x 10 reps each @rail 8" step ups 10 reps 8" step ups 10 reps   Touch downs  6" attempt however patient was unable      Seated w/MH with ankle weight 8 minutes 8 minutes  Seated w/MH with ankle weight x 8 minutes Seated w/MH with ankle weight x 8 minutes   TRX squats 20x 20x  TRX squats x 20 reps  TRX squats x 20 reps   Stair training w/ rail & folded RW ---       Car transfers --       Curb training --       Review virtual home assessment ---               STS  2x10 reps low hi lo mat  2x10 reps low hi lo mat with foot block for ROM STS 22" mat 2x10 reps  STS 2x10 reps hi lo mat  STS 2x10 reps hi lo mat    Viral Watermaniser retro gait  @#9 x 10 reps    Caleb retro gait @#9 x 10 reps  Caleb retro gait @#9 x 10 reps                                    Modalities           @home ------- -----

## 2023-03-02 ENCOUNTER — OFFICE VISIT (OUTPATIENT)
Dept: PHYSICAL THERAPY | Facility: CLINIC | Age: 48
End: 2023-03-02

## 2023-03-02 DIAGNOSIS — M25.562 CHRONIC PAIN OF LEFT KNEE: ICD-10-CM

## 2023-03-02 DIAGNOSIS — G89.29 CHRONIC PAIN OF LEFT KNEE: ICD-10-CM

## 2023-03-02 DIAGNOSIS — M17.12 PRIMARY OSTEOARTHRITIS OF LEFT KNEE: Primary | ICD-10-CM

## 2023-03-02 DIAGNOSIS — Z96.652 PRESENCE OF ARTIFICIAL KNEE JOINT, LEFT: ICD-10-CM

## 2023-03-02 NOTE — PROGRESS NOTES
Daily Note     Today's date: 3/2/2023  Patient name: Kirill Espinal  : 1975  MRN: [id number]  Referring provider: Collins To DO  Dx:   Encounter Diagnosis     ICD-10-CM    1  Primary osteoarthritis of left knee  M17 12       2  Chronic pain of left knee  M25 562     G89 29       3  Presence of artificial knee joint, left  Z96 652                      Subjective: Very sore after last visit  Objective: See treatment diary below      Assessment: Tolerated treatment fair  Patient demonstrated fatigue post treatment, exhibited good technique with therapeutic exercises and would benefit from continued PT  Patient arrives ambulating without assistive device, but utilizes SPC at work  Plan: Progress treatment as tolerated  Precautions:   Past Medical History:   Diagnosis Date   • Arthritis    • Asthma    • Contact lens/glasses fitting    • Family history of colon cancer     hep C-liver cancer- to colon cancer-father   • GERD (gastroesophageal reflux disease)    • Obesity (BMI 30 0-34  9)            DOS: 2023  Daily Treatment Log:  Date 2/22 2/28 3/2 2/16 2/20   Visit # 16 17 18 14 15   ROM  -3 -- 90   (SUPINE) -3 -- 90   (SUPINE)     Manual L knee PROM/stretching (supine) L knee PROM/stretching (supine) L knee PROM/stretching (supine) L knee PROM/stretching (supine)  L knee PROM/stretching (seated)        patellar mobs Patellar mobs   L knee ROM  -5 -> 81   (SUPINE)   -5 -> 70 degrees (prone)    There Exer   L LE stretching over edge of mat L LE stretching over edge of mat  1 min x 3 reps ----   Bridge 2x10 reps  2x10 reps  2x10 reps w/ hip add ----- ----   LAQ ----  ---- ----     qset ---  hep HEP    Heel slides -----  ---- ------ ----   LAQ -----  ---- hep    SLR -----  ---- ----- ----   B/L HR/TR -----  ------ 20x HR standing with focus on knee extension -----      --- ----- ---   Wall slides   -----  -----  ----   bike Rocking seat #8 x 10 min Rocking seat #8 x 10 min  10 reverse  3 forward rocking seat #9 x 10 min   BIKE rocking seat #5 x 10 min BIKE rocking seat #8 x 10 min   Step ups 8" x 10 reps  8" x 10 reps  8" step ups/lateral x 10 reps each @rail 8" step ups 10 reps 8" step ups 10 reps   Touch downs  6" attempt however patient was unable 2" step x 10 reps     Seated w/MH with ankle weight 8 minutes 8 minutes 8 minutes  Seated w/MH with ankle weight x 8 minutes   TRX squats 20x 20x 20x  TRX squats x 20 reps   Stair training w/ rail & folded RW ---       Car transfers --       Curb training --       Review virtual home assessment ---               STS  2x10 reps low hi lo mat  2x10 reps low hi lo mat with foot block for ROM 2x10 reps low hi lo mat with foot block for ROM  STS 2x10 reps hi lo mat    Viral Ellis retro gait  @#9 x 10 reps   @#9 x 10 reps    Caleb retro gait @#9 x 10 reps                                    Modalities           @home ------- -----

## 2023-03-06 ENCOUNTER — OFFICE VISIT (OUTPATIENT)
Dept: PHYSICAL THERAPY | Facility: CLINIC | Age: 48
End: 2023-03-06

## 2023-03-06 DIAGNOSIS — M25.562 CHRONIC PAIN OF LEFT KNEE: ICD-10-CM

## 2023-03-06 DIAGNOSIS — M17.12 PRIMARY OSTEOARTHRITIS OF LEFT KNEE: Primary | ICD-10-CM

## 2023-03-06 DIAGNOSIS — Z96.652 PRESENCE OF ARTIFICIAL KNEE JOINT, LEFT: ICD-10-CM

## 2023-03-06 DIAGNOSIS — G89.29 CHRONIC PAIN OF LEFT KNEE: ICD-10-CM

## 2023-03-06 NOTE — PROGRESS NOTES
Daily Note     Today's date: 3/6/2023  Patient name: Dayan Talbot  : 1975  MRN: [id number]  Referring provider: Anum Moon DO  Dx:   Encounter Diagnosis     ICD-10-CM    1  Primary osteoarthritis of left knee  M17 12       2  Chronic pain of left knee  M25 562     G89 29       3  Presence of artificial knee joint, left  Z96 652           Start Time: 1615          Subjective: My knee "is feeling better "      Objective: See treatment diary below      Assessment: Tolerated treatment fair  Patient demonstrated fatigue post treatment and would benefit from continued PT    Patient ambulating without assistive device  Plan: Progress treatment as tolerated  Precautions:   Past Medical History:   Diagnosis Date   • Arthritis    • Asthma    • Contact lens/glasses fitting    • Family history of colon cancer     hep C-liver cancer- to colon cancer-father   • GERD (gastroesophageal reflux disease)    • Obesity (BMI 30 0-34  9)            DOS: 2023  Daily Treatment Log:  Date 2/22 2/28 3/6  2/20   Visit # 16 17 19  15   ROM  -3 -- 90   (SUPINE) 0 -- 90   (SUPINE)     Manual L knee PROM/stretching (supine) L knee PROM/stretching (supine) L knee PROM/stretching (supine) L knee PROM/stretching (supine)  L knee PROM/stretching (seated)        patellar mobs Patellar mobs   L knee ROM  -5 -> 81   (SUPINE)   -5 -> 70 degrees (prone)    There Exer   L LE stretching over edge of mat L LE stretching over edge of mat  1 min x 3 reps ----   Bridge 2x10 reps  2x10 reps  2x10 reps w/ hip add ----- ----   LAQ ----  ---- ----     qset ---  hep HEP    Heel slides -----  ---- ------ ----   LAQ -----  ---- hep    SLR -----  ---- ----- ----   B/L HR/TR -----  ------ 20x HR standing with focus on knee extension -----      --- ----- ---   Wall slides   NuStep x 5 minutes -----  ----   bike Rocking seat #8 x 10 min Rocking seat #8 x 10 min  10 reverse  3 forward Seat #9 10 revolutions x 3 sets   BIKE rocking seat #5 x 10 min BIKE rocking seat #8 x 10 min   Step ups 8" x 10 reps  8" x 10 reps  8" step ups/lateral x 10 reps each @rail 8" step ups 10 reps 8" step ups 10 reps   Touch downs  6" attempt however patient was unable 2" step x 10 reps     Seated w/MH with ankle weight 8 minutes 8 minutes 8 minutes  Seated w/MH with ankle weight x 8 minutes   TRX squats 20x 20x 20x  TRX squats x 20 reps   Stair training w/ rail & folded RW ---       Car transfers --       Curb training --       Review virtual home assessment ---               STS  2x10 reps low hi lo mat  2x10 reps low hi lo mat with foot block for ROM 2x10 reps low hi lo mat with foot block for ROM  STS 2x10 reps hi lo mat    Viral Ellis retro gait  @#9 x 10 reps   @#9 x 10 reps    Caleb retro gait @#9 x 10 reps                                    Modalities           @home ------- -----

## 2023-03-08 ENCOUNTER — OFFICE VISIT (OUTPATIENT)
Dept: PHYSICAL THERAPY | Facility: CLINIC | Age: 48
End: 2023-03-08

## 2023-03-08 DIAGNOSIS — M25.562 CHRONIC PAIN OF LEFT KNEE: ICD-10-CM

## 2023-03-08 DIAGNOSIS — Z96.652 PRESENCE OF ARTIFICIAL KNEE JOINT, LEFT: ICD-10-CM

## 2023-03-08 DIAGNOSIS — G89.29 CHRONIC PAIN OF LEFT KNEE: ICD-10-CM

## 2023-03-08 DIAGNOSIS — M17.12 PRIMARY OSTEOARTHRITIS OF LEFT KNEE: Primary | ICD-10-CM

## 2023-03-08 NOTE — PROGRESS NOTES
Daily Note     Today's date: 3/8/2023  Patient name: Valdemar Whitehead  : 1975  MRN: [id number]  Referring provider: Lanette Eldridge DO  Dx:   Encounter Diagnosis     ICD-10-CM    1  Primary osteoarthritis of left knee  M17 12       2  Chronic pain of left knee  M25 562     G89 29       3  Presence of artificial knee joint, left  Z96 [id number]           Start Time: 1535          Subjective: My L leg isn't as swollen today after a day at work  Objective: See treatment diary below      Assessment: Tolerated treatment fair  Patient demonstrated fatigue post treatment, exhibited good technique with therapeutic exercises and would benefit from continued PT      Plan: Progress treatment as tolerated  Precautions:   Past Medical History:   Diagnosis Date   • Arthritis    • Asthma    • Contact lens/glasses fitting    • Family history of colon cancer     hep C-liver cancer- to colon cancer-father   • GERD (gastroesophageal reflux disease)    • Obesity (BMI 30 0-34  9)            DOS: 2023  Daily Treatment Log:  Date 2/22 2/28 3/8  2/20   Visit # 16 17 20  15   ROM  -3 -- 90   (SUPINE) 0 -- 90   (SUPINE)     Manual L knee PROM/stretching (supine) L knee PROM/stretching (supine) L knee PROM/stretching (supine) L knee PROM/stretching (supine)  L knee PROM/stretching (seated)        patellar mobs Patellar mobs   L knee ROM  -5 -> 81   (SUPINE)   -5 -> 70 degrees (prone)    There Exer   L LE stretching over edge of mat L LE stretching over edge of mat  1 min x 3 reps ----   Bridge 2x10 reps  2x10 reps  2x10 reps w/ hip add ----- ----   LAQ ----  ---- ----     qset ---  hep HEP    Heel slides -----  ---- ------ ----   LAQ -----  ---- hep    SLR -----  ---- ----- ----   B/L HR/TR -----  ------ 20x HR standing with focus on knee extension -----      --- ----- ---   Wall slides   ----- -----  ----   bike Rocking seat #8 x 10 min Rocking seat #8 x 10 min  10 reverse  3 forward Seat #8 x 10 min    BIKE rocking seat #5 x 10 min BIKE rocking seat #8 x 10 min   Step ups 8" x 10 reps  8" x 10 reps  8" step ups/lateral x 10 reps each @rail 8" step ups 10 reps 8" step ups 10 reps   Touch downs  6" attempt however patient was unable 2" step x 10 reps     Seated w/MH with ankle weight 8 minutes 8 minutes 8 minutes  Seated w/MH with ankle weight x 8 minutes   TRX squats 20x 20x 20x  TRX squats x 20 reps   Stair training w/ rail & folded RW ---       Car transfers --       Curb training --       Review virtual home assessment ---               STS  2x10 reps low hi lo mat  2x10 reps low hi lo mat with foot block for ROM 2x10 reps low hi lo mat with foot block for ROM  STS 2x10 reps hi lo mat    Viral Ellis retro gait  @#9 x 10 reps   @#9 x 10 reps    Caleb retro gait @#9 x 10 reps                                    Modalities           @home ------- -----

## 2023-03-13 ENCOUNTER — OFFICE VISIT (OUTPATIENT)
Dept: PHYSICAL THERAPY | Facility: CLINIC | Age: 48
End: 2023-03-13

## 2023-03-13 DIAGNOSIS — Z96.652 PRESENCE OF ARTIFICIAL KNEE JOINT, LEFT: ICD-10-CM

## 2023-03-13 DIAGNOSIS — G89.29 CHRONIC PAIN OF LEFT KNEE: Primary | ICD-10-CM

## 2023-03-13 DIAGNOSIS — M25.562 CHRONIC PAIN OF LEFT KNEE: Primary | ICD-10-CM

## 2023-03-13 NOTE — PROGRESS NOTES
Daily Note     Today's date: 3/13/2023  Patient name: Garth Delarosa  : 1975  MRN: [id number]  Referring provider: Shireen Gonzales DO  Dx:   Encounter Diagnosis     ICD-10-CM    1  Chronic pain of left knee  M25 562     G89 29       2  Presence of artificial knee joint, left  Z96 652                      Subjective: "I wish I could walk better "       Objective: See treatment diary below      Assessment: Tolerated treatment fair  Patient demonstrated fatigue post treatment, exhibited good technique with therapeutic exercises and would benefit from continued PT      Plan: Progress treatment as tolerated  Precautions:   Past Medical History:   Diagnosis Date   • Arthritis    • Asthma    • Contact lens/glasses fitting    • Family history of colon cancer     hep C-liver cancer- to colon cancer-father   • GERD (gastroesophageal reflux disease)    • Obesity (BMI 30 0-34  9)            DOS: 2023  Daily Treatment Log:  Date 2/22 2/28 3/8 3/13    Visit # 16 17 20 21    ROM  -3 -- 90   (SUPINE) 0 -- 90   (SUPINE) 0 -> 95 degrees (seated)    Manual L knee PROM/stretching (supine) L knee PROM/stretching (supine) L knee PROM/stretching (supine) L knee PROM/stretching (supine)         patellar mobs    L knee ROM  -5 -> 81   (SUPINE)       There Exer   L LE stretching over edge of mat L LE stretching over edge of mat  1 min x 3 reps ----   Bridge 2x10 reps  2x10 reps  2x10 reps w/ hip add 2x10 reps w/ hip add ----   LAQ ----  ---- ----     qset ---  hep HEP    Heel slides -----  ---- ------ ----   LAQ -----  ---- hep    SLR -----  ---- ----- ----   B/L HR/TR -----  ------ ----- -----           Wall slides   ----- -----  ----   bike Rocking seat #8 x 10 min Rocking seat #8 x 10 min  10 reverse  3 forward Seat #8 x 10 min    BIKE revolutions seat #7 x 10 min    Step ups 8" x 10 reps  8" x 10 reps  8" step ups/lateral x 10 reps each @rail ------    Touch downs  6" attempt however patient was unable 2" step x 10 reps --- Seated w/MH with ankle weight 8 minutes 8 minutes 8 minutes Seated regular chair w/ heel slides     TRX squats 20x 20x 20x 20x    Stair training w/ rail & folded RW ---   Gait training - high knees around facility    Car transfers --       Curb training --       Review virtual home assessment ---               STS  2x10 reps low hi lo mat  2x10 reps low hi lo mat with foot block for ROM 2x10 reps low hi lo mat with foot block for ROM Regular chair 2 x 10 reps     Viral Ellis retro gait  @#9 x 10 reps   @#9 x 10 reps   @#9 x 10 reps                                     Modalities           @home ------- -----

## 2023-03-14 ENCOUNTER — OFFICE VISIT (OUTPATIENT)
Dept: FAMILY MEDICINE CLINIC | Facility: MEDICAL CENTER | Age: 48
End: 2023-03-14

## 2023-03-14 VITALS
TEMPERATURE: 99.8 F | WEIGHT: 230 LBS | BODY MASS INDEX: 38.32 KG/M2 | RESPIRATION RATE: 18 BRPM | DIASTOLIC BLOOD PRESSURE: 98 MMHG | HEIGHT: 65 IN | SYSTOLIC BLOOD PRESSURE: 160 MMHG | HEART RATE: 80 BPM

## 2023-03-14 DIAGNOSIS — Z13.220 SCREENING FOR LIPID DISORDERS: ICD-10-CM

## 2023-03-14 DIAGNOSIS — Z23 IMMUNIZATION DUE: ICD-10-CM

## 2023-03-14 DIAGNOSIS — Z13.1 SCREENING FOR DIABETES MELLITUS: ICD-10-CM

## 2023-03-14 DIAGNOSIS — I10 PRIMARY HYPERTENSION: ICD-10-CM

## 2023-03-14 DIAGNOSIS — Z13.29 THYROID DISORDER SCREEN: ICD-10-CM

## 2023-03-14 DIAGNOSIS — Z00.00 ANNUAL PHYSICAL EXAM: Primary | ICD-10-CM

## 2023-03-14 RX ORDER — LISINOPRIL 10 MG/1
10 TABLET ORAL DAILY
Qty: 30 TABLET | Refills: 1 | Status: SHIPPED | OUTPATIENT
Start: 2023-03-14

## 2023-03-14 NOTE — PROGRESS NOTES
ADULT ANNUAL [address]  [facility]    NAME: Alyce Oliva  AGE: 50 y o  SEX: female  : 1975     DATE: 3/14/2023     Assessment and Plan:   Mammogram scheduled for next month via [facility] per patient  Tdap utd within the past 10 years approx  2016 per patient was given at [facility]    Colonoscopy utd  PCV 20 updated today  Fasting blood work ordered, due in 2 weeks  Start Lisinopril 10 mg daily  Follow up in 4 weeks, sooner if needed  Continue healthy diet and increase exercise as tolerated  Problem List Items Addressed This Visit    None  Visit Diagnoses     Annual physical exam    -  Primary    Primary hypertension        Relevant Medications    lisinopril (ZESTRIL) 10 mg tablet    Other Relevant Orders    Comprehensive metabolic panel    Protein / creatinine ratio, urine    Screening for lipid disorders        Relevant Orders    Lipid panel    Screening for diabetes mellitus        Relevant Orders    Hemoglobin A1C    Immunization due        Relevant Orders    Pneumococcal Conjugate Vaccine 20-valent (Pcv20)    Thyroid disorder screen        Relevant Orders    TSH, 3rd generation with Free T4 reflex          Immunizations and preventive care screenings were discussed with patient today  Appropriate education was printed on patient's after visit summary  Counseling:  Alcohol/drug use: discussed moderation in alcohol intake, the recommendations for healthy alcohol use, and avoidance of illicit drug use  Dental Health: discussed importance of regular tooth brushing, flossing, and dental visits  Injury prevention: discussed safety/seat belts, safety helmets, smoke detectors, carbon dioxide detectors, and smoking near bedding or upholstery  Sexual health: discussed sexually transmitted diseases, partner selection, use of condoms, avoidance of unintended pregnancy, and contraceptive alternatives    Exercise: the importance of regular exercise/physical activity was discussed  Recommend exercise 3-5 times per week for at least 30 minutes  Denies smoking, etoh use socially once per week, denies drug use  BMI Counseling: Body mass index is 38 27 kg/m²  The BMI is above normal  Nutrition recommendations include encouraging healthy choices of fruits and vegetables, moderation in carbohydrate intake and increasing intake of lean protein  Exercise recommendations include moderate physical activity 150 minutes/week and exercising 3-5 times per week  No pharmacotherapy was ordered  Rationale for BMI follow-up plan is due to patient being overweight or obese  Return in 4 weeks (on 4/11/2023) for Recheck HTN  Chief Complaint:     Chief Complaint   Patient presents with   • Annual Exam      History of Present Illness:     Adult Annual Physical   Patient here for a comprehensive physical exam    She is doing well overall  She is still teaching accounting at high school in [place]  Her blood pressure has been increasing over time which she has been monitoring  No prior official dx of HTN or medications  Blood pressure remains elevated in office today and patient agreeable to starting antihypertensive at this time  The patient reports no problems  Diet and Physical Activity  Diet/Nutrition: well balanced diet  Follows diet that was given to her when following with Nutritionist    Exercise: no formal exercise  Post-op 9 weeks left knee replacement  Recovering well, attending PT  Depression Screening  PHQ-2/9 Depression Screening    Little interest or pleasure in doing things: 0 - not at all  Feeling down, depressed, or hopeless: 0 - not at all       General Health  Sleep: sleeps well  Hearing: decreased - bilateral Has seen audiologist in the past, no hearing aids needed at this point  History of hearing loss in family  Vision: goes for regular eye exams, wears glasses and wears contacts  Dental: regular dental visits         /GYN Health    Last menstrual period: 2 months ago, has menses every 3 months due to birth control  Contraceptive method: oral contraceptives  Review of Systems:     Review of Systems   Constitutional: Negative  HENT: Negative  Eyes: Negative  Respiratory: Negative  Cardiovascular: Negative  Gastrointestinal: Negative  Endocrine: Negative  Genitourinary: Negative  Musculoskeletal: Negative  Skin: Negative  Allergic/Immunologic: Negative  Neurological: Negative  Hematological: Negative  Psychiatric/Behavioral: Negative  Past Medical History:     Past Medical History:   Diagnosis Date   • Arthritis    • Asthma    • Contact lens/glasses fitting    • Family history of colon cancer     hep C-liver cancer- to colon cancer-father   • GERD (gastroesophageal reflux disease)    • Obesity (BMI 30 0-34  9)       Past Surgical History:     Past Surgical History:   Procedure Laterality Date   •  SECTION      ,    • DILATION AND CURETTAGE OF UTERUS      miscarriage   • EGD     • VA ARTHRP KNE CONDYLE&PLATU MEDIAL&LAT COMPARTMENTS Left 2023    Procedure: ARTHROPLASTY KNEE TOTAL W ROBOT - SAME DAY - PRESS FIT - LEFT;  Surgeon: Jud Brown DO;  Location: [address] [place] [address];  Service: Orthopedics   • ROTATOR CUFF REPAIR Right    • SHOULDER SURGERY     • TONSILLECTOMY     • TUBAL LIGATION        Social History:     Social History     Socioeconomic History   • Marital status:      Spouse name: None   • Number of children: None   • Years of education: None   • Highest education level: None   Occupational History   • None   Tobacco Use   • Smoking status: Never   • Smokeless tobacco: Never   Vaping Use   • Vaping Use: Never used   Substance and Sexual Activity   • Alcohol use:  Yes     Alcohol/week: 2 0 standard drinks     Types: 2 Standard drinks or equivalent per week     Comment: social   • Drug use: Never   • Sexual activity: Yes     Partners: Male Birth control/protection: OCP   Other Topics Concern   • None   Social History Narrative   • None     Social Determinants of Health     Financial Resource Strain: Not on file   Food Insecurity: Not on file   Transportation Needs: Not on file   Physical Activity: Not on file   Stress: Not on file   Social Connections: Not on file   Intimate Partner Violence: Not on file   Housing Stability: Not on file      Family History:     Family History   Problem Relation Age of Onset   • Heart disease Mother         pacemaker   • Coronary artery disease Mother    • Hypertension Mother         pulmonary   • Hearing loss Mother    • Colon cancer Father            • Liver cancer Father    • Hepatitis Father    • Cancer Father          - colon cancer      Current Medications:     Current Outpatient Medications   Medication Sig Dispense Refill   • acetaminophen (TYLENOL) 325 mg tablet Take 3 tablets (975 mg total) by mouth every 8 (eight) hours  0   • cetirizine (ZyrTEC) 10 mg tablet Take 10 mg by mouth daily     • famotidine (PEPCID) 20 mg tablet Take 1 tablet (20 mg total) by mouth 2 (two) times a day 60 tablet 11   • lansoprazole (PREVACID) 30 mg capsule as needed       • Levonorgest-Eth Estrad 91-Day 0 15-0 03 &0 01 MG TABS Take 1 tablet by mouth every morning    2   • lisinopril (ZESTRIL) 10 mg tablet Take 1 tablet (10 mg total) by mouth daily 30 tablet 1   • montelukast (SINGULAIR) 10 mg tablet Take 10 mg by mouth daily     • pimecrolimus (ELIDEL) 1 % cream MIX WITH KETOCONAZOLE CREAM BEFORE APPLYING TO FACE TWICE DAILY     • fluticasone-vilanterol (Breo Ellipta) 100-25 mcg/inh inhaler Inhale 1 puff daily Rinse mouth after use  60 blister 3     No current facility-administered medications for this visit        Allergies:     No Known Allergies   Physical Exam:     /98 (BP Location: Left arm, Patient Position: Sitting, Cuff Size: Large)   Pulse 80   Temp 99 8 °F (37 7 °C)   Resp 18   Ht 5' 5" (1 651 m) Wt 104 kg (230 lb)   BMI 38 27 kg/m²     Physical Exam  Vitals and nursing note reviewed  Constitutional:       General: She is not in acute distress  Appearance: Normal appearance  She is well-developed  She is obese  She is not ill-appearing  HENT:      Head: Normocephalic and atraumatic  Right Ear: Tympanic membrane, ear canal and external ear normal       Left Ear: Tympanic membrane, ear canal and external ear normal       Nose: Nose normal       Mouth/Throat:      Mouth: Mucous membranes are moist       Pharynx: Oropharynx is clear  Eyes:      General:         Right eye: No discharge  Left eye: No discharge  Conjunctiva/sclera: Conjunctivae normal       Pupils: Pupils are equal, round, and reactive to light  Cardiovascular:      Rate and Rhythm: Normal rate and regular rhythm  Pulses: Normal pulses  Heart sounds: Normal heart sounds  No murmur heard  Pulmonary:      Effort: Pulmonary effort is normal  No respiratory distress  Breath sounds: Normal breath sounds  Abdominal:      General: Abdomen is flat  Bowel sounds are normal       Palpations: Abdomen is soft  Tenderness: There is no abdominal tenderness  Musculoskeletal:         General: No swelling  Normal range of motion  Cervical back: Normal range of motion and neck supple  Skin:     General: Skin is warm and dry  Capillary Refill: Capillary refill takes less than 2 seconds  Neurological:      General: No focal deficit present  Mental Status: She is alert and oriented to person, place, and time  Mental status is at baseline  Psychiatric:         Mood and Affect: Mood normal          Behavior: Behavior normal          Thought Content:  Thought content normal           LEONEL Amaya  [facility]

## 2023-03-15 ENCOUNTER — OFFICE VISIT (OUTPATIENT)
Dept: PHYSICAL THERAPY | Facility: CLINIC | Age: 48
End: 2023-03-15

## 2023-03-15 DIAGNOSIS — M17.12 PRIMARY OSTEOARTHRITIS OF LEFT KNEE: ICD-10-CM

## 2023-03-15 DIAGNOSIS — Z96.652 PRESENCE OF ARTIFICIAL KNEE JOINT, LEFT: ICD-10-CM

## 2023-03-15 DIAGNOSIS — G89.29 CHRONIC PAIN OF LEFT KNEE: Primary | ICD-10-CM

## 2023-03-15 DIAGNOSIS — M25.562 CHRONIC PAIN OF LEFT KNEE: Primary | ICD-10-CM

## 2023-03-15 NOTE — PROGRESS NOTES
Daily Note     Today's date: 3/15/2023  Patient name: Dayan Talbot  : 1975  MRN: [id number]  Referring provider: Anum Moon DO  Dx:   Encounter Diagnosis     ICD-10-CM    1  Chronic pain of left knee  M25 562     G89 29       2  Presence of artificial knee joint, left  Z96 652       3  Primary osteoarthritis of left knee  M17 12                      Subjective: Patient reports she feels her ROM has improved however notes she is still frustrated with the stiffness present  Objective: See treatment diary below      Assessment: Tolerated treatment fair  Patient demonstrated fatigue post treatment, exhibited good technique with therapeutic exercises and would benefit from continued PT      Plan: Progress treatment as tolerated  Precautions:   Past Medical History:   Diagnosis Date   • Arthritis    • Asthma    • Contact lens/glasses fitting    • Family history of colon cancer     hep C-liver cancer- to colon cancer-father   • GERD (gastroesophageal reflux disease)    • Obesity (BMI 30 0-34  9)            DOS: 2023  Daily Treatment Log:  Date 2/22 2/28 3/8 3/13 3/15   Visit # 16 17 20 21 22   ROM  -3 -- 90   (SUPINE) 0 -- 90   (SUPINE) 0 -> 95 degrees (seated) 0 -> 95 degrees supine   Manual L knee PROM/stretching (supine) L knee PROM/stretching (supine) L knee PROM/stretching (supine) L knee PROM/stretching (supine)  L knee PROM/stretching (supine/prone)        patellar mobs patellar mobs   L knee ROM  -5 -> 81   (SUPINE)       There Exer   L LE stretching over edge of mat L LE stretching over edge of mat  1 min x 3 reps L LE stretching over edge of mat  1 min x 3 reps   Bridge 2x10 reps  2x10 reps  2x10 reps w/ hip add 2x10 reps w/ hip add 2x10 reps w/ hip add   LAQ ----  ---- ----     qset ---  hep HEP    Heel slides -----  ---- ------ ----   LAQ -----  ---- hep    SLR -----  ---- ----- ----   B/L HR/TR -----  ------ ----- -----           Wall slides   ----- -----  ----   bike Rocking seat #8 x 10 min Rocking seat #8 x 10 min  10 reverse  3 forward Seat #8 x 10 min    BIKE revolutions seat #7 x 10 min BIKE revolutions seat #7 x 10 min   Step ups 8" x 10 reps  8" x 10 reps  8" step ups/lateral x 10 reps each @rail ------ 8" step ups/lateral x 10 reps each @rail   Touch downs  6" attempt however patient was unable 2" step x 10 reps --- 4" step x 10 reps   Seated w/MH with ankle weight 8 minutes 8 minutes 8 minutes Seated regular chair w/ heel slides  Seated regular chair w/ heel slides and PT OP   TRX squats 20x 20x 20x 20x 20x   Stair training w/ rail & folded RW ---   Gait training - high knees around facility    Car transfers --       Curb training --       Review virtual home assessment ---               STS  2x10 reps low hi lo mat  2x10 reps low hi lo mat with foot block for ROM 2x10 reps low hi lo mat with foot block for ROM Regular chair 2 x 10 reps  Regular chair 2 x 10 reps            Viral Ellis retro gait  @#9 x 10 reps   @#9 x 10 reps   @#9 x 10 reps                                     Modalities           @home ------- -----

## 2023-03-20 ENCOUNTER — OFFICE VISIT (OUTPATIENT)
Dept: PHYSICAL THERAPY | Facility: CLINIC | Age: 48
End: 2023-03-20

## 2023-03-20 DIAGNOSIS — Z96.652 PRESENCE OF ARTIFICIAL KNEE JOINT, LEFT: ICD-10-CM

## 2023-03-20 DIAGNOSIS — G89.29 CHRONIC PAIN OF LEFT KNEE: Primary | ICD-10-CM

## 2023-03-20 DIAGNOSIS — M25.562 CHRONIC PAIN OF LEFT KNEE: Primary | ICD-10-CM

## 2023-03-20 DIAGNOSIS — M17.12 PRIMARY OSTEOARTHRITIS OF LEFT KNEE: ICD-10-CM

## 2023-03-20 NOTE — PROGRESS NOTES
Daily Note     Today's date: 3/20/2023  Patient name: Adam Forte  : 1975  MRN: [id number]  Referring provider: Estela Christie DO  Dx:   Encounter Diagnosis     ICD-10-CM    1  Chronic pain of left knee  M25 562     G89 29       2  Primary osteoarthritis of left knee  M17 12       3  Presence of artificial knee joint, left  Z96 652           Start Time: 1720          Subjective: My knee is very stiff today  The elevators were broken at work & then we had a fire drill  Objective: See treatment diary below      Assessment: Tolerated treatment fair  Patient demonstrated fatigue post treatment and would benefit from continued PT      Plan: Progress treatment as tolerated  Precautions:   Past Medical History:   Diagnosis Date   • Arthritis    • Asthma    • Contact lens/glasses fitting    • Family history of colon cancer     hep C-liver cancer- to colon cancer-father   • GERD (gastroesophageal reflux disease)    • Obesity (BMI 30 0-34  9)            DOS: 2023  Daily Treatment Log:  Date 2/22 2/28 3/8 3/13 3/20   Visit # 16 17 20 21 23   ROM  -3 -- 90   (SUPINE) 0 -- 90   (SUPINE) 0 -> 95 degrees (seated) 0 -> 95 degrees supine   Manual L knee PROM/stretching (supine) L knee PROM/stretching (supine) L knee PROM/stretching (supine) L knee PROM/stretching (supine)  L knee PROM/stretching (supine/prone)        patellar mobs patellar mobs   L knee ROM  -5 -> 81   (SUPINE)       There Exer   L LE stretching over edge of mat L LE stretching over edge of mat  1 min x 3 reps L LE stretching over edge of mat  1 min x 3 reps   Bridge 2x10 reps  2x10 reps  2x10 reps w/ hip add 2x10 reps w/ hip add 2x10 reps w/ hip add   LAQ ----  ---- ----     qset ---  hep HEP    Heel slides -----  ---- ------ ----   LAQ -----  ---- hep    SLR -----  ---- ----- ----   B/L HR/TR -----  ------ ----- -----           Wall slides   ----- -----  ----   bike Rocking seat #8 x 10 min Rocking seat #8 x 10 min  10 reverse  3 forward Seat #8 x 10 min    BIKE revolutions seat #7 x 10 min BIKE revolutions seat #7 x 10 min   Step ups 8" x 10 reps  8" x 10 reps  8" step ups/lateral x 10 reps each @rail ------ 8" step ups/lateral x 10 reps each @rail   Touch downs  6" attempt however patient was unable 2" step x 10 reps --- 4" step x 10 reps   Seated w/MH with ankle weight 8 minutes 8 minutes 8 minutes Seated regular chair w/ heel slides  Seated regular chair w/ heel slides and PT OP   TRX squats 20x 20x 20x 20x 20x   Stair training w/ rail & folded RW ---   Gait training - high knees around facility    Car transfers --       Curb training --       Review virtual home assessment ---               STS  2x10 reps low hi lo mat  2x10 reps low hi lo mat with foot block for ROM 2x10 reps low hi lo mat with foot block for ROM Regular chair 2 x 10 reps  Regular chair 2 x 10 reps            Viral Ellis retro gait  @#9 x 10 reps   @#9 x 10 reps   @#9 x 10 reps  ----                                   Modalities           @home ------- -----

## 2023-03-23 ENCOUNTER — APPOINTMENT (OUTPATIENT)
Dept: PHYSICAL THERAPY | Facility: CLINIC | Age: 48
End: 2023-03-23

## 2023-03-28 ENCOUNTER — OFFICE VISIT (OUTPATIENT)
Dept: PHYSICAL THERAPY | Facility: CLINIC | Age: 48
End: 2023-03-28

## 2023-03-28 DIAGNOSIS — Z96.652 PRESENCE OF ARTIFICIAL KNEE JOINT, LEFT: ICD-10-CM

## 2023-03-28 DIAGNOSIS — M17.12 PRIMARY OSTEOARTHRITIS OF LEFT KNEE: ICD-10-CM

## 2023-03-28 DIAGNOSIS — G89.29 CHRONIC PAIN OF LEFT KNEE: Primary | ICD-10-CM

## 2023-03-28 DIAGNOSIS — M25.562 CHRONIC PAIN OF LEFT KNEE: Primary | ICD-10-CM

## 2023-03-28 NOTE — PROGRESS NOTES
"Daily Note     Today's date: 3/28/2023  Patient name: Aniyah Selby  : 1975  MRN: [id number]  Referring provider: Tamie Mendoza DO  Dx:   Encounter Diagnosis     ICD-10-CM    1  Chronic pain of left knee  M25 562     G89 29       2  Primary osteoarthritis of left knee  M17 12       3  Presence of artificial knee joint, left  Z96 652                      Subjective: Patient reports that she feels a bit better, noting that she has been walking better and states she can negotiate stairs reciprocally at this time  Objective: See treatment diary below      Assessment: Tolerated treatment fair  Patient demonstrated fatigue post treatment and would benefit from continued PT      Plan: Progress treatment as tolerated  Precautions:   Past Medical History:   Diagnosis Date   • Arthritis    • Asthma    • Contact lens/glasses fitting    • Family history of colon cancer     hep C-liver cancer- to colon cancer-father   • GERD (gastroesophageal reflux disease)    • Obesity (BMI 30 0-34  9)            DOS: 2023  Daily Treatment Log:  Date 03/22 2023 3/8 3/13 3/20   Visit # 16 17 20 21 23   ROM   0 -- 90   (SUPINE) 0 -> 95 degrees (seated) 0 -> 95 degrees supine   Manual L knee PROM/stretching (supine) TC L knee PROM/stretching (supine) L knee PROM/stretching (supine)  L knee PROM/stretching (supine/prone)        patellar mobs patellar mobs   L knee ROM  -5 -> 81   (SUPINE)       There Exer  L LE stretching over edge of mat  1 min x 3 reps L LE stretching over edge of mat L LE stretching over edge of mat  1 min x 3 reps L LE stretching over edge of mat  1 min x 3 reps   Bridge 2x10 reps   2x10 reps w/ hip add 2x10 reps w/ hip add 2x10 reps w/ hip add   LAQ ----  ---- ----     qset ---  hep HEP    Heel slides -----  ---- ------ ----   LAQ -----  ---- hep    SLR -----  ---- ----- ----   B/L HR/TR -----  ------ ----- -----   Prostretch  20\" x 3      Wall slides   ----- -----  ----   bike Rocking seat #8 x 10 " "min Rocking and revolutions seat #7 x 10 min Seat #8 x 10 min    BIKE revolutions seat #7 x 10 min BIKE revolutions seat #7 x 10 min   Step ups 8\" x 10 reps  8\" step ups/lateral x 10 reps each @rail 8\" step ups/lateral x 10 reps each @rail ------ 8\" step ups/lateral x 10 reps each @rail   Touch downs  6\" step 2x10 2\" step x 10 reps --- 4\" step x 10 reps   Seated w/MH with ankle weight 8 minutes 8 minutes with focus on knee ROM 8 minutes Seated regular chair w/ heel slides  Seated regular chair w/ heel slides and PT OP   TRX squats 20x 20x with focus on knee flexion 20x 20x 20x   Stair training w/ rail & folded RW ---   Gait training - high knees around facility    Car transfers --       Curb training --       Review virtual home assessment ---               STS  2x10 reps low hi lo mat  2x10 reps low hi lo mat 2x10 reps low hi lo mat with foot block for ROM Regular chair 2 x 10 reps  Regular chair 2 x 10 reps            Viral Ellis retro gait  @#9 x 10 reps  #18 5 15x retro @#9 x 10 reps   @#9 x 10 reps                                    Modalities           @home ------- -----                                    "

## 2023-03-30 ENCOUNTER — OFFICE VISIT (OUTPATIENT)
Dept: PHYSICAL THERAPY | Facility: CLINIC | Age: 48
End: 2023-03-30

## 2023-03-30 DIAGNOSIS — G89.29 CHRONIC PAIN OF LEFT KNEE: Primary | ICD-10-CM

## 2023-03-30 DIAGNOSIS — M17.12 PRIMARY OSTEOARTHRITIS OF LEFT KNEE: ICD-10-CM

## 2023-03-30 DIAGNOSIS — M25.562 CHRONIC PAIN OF LEFT KNEE: Primary | ICD-10-CM

## 2023-03-30 DIAGNOSIS — Z96.652 PRESENCE OF ARTIFICIAL KNEE JOINT, LEFT: ICD-10-CM

## 2023-03-30 NOTE — PROGRESS NOTES
"Daily Note     Today's date: 3/30/2023  Patient name: Tanya Osborne  : 1975  MRN: [id number]  Referring provider: Adriane Berry DO  Dx:   Encounter Diagnosis     ICD-10-CM    1  Chronic pain of left knee  M25 562     G89 29       2  Primary osteoarthritis of left knee  M17 12       3  Presence of artificial knee joint, left  Z96 652           Start Time: 1528          Subjective: My knee is very sore  Objective: See treatment diary below      Assessment: Tolerated treatment fair  Patient demonstrated fatigue post treatment, exhibited good technique with therapeutic exercises and would benefit from continued PT      Plan: Progress treatment as tolerated  Precautions:   Past Medical History:   Diagnosis Date   • Arthritis    • Asthma    • Contact lens/glasses fitting    • Family history of colon cancer     hep C-liver cancer- to colon cancer-father   • GERD (gastroesophageal reflux disease)    • Obesity (BMI 30 0-34  9)            DOS: 2023  Daily Treatment Log:  Date 03/22 2023 3/30  3/20   Visit # 16 17   23   ROM   0 -> 95  (SUPINE)  0 -> 95 degrees supine   Manual L knee PROM/stretching (supine) TC L knee PROM/stretching (supine & prone)  L knee PROM/stretching (supine/prone)       Patellar mobs  patellar mobs   L knee ROM  -5 -> 81   (SUPINE)       There Exer  L LE stretching over edge of mat  1 min x 3 reps L LE stretching over edge of mat  L LE stretching over edge of mat  1 min x 3 reps   Bridge 2x10 reps     2x10 reps w/ hip add   LAQ ----  ---- ----     qset ---  hep HEP    Heel slides -----  ---- ------ ----   LAQ -----  ---- hep    SLR -----  ---- ----- ----   B/L HR/TR -----  ------ ----- -----   Prostretch  20\" x 3 3x20 sec hold      Wall slides   ----- -----  ----   bike Rocking seat #8 x 10 min Rocking and revolutions seat #7 x 10 min Seat #7 -> #6 x 10 min     BIKE revolutions seat #7 x 10 min   Step ups 8\" x 10 reps  8\" step ups/lateral x 10 reps each @rail 8\" step " "ups/lateral x 10 reps each @rail ------ 8\" step ups/lateral x 10 reps each @rail   Touch downs  6\" step 2x10 ----- --- 4\" step x 10 reps   Seated w/MH with ankle weight 8 minutes 8 minutes with focus on knee ROM 8 minutes   Seated regular chair w/ heel slides and PT OP   TRX squats 20x 20x with focus on knee flexion 10x  20x   Stair training w/ rail & folded RW ---       Car transfers --       Curb training --       Review virtual home assessment ---               STS  2x10 reps low hi lo mat  2x10 reps low hi lo mat 10 reps regular chair with foot block for ROM  Regular chair 2 x 10 reps            Viral Ellis retro gait  @#9 x 10 reps  #18 5 15x retro @#18 x 10 reps                                      Modalities           @home ------- -----                                      "

## 2023-04-03 ENCOUNTER — OFFICE VISIT (OUTPATIENT)
Dept: PHYSICAL THERAPY | Facility: CLINIC | Age: 48
End: 2023-04-03

## 2023-04-03 DIAGNOSIS — M25.562 CHRONIC PAIN OF LEFT KNEE: Primary | ICD-10-CM

## 2023-04-03 DIAGNOSIS — G89.29 CHRONIC PAIN OF LEFT KNEE: Primary | ICD-10-CM

## 2023-04-03 DIAGNOSIS — M17.12 PRIMARY OSTEOARTHRITIS OF LEFT KNEE: ICD-10-CM

## 2023-04-03 DIAGNOSIS — Z96.652 PRESENCE OF ARTIFICIAL KNEE JOINT, LEFT: ICD-10-CM

## 2023-04-03 NOTE — PROGRESS NOTES
"Daily Note     Today's date: 4/3/2023  Patient name: Maycol Vasquez  : 1975  MRN: [id number]  Referring provider: Leopoldo Canada, DO  Dx:   Encounter Diagnosis     ICD-10-CM    1  Chronic pain of left knee  M25 562     G89 29       2  Primary osteoarthritis of left knee  M17 12       3  Presence of artificial knee joint, left  Z96 [id number]           Start Time: 1525          Subjective: My L knee feels more stable  I'm walking faster & steps are easier  Objective: See treatment diary below      Assessment: Tolerated treatment fair  Patient demonstrated fatigue post treatment, exhibited good technique with therapeutic exercises and would benefit from continued PT      Plan: Progress treatment as tolerated  Precautions:   Past Medical History:   Diagnosis Date   • Arthritis    • Asthma    • Contact lens/glasses fitting    • Family history of colon cancer     hep C-liver cancer- to colon cancer-father   • GERD (gastroesophageal reflux disease)    • Obesity (BMI 30 0-34  9)            DOS: 2023  Daily Treatment Log:  Date 03/22 2023 4/3     Visit #        ROM   0 -> 95  (SUPINE)     Manual L knee PROM/stretching (supine) TC L knee PROM/stretching (supine)  L knee PROM/stretching (supine/prone)       Patellar mobs  patellar mobs   L knee ROM  -5 -> 81   (SUPINE)       There Exer  L LE stretching over edge of mat  1 min x 3 reps L LE stretching over edge of mat  L LE stretching over edge of mat  1 min x 3 reps   Bridge 2x10 reps     2x10 reps w/ hip add   LAQ ----  ---- ----     qset ---  hep HEP    Heel slides -----  ---- ------ ----   LAQ -----  ---- hep    SLR -----  ---- ----- ----   B/L HR/TR -----  ------ ----- -----   Prostretch  20\" x 3 3x20 sec hold      Wall slides   ----- -----  ----   bike Rocking seat #8 x 10 min Rocking and revolutions seat #7 x 10 min Seat #7 -> #6 x 10 min     BIKE revolutions seat #7 x 10 min   Step ups 8\" x 10 reps  8\" step ups/lateral x 10 reps each @rail 8\" step " "ups/lateral x 10 reps each @rail ------ 8\" step ups/lateral x 10 reps each @rail   Touch downs  6\" step 2x10 ----- --- 4\" step x 10 reps   Seated w/MH with ankle weight 8 minutes 8 minutes with focus on knee ROM 8 minutes   Seated regular chair w/ heel slides and PT OP   TRX squats 20x 20x with focus on knee flexion 10x w/ focus on knee flexion  20x   Stair training w/ rail & folded RW ---       Car transfers --       Curb training --       Review virtual home assessment ---               STS  2x10 reps low hi lo mat  2x10 reps low hi lo mat 2x10 reps regular chair with foot block for ROM  Regular chair 2 x 10 reps            Viral Ellis retro gait  @#9 x 10 reps  #18 5 15x retro @#18 x 10 reps                                      Modalities           @home ------- -----                                        "

## 2023-04-06 ENCOUNTER — APPOINTMENT (OUTPATIENT)
Dept: RADIOLOGY | Facility: CLINIC | Age: 48
End: 2023-04-06

## 2023-04-06 ENCOUNTER — OFFICE VISIT (OUTPATIENT)
Dept: OBGYN CLINIC | Facility: CLINIC | Age: 48
End: 2023-04-06

## 2023-04-06 VITALS — TEMPERATURE: 98 F | HEART RATE: 98 BPM | SYSTOLIC BLOOD PRESSURE: 124 MMHG | DIASTOLIC BLOOD PRESSURE: 68 MMHG

## 2023-04-06 DIAGNOSIS — Z47.1 AFTERCARE FOLLOWING LEFT KNEE JOINT REPLACEMENT SURGERY: ICD-10-CM

## 2023-04-06 DIAGNOSIS — Z96.652 AFTERCARE FOLLOWING LEFT KNEE JOINT REPLACEMENT SURGERY: ICD-10-CM

## 2023-04-06 DIAGNOSIS — Z96.652 STATUS POST TOTAL KNEE REPLACEMENT NOT USING CEMENT, LEFT: Primary | ICD-10-CM

## 2023-04-06 DIAGNOSIS — Z96.652 STATUS POST TOTAL KNEE REPLACEMENT NOT USING CEMENT, LEFT: ICD-10-CM

## 2023-04-06 LAB — HBA1C MFR BLD HPLC: 5.7 %

## 2023-04-06 RX ORDER — AMOXICILLIN 500 MG/1
2000 TABLET, FILM COATED ORAL
Qty: 4 TABLET | Refills: 1 | Status: SHIPPED | OUTPATIENT
Start: 2023-04-06 | End: 2023-04-07

## 2023-04-06 NOTE — PROGRESS NOTES
Assessment/Plan:  1  Status post total knee replacement not using cement, left  XR knee 3 vw left non injury    amoxicillin (AMOXIL) 500 MG tablet      2  Aftercare following left knee joint replacement surgery          Scribe Attestation    I,:  Vania Archibald am acting as a scribe while in the presence of the attending physician :       I,:  Spike Christine, DO personally performed the services described in this documentation    as scribed in my presence :         Alyce on examination and review of the x-rays of the left knee is doing quite well  Her knee implants are well-positioned without evidence of lucency or failure  I did explain that she should continue to crease her activities of daily living to tolerance  I do not have any specific restrictions for her  I did also encourage her to continue with physical therapy and promote range of motion of her knee of flexion and extension  She does require predental antibiotics for the remainder of her lifetime  She is to allow approximately 2 days of notice prior to undergoing any dental procedure to be provided antibiotics  I would like her to follow-up with me in 9 months time for repeat clinical evaluation and repeat x-rays  She verbalized understanding and had no further questions  Subjective: 3-month follow-up left total knee arthroplasty     Patient ID: Lucy Loomis is a very pleasant 50 y o  female who presents today for follow-up evaluation of her left knee  She is 3 months status post left total knee arthroplasty with press-fit prosthesis  She is doing very well and states that she has been working physical therapy to improve her range of motion  She states that she was a daily living are not significantly impeded  She does feel that she is mildly weak into the left lower extremity but does note improvements with therapy  She does remark some paresthesias laterally at the incision    However notes that she does not experience dense paresthesias into the left lower extremity  Overall, she is quite happy with her progress up to this point  Review of Systems   Constitutional: Positive for activity change  Negative for chills, fever and unexpected weight change  HENT: Negative for hearing loss, nosebleeds and sore throat  Eyes: Negative for pain, redness and visual disturbance  Respiratory: Negative for cough, shortness of breath and wheezing  Cardiovascular: Negative for chest pain, palpitations and leg swelling  Gastrointestinal: Negative for abdominal pain, nausea and vomiting  Endocrine: Negative for polydipsia and polyuria  Genitourinary: Negative for dysuria and hematuria  Musculoskeletal: Positive for joint swelling  Negative for arthralgias and myalgias  See HPI   Skin: Negative for rash and wound  Neurological: Negative for dizziness, numbness and headaches  Psychiatric/Behavioral: Negative for decreased concentration and suicidal ideas  The patient is not nervous/anxious  Past Medical History:   Diagnosis Date   • Arthritis    • Asthma    • Contact lens/glasses fitting    • Family history of colon cancer     hep C-liver cancer- to colon cancer-father   • GERD (gastroesophageal reflux disease)    • Obesity (BMI 30 0-34  9)        Past Surgical History:   Procedure Laterality Date   •  SECTION      2008   • DILATION AND CURETTAGE OF UTERUS      miscarriage   • EGD     • GA ARTHRP KNE CONDYLE&PLATU MEDIAL&LAT COMPARTMENTS Left 2023    Procedure: ARTHROPLASTY KNEE TOTAL W ROBOT - SAME DAY - PRESS FIT - LEFT;  Surgeon: Raj Butcher DO;  Location: [address] [place] [address];  Service: Orthopedics   • ROTATOR CUFF REPAIR Right    • SHOULDER SURGERY     • TONSILLECTOMY     • TUBAL LIGATION         Family History   Problem Relation Age of Onset   • Heart disease Mother         pacemaker   • Coronary artery disease Mother    • Hypertension Mother         pulmonary   • Hearing loss Mother    • Colon cancer Father            • Liver cancer Father    • Hepatitis Father    • Cancer Father          - colon cancer       Social History     Occupational History   • Not on file   Tobacco Use   • Smoking status: Never   • Smokeless tobacco: Never   Vaping Use   • Vaping Use: Never used   Substance and Sexual Activity   • Alcohol use: Yes     Alcohol/week: 2 0 standard drinks     Types: 2 Standard drinks or equivalent per week     Comment: social   • Drug use: Never   • Sexual activity: Yes     Partners: Male     Birth control/protection: OCP         Current Outpatient Medications:   •  amoxicillin (AMOXIL) 500 MG tablet, Take 4 tablets (2,000 mg total) by mouth 60 minutes pre-procedure for 1 day, Disp: 4 tablet, Rfl: 1  •  acetaminophen (TYLENOL) 325 mg tablet, Take 3 tablets (975 mg total) by mouth every 8 (eight) hours, Disp: , Rfl: 0  •  cetirizine (ZyrTEC) 10 mg tablet, Take 10 mg by mouth daily, Disp: , Rfl:   •  famotidine (PEPCID) 20 mg tablet, Take 1 tablet (20 mg total) by mouth 2 (two) times a day, Disp: 60 tablet, Rfl: 11  •  fluticasone-vilanterol (Breo Ellipta) 100-25 mcg/inh inhaler, Inhale 1 puff daily Rinse mouth after use , Disp: 60 blister, Rfl: 3  •  lansoprazole (PREVACID) 30 mg capsule, as needed  , Disp: , Rfl:   •  Levonorgest-Eth Estrad -Day 0 15-0 03 &0 01 MG TABS, Take 1 tablet by mouth every morning  , Disp: , Rfl: 2  •  lisinopril (ZESTRIL) 10 mg tablet, Take 1 tablet (10 mg total) by mouth daily, Disp: 30 tablet, Rfl: 1  •  montelukast (SINGULAIR) 10 mg tablet, Take 10 mg by mouth daily, Disp: , Rfl:   •  pimecrolimus (ELIDEL) 1 % cream, MIX WITH KETOCONAZOLE CREAM BEFORE APPLYING TO FACE TWICE DAILY, Disp: , Rfl:     No Known Allergies    Objective:  Vitals:    23 1542   BP: 124/68   Pulse: 98   Temp: 98 °F (36 7 °C)       There is no height or weight on file to calculate BMI  Left Knee Exam     Tenderness   The patient is experiencing no tenderness       Range of Motion Left knee extension: -2    Flexion: 110     Tests   Varus: negative Valgus: negative  Drawer:  Anterior - negative     Posterior - negative    Other   Erythema: absent  Scars: present  Sensation: normal  Pulse: present  Effusion: effusion (scant) present    Comments:  Vertical midline incision is well-healed without erythema  Patella tracks along the midline of the knee  Observations   Left Knee   Positive for effusion (scant)  Physical Exam  Vitals and nursing note reviewed  Constitutional:       Appearance: Normal appearance  HENT:      Head: Normocephalic and atraumatic  Right Ear: External ear normal       Left Ear: External ear normal       Nose: Nose normal    Eyes:      General:         Right eye: No discharge  Left eye: No discharge  Extraocular Movements: Extraocular movements intact  Conjunctiva/sclera: Conjunctivae normal    Cardiovascular:      Rate and Rhythm: Normal rate  Pulses: Normal pulses  Pulmonary:      Effort: Pulmonary effort is normal  No respiratory distress  Musculoskeletal:      Cervical back: Normal range of motion and neck supple  Left knee: Effusion (scant) present  Comments: As noted in Ortho exam   Skin:     General: Skin is warm and dry  Neurological:      Mental Status: She is alert and oriented to person, place, and time  I have personally reviewed pertinent films in PACS  X-rays of the left knee demonstrate a stable and well position of the total knee arthroplasty prosthesis without evidence of failure  This document was created using speech voice recognition software  Grammatical errors, random word insertions, pronoun errors, and incomplete sentences are an occasional consequence of this system due to software limitations, ambient noise, and hardware issues     Any formal questions or concerns about content, text, or information contained within the body of this dictation should be directly addressed to the provider for clarification

## 2023-04-06 NOTE — LETTER
April 7, 2023     Patient: Brenden Velarde  YOB: 1975  Date of Visit: 4/6/2023      To Whom it May Concern:    Brenden Velarde is under my professional care  Sonya Marie was seen in my office on 4/6/2023  Alyce is cleared for dental procedures  She does require antibiotics 1 hour prior to any appointment  We recommend Amoxicillin take 4 tablets (2,000 mg total) by mouth 60 minutes pre-procedure  If you have any questions or concerns, please don't hesitate to call           Sincerely,          Nemo Blackburn, DO

## 2023-04-18 DIAGNOSIS — J45.909 ASTHMA, UNSPECIFIED ASTHMA SEVERITY, UNSPECIFIED WHETHER COMPLICATED, UNSPECIFIED WHETHER PERSISTENT: ICD-10-CM

## 2023-04-19 PROBLEM — I10 PRIMARY HYPERTENSION: Status: ACTIVE | Noted: 2023-04-19

## 2023-04-24 RX ORDER — FLUTICASONE FUROATE AND VILANTEROL TRIFENATATE 100; 25 UG/1; UG/1
POWDER RESPIRATORY (INHALATION)
Qty: 60 EACH | Refills: 3 | Status: SHIPPED | OUTPATIENT
Start: 2023-04-24

## 2023-06-02 ENCOUNTER — TELEPHONE (OUTPATIENT)
Dept: PULMONOLOGY | Facility: CLINIC | Age: 48
End: 2023-06-02

## 2023-06-02 NOTE — TELEPHONE ENCOUNTER
I darrick pt and offered her a 11 am sick visit in [place]  Pt is unable to to that time  She said she will probably just go back to urgent care after work   She was prescribed  Doxy and prednisone when she was at urgent care

## 2023-06-02 NOTE — TELEPHONE ENCOUNTER
Patient called stating that she has bronchitis and is coughing a lot  She went to an urgent care but is not feeling any relief  She wanted to come in for a sick visit today but there is no availability  Please advise

## 2023-06-05 NOTE — TELEPHONE ENCOUNTER
Pt scheduled for a sick visit on Wednesday with Dr Liliana Cespedes   I offered her a appt with nalini tomorrow morning but she declined

## 2023-06-05 NOTE — TELEPHONE ENCOUNTER
Patient called and lvm stating she has bronchitis, she finished the steriods, and she is not getting any better   Please advise

## 2023-06-07 ENCOUNTER — OFFICE VISIT (OUTPATIENT)
Dept: PULMONOLOGY | Facility: CLINIC | Age: 48
End: 2023-06-07
Payer: COMMERCIAL

## 2023-06-07 ENCOUNTER — HOSPITAL ENCOUNTER (OUTPATIENT)
Dept: RADIOLOGY | Facility: HOSPITAL | Age: 48
Discharge: HOME | End: 2023-06-07
Payer: COMMERCIAL

## 2023-06-07 VITALS
RESPIRATION RATE: 18 BRPM | HEIGHT: 65 IN | HEART RATE: 88 BPM | TEMPERATURE: 99 F | BODY MASS INDEX: 38.32 KG/M2 | DIASTOLIC BLOOD PRESSURE: 70 MMHG | WEIGHT: 230 LBS | SYSTOLIC BLOOD PRESSURE: 128 MMHG | OXYGEN SATURATION: 99 %

## 2023-06-07 DIAGNOSIS — J45.41 MODERATE PERSISTENT ASTHMA WITH ACUTE EXACERBATION: ICD-10-CM

## 2023-06-07 DIAGNOSIS — J40 BRONCHITIS: Primary | ICD-10-CM

## 2023-06-07 DIAGNOSIS — J40 BRONCHITIS: ICD-10-CM

## 2023-06-07 PROCEDURE — 99215 OFFICE O/P EST HI 40 MIN: CPT | Performed by: INTERNAL MEDICINE

## 2023-06-07 PROCEDURE — 71046 X-RAY EXAM CHEST 2 VIEWS: CPT

## 2023-06-07 RX ORDER — PREDNISONE 20 MG/1
40 TABLET ORAL DAILY
Qty: 10 TABLET | Refills: 0 | Status: SHIPPED | OUTPATIENT
Start: 2023-06-07 | End: 2023-06-12

## 2023-06-07 RX ORDER — DOXYCYCLINE HYCLATE 100 MG/1
CAPSULE ORAL
COMMUNITY
Start: 2023-05-30

## 2023-06-07 RX ORDER — FLUTICASONE FUROATE AND VILANTEROL 200; 25 UG/1; UG/1
1 POWDER RESPIRATORY (INHALATION) DAILY
Qty: 180 BLISTER | Refills: 0 | Status: SHIPPED | OUTPATIENT
Start: 2023-06-07 | End: 2023-09-05

## 2023-06-07 NOTE — LETTER
June 7, 2023     [address] [place] [address]    Patient: Luis Alberto Adler   YOB: 1975   Date of Visit: 6/7/2023       Dear Dr Irais Diaz: Thank you for referring Luis Alberto Adler to me for evaluation  Below are my notes for this consultation  If you have questions, please do not hesitate to call me  I look forward to following your patient along with you  Sincerely,        Ludy Tsang MD        CC: No Recipients    Ludy Tsang MD  6/7/2023 11:55 AM  Incomplete  Pulmonary Follow Up Note  Alyce Pels 50 y o  female MRN: [id number]  6/7/2023      HPI:    ***    Exercise Tolerance: Able to ambulate *** blocks, and able to climb *** flights of stairs       Meds:  ***    ROS:  ***  Constitutional: - Fatigue, - chills, - fever, - weight change  HEENT: - rhinorrhea, - sneezing, - sore throat  Respiratory: - cough, - shortness of breath, - wheezing  Cardiovascular: - chest pain,  -palpitations, - leg swelling  Gastrointestinal: - abdominal pain, - constipation, - diarrhea, - nausea, - vomiting  Endocrine: - cold intolerance, - heat intolerance  Genitourinary: - dysuria  Musculoskeletal: - arthralgias  Skin:- rash, - wound  Allergic/Immunologic: - allergies  Neurological: - dizziness, - numbness        Vitals: not currently breastfeeding  , There is no height or weight on file to calculate BMI  Physical Exam:  GEN *** NAD  HEENT *** ncat, non icteric, MM moist  NECK *** supple, no JVD, no LAD  CV *** +s1s2, no mrg, RRR  PULM *** CTA BL, no wrr  ABD *** soft, ntnd, + BS  EXT *** no edema, no cyanosis, no clubbing  NEURO *** Aox3, no focal weakness    Imaging and other studies:   ***    Pulmonary function testing:   ***    EKG, Pathology, and Other Studies:  ***    Assessment:  ***    Plan:  ***  Return visit in ***  On next visit we will evaluate ***    TEENA Gaitan [facility]

## 2023-06-07 NOTE — PROGRESS NOTES
"Pulmonary Follow Up Note  Janina Oliva 50 y o  female MRN: [id number]  6/7/2023      HPI:    Patient endorses shortness of breath chest tightness and cough for the last 13 days  She states that she had an upper respiratory infection that triggered all of her symptoms  She has had mild relief since that time but still has significant symptoms  Cough is nonproductive  Meds:  Breo 100 daily  Albuterol as needed    ROS:  Constitutional: - Fatigue, - chills, - fever, - weight change  HEENT: - rhinorrhea, - sneezing, - sore throat  Respiratory: + Cough, + shortness of breath, - wheezing  Cardiovascular: - chest pain,  -palpitations, - leg swelling  Gastrointestinal: - abdominal pain, - constipation, - diarrhea, - nausea, - vomiting  Endocrine: - cold intolerance, - heat intolerance  Genitourinary: - dysuria  Musculoskeletal: - arthralgias  Skin:- rash, - wound  Allergic/Immunologic: - allergies  Neurological: - dizziness, - numbness        Vitals: Blood pressure 128/70, pulse 88, temperature 99 °F (37 2 °C), temperature source Tympanic, resp  rate 18, height 5' 5\" (1 651 m), weight 104 kg (230 lb), SpO2 99 %, not currently breastfeeding , Body mass index is 38 27 kg/m²      Physical Exam:  GEN + mild respiratory distress  HEENT  ncat, non icteric, MM moist  NECK  supple, no JVD, no LAD  CV  +s1s2, no mrg, RRR  PULM  CTA BL, no wrr  ABD  soft, nt, obese, + BS  EXT  no cyanosis, no clubbing  NEURO  Aox3, no focal weakness    Imaging and other studies:   No recent imaging    Pulmonary function testing:   None available    Assessment:  Asthma with acute exacerbation  Bronchitis    Plan:  · Change Breo to 200 dose daily  · Again, ordered CBC with differential  · Again, ordered [facility] allergy panel  · Requested that patient obtain blood work prior to starting prednisone  · Check chest x-ray  · Prescribed prednisone 40 mg p o  daily for 5 days  · Patient instructed to seek medical attention in emergency " department with any worsening in symptoms  Return visit in 4 weeks with Dr Carola Mendez  On next visit we will evaluate symptoms, CBC results, [place] allergy panel results     TEENA Crouch    [facility]  Answers for HPI/ROS submitted by the patient on 6/5/2023  Do you have a cough?: Yes  Do you have difficulty breathing?: Yes  Do you have a hoarse voice?: Yes  Do you have wheezing?: Yes  Chronicity: new  When did you first notice your symptoms?: in the past 7 days  How often do your symptoms occur?: 2 to 4 times per day  Since you first noticed this problem, how has it changed?: waxing and waning  Have you had a change in appetite?: No  Do you have chest pain?: No  Do you have shortness of breath that occurs with effort or exertion?: Yes  Do you have ear congestion?: No  Do you have ear pain?: No  Do you have a fever?: No  Do you have headaches?: Yes  Do you have heartburn?: No  Do you have fatigue?: No  Do you have muscle pain?: No  Do you have nasal congestion?: Yes  Do you have shortness of breath when lying flat?: No  Do you have shortness of breath when you wake up?: No  Do you have post-nasal drip?: Yes  Do you have a runny nose?: No  Do you have sneezing?: Yes  Do you have a sore throat?: Yes  Do you have sweats?: No  Do you have trouble swallowing?: No  Have you experienced weight loss?: No  Which of the following makes your symptoms worse?: lying down  Which of the following makes your symptoms better?: change in position, cold air, OTC cough suppressant

## 2023-08-24 ENCOUNTER — RA CDI HCC (OUTPATIENT)
Dept: OTHER | Facility: HOSPITAL | Age: 48
End: 2023-08-24

## 2023-08-24 NOTE — PROGRESS NOTES
[address] [facility] coding opportunities       Chart reviewed, no opportunity found: CHART REVIEWED, NO OPPORTUNITY FOUND        Patients Insurance        Commercial Insurance: [employer] Cifuentes

## 2023-09-01 DIAGNOSIS — J45.41 MODERATE PERSISTENT ASTHMA WITH ACUTE EXACERBATION: ICD-10-CM

## 2023-09-01 RX ORDER — FLUTICASONE FUROATE AND VILANTEROL TRIFENATATE 200; 25 UG/1; UG/1
POWDER RESPIRATORY (INHALATION)
Qty: 180 EACH | OUTPATIENT
Start: 2023-09-01

## 2023-09-22 ENCOUNTER — TELEPHONE (OUTPATIENT)
Dept: PULMONOLOGY | Facility: CLINIC | Age: 48
End: 2023-09-22

## 2023-09-22 NOTE — TELEPHONE ENCOUNTER
LM for patient to give a call back to set up a follow up Appt in [place]  with Chavo Allen next available

## 2023-10-09 ENCOUNTER — TELEPHONE (OUTPATIENT)
Dept: PULMONOLOGY | Facility: CLINIC | Age: 48
End: 2023-10-09

## 2023-10-09 DIAGNOSIS — J45.41 MODERATE PERSISTENT ASTHMA WITH ACUTE EXACERBATION: ICD-10-CM

## 2023-10-09 RX ORDER — FLUTICASONE FUROATE AND VILANTEROL 200; 25 UG/1; UG/1
1 POWDER RESPIRATORY (INHALATION) DAILY
Qty: 180 BLISTER | Refills: 0 | Status: SHIPPED | OUTPATIENT
Start: 2023-10-09 | End: 2023-10-11

## 2023-10-10 RX ORDER — AMOXICILLIN 500 MG/1
500 CAPSULE ORAL 3 TIMES DAILY
COMMUNITY
Start: 2023-08-14 | End: 2023-10-19

## 2023-10-10 RX ORDER — ACETAMINOPHEN AND CODEINE PHOSPHATE 300; 30 MG/1; MG/1
TABLET ORAL
COMMUNITY
Start: 2023-08-14

## 2023-10-10 RX ORDER — KETOCONAZOLE 20 MG/ML
SHAMPOO TOPICAL
COMMUNITY
Start: 2023-09-01

## 2023-10-10 RX ORDER — KETOCONAZOLE 20 MG/G
CREAM TOPICAL
COMMUNITY
Start: 2023-09-01

## 2023-10-11 ENCOUNTER — OFFICE VISIT (OUTPATIENT)
Dept: PULMONOLOGY | Facility: CLINIC | Age: 48
End: 2023-10-11
Payer: COMMERCIAL

## 2023-10-11 VITALS
BODY MASS INDEX: 38.32 KG/M2 | OXYGEN SATURATION: 97 % | RESPIRATION RATE: 16 BRPM | DIASTOLIC BLOOD PRESSURE: 76 MMHG | SYSTOLIC BLOOD PRESSURE: 132 MMHG | WEIGHT: 230 LBS | TEMPERATURE: 99.5 F | HEART RATE: 79 BPM | HEIGHT: 65 IN

## 2023-10-11 DIAGNOSIS — J45.41 MODERATE PERSISTENT ASTHMA WITH ACUTE EXACERBATION: ICD-10-CM

## 2023-10-11 DIAGNOSIS — J45.40 MODERATE PERSISTENT ASTHMA WITHOUT COMPLICATION: Primary | ICD-10-CM

## 2023-10-11 PROBLEM — J40 BRONCHITIS: Status: RESOLVED | Noted: 2023-06-07 | Resolved: 2023-10-11

## 2023-10-11 PROCEDURE — 99214 OFFICE O/P EST MOD 30 MIN: CPT | Performed by: INTERNAL MEDICINE

## 2023-10-11 RX ORDER — FLUTICASONE FUROATE AND VILANTEROL 100; 25 UG/1; UG/1
1 POWDER RESPIRATORY (INHALATION) DAILY
Qty: 60 BLISTER | Refills: 6 | Status: SHIPPED | OUTPATIENT
Start: 2023-10-11 | End: 2024-05-08

## 2023-10-11 RX ORDER — ALBUTEROL SULFATE 2.5 MG/3ML
2.5 SOLUTION RESPIRATORY (INHALATION) EVERY 6 HOURS PRN
Qty: 180 ML | Refills: 3 | Status: SHIPPED | OUTPATIENT
Start: 2023-10-11 | End: 2023-12-10

## 2023-10-11 NOTE — ASSESSMENT & PLAN NOTE
She has been well controlled when she was on Breo 100mcg prior to getting bronchitis this summer. I will restart the Breo 100mcg dose to start today. She will continue to use albuterol prn. Her [facility] panel shows multiple allergies and is using allergy medications. Her CBC has mild increase in eosinophilia but I don't think she warrants biologic treatments at this time based of symptoms. She states that she doesn't take a flu shot because she doesn't get the flu.

## 2023-10-11 NOTE — PROGRESS NOTES
Assessment:    Moderate persistent asthma without complication  She has been well controlled when she was on Breo 100mcg prior to getting bronchitis this summer. I will restart the Breo 100mcg dose to start today. She will continue to use albuterol prn. Her [facility] panel shows multiple allergies and is using allergy medications. Her CBC has mild increase in eosinophilia but I don't think she warrants biologic treatments at this time based of symptoms. She states that she doesn't take a flu shot because she doesn't get the flu. Plan:    Diagnoses and all orders for this visit:    Moderate persistent asthma without complication    Moderate persistent asthma with acute exacerbation  -     Fluticasone Furoate-Vilanterol (Breo Ellipta) 100-25 mcg/actuation inhaler; Inhale 1 puff daily Rinse mouth after use. -     albuterol (2.5 mg/3 mL) 0.083 % nebulizer solution; Take 3 mL (2.5 mg total) by nebulization every 6 (six) hours as needed for wheezing or shortness of breath    Other orders  -     amoxicillin (AMOXIL) 500 mg capsule; Take 500 mg by mouth 3 (three) times a day  -     ketoconazole (NIZORAL) 2 % cream; APPLY TO RASH AREAS AROUND EYES EVERY MORNING, MIXED WITH PIMECROLIMUS. -     ketoconazole (NIZORAL) 2 % shampoo; PLEASE SEE ATTACHED FOR DETAILED DIRECTIONS  -     acetaminophen-codeine (TYLENOL with CODEINE #3) 300-30 MG per tablet; TAKE 1 TABLET BY MOUTH EVERY 4 HOURS TO EVERY 6 HOURS AS NEEDED FOR PAIN        Follow-up: 6 months with Dr Chey Cleveland      HPI:  She was seen in [place] for bronchitis and then had complete resolution. This summer after she was sick she was doing well from an asthma standpoint  She was on Breo 200mcg  Then went to 100mcg and was doing well. Then she ran out of Breo 2 weeks ago. Since that time she has chest tightness and "pressure on heart" as well as dyspnea.    Currently no fevers or chills or concern for bronchitis    She has ventolin or neb - uses when really bad with aleergens/URI or when out of breo       Review of Systems   Constitutional:  Negative for appetite change and fever. HENT:  Positive for postnasal drip and sneezing. Negative for ear pain, rhinorrhea, sore throat and trouble swallowing. Respiratory:  Positive for cough. Cardiovascular:  Positive for chest pain. Musculoskeletal:  Negative for myalgias. Neurological:  Positive for headaches. The following portions of the patient's history were reviewed and updated as appropriate: allergies, current medications, past family history, past medical history, past social history, past surgical history, and problem list.    VITALS:  Vitals:    10/11/23 1531   BP: 132/76   BP Location: Left arm   Patient Position: Sitting   Cuff Size: Adult   Pulse: 79   Resp: 16   Temp: 99.5 °F (37.5 °C)   TempSrc: Tympanic   SpO2: 97%   Weight: 104 kg (230 lb)   Height: 5' 5" (1.651 m)       Physical Exam  General:  Patient is awake, alert, non-toxic and in no acute respiratory distress  Neck: No JVD  CV:  Regular, +S1 and S2, No murmurs, gallops or rubs appreciated  Lungs: CTA bilateral without wheeze, rales or rhonci  Abdomen: Soft, +BS, Non-tender, non-distended  Extremities: No clubbing, cyanosis or edema  Neuro: No focal deficits        Diagnostic Testing:    CBC:        BMP:   Lab Results   Component Value Date    K 4.0 02/12/2021     02/12/2021    CO2 20 02/12/2021    BUN 12 02/12/2021    CREATININE 1.04 (H) 02/12/2021    CALCIUM 8.8 12/21/2019    AST 13 01/11/2021    ALT 14 01/11/2021    ALKPHOS 94 12/21/2019         Imaging studies:  I have personally reviewed pertinent reports. CXR June 2023  FINDINGS:     Cardiomediastinal silhouette appears unremarkable. The lungs are clear. No pneumothorax or pleural effusion. Osseous structures appear within normal limits for patient age. IMPRESSION:     No acute cardiopulmonary disease.       Elisha Ruiz DO  Answers submitted by the patient for this visit:  Pulmonology Questionnaire (Submitted on 10/9/2023)  Chief Complaint: Primary symptoms  Do you have chest tightness?: Yes  Chronicity: new  When did you first notice your symptoms?: in the past 7 days  How often do your symptoms occur?: daily  Since you first noticed this problem, how has it changed?: gradually improving  Do you have shortness of breath that occurs with effort or exertion?: No  Do you have ear congestion?: No  Do you have heartburn?: No  Do you have fatigue?: No  Do you have nasal congestion?: Yes  Do you have shortness of breath when lying flat?: No  Do you have shortness of breath when you wake up?: No  Do you have sweats?: No  Have you experienced weight loss?: No  Which of the following makes your symptoms worse?: change in weather  Which of the following makes your symptoms better?: oral steroids, OTC cough suppressant, steroid inhaler

## 2023-10-11 NOTE — LETTER
October 11, 2023     Alyssa Laurent, [address]    Patient: Rosemary Valladares   YOB: 1975   Date of Visit: 10/11/2023       Dear Dr. Martha Mccormack: Thank you for referring Rosemary Valladares to me for evaluation. Below are my notes for this consultation. If you have questions, please do not hesitate to call me. I look forward to following your patient along with you. Sincerely,        Darrian Briscoe DO        CC: No Recipients    Darrian Briscoe DO  10/11/2023  5:20 PM  Sign when Signing Visit  Assessment:    Moderate persistent asthma without complication  She has been well controlled when she was on Breo 100mcg prior to getting bronchitis this summer. I will restart the Breo 100mcg dose to start today. She will continue to use albuterol prn. Her [place] panel shows multiple allergies and is using allergy medications. Her CBC has mild increase in eosinophilia but I don't think she warrants biologic treatments at this time based of symptoms. She states that she doesn't take a flu shot because she doesn't get the flu. Plan:    Diagnoses and all orders for this visit:    Moderate persistent asthma without complication    Moderate persistent asthma with acute exacerbation  -     Fluticasone Furoate-Vilanterol (Breo Ellipta) 100-25 mcg/actuation inhaler; Inhale 1 puff daily Rinse mouth after use. -     albuterol (2.5 mg/3 mL) 0.083 % nebulizer solution; Take 3 mL (2.5 mg total) by nebulization every 6 (six) hours as needed for wheezing or shortness of breath    Other orders  -     amoxicillin (AMOXIL) 500 mg capsule; Take 500 mg by mouth 3 (three) times a day  -     ketoconazole (NIZORAL) 2 % cream; APPLY TO RASH AREAS AROUND EYES EVERY MORNING, MIXED WITH PIMECROLIMUS.   -     ketoconazole (NIZORAL) 2 % shampoo; PLEASE SEE ATTACHED FOR DETAILED DIRECTIONS  -     acetaminophen-codeine (TYLENOL with CODEINE #3) 300-30 MG per tablet; TAKE 1 TABLET BY MOUTH EVERY 4 HOURS TO EVERY 6 HOURS AS NEEDED FOR PAIN        Follow-up: 6 months with Dr Ham Quevedo      HPI:  She was seen in [place] for bronchitis and then had complete resolution. This summer after she was sick she was doing well from an asthma standpoint  She was on Breo 200mcg  Then went to 100mcg and was doing well. Then she ran out of Breo 2 weeks ago. Since that time she has chest tightness and "pressure on heart" as well as dyspnea. Currently no fevers or chills or concern for bronchitis    She has ventolin or neb - uses when really bad with aleergens/URI or when out of breo       Review of Systems   Constitutional:  Negative for appetite change and fever. HENT:  Positive for postnasal drip and sneezing. Negative for ear pain, rhinorrhea, sore throat and trouble swallowing. Respiratory:  Positive for cough. Cardiovascular:  Positive for chest pain. Musculoskeletal:  Negative for myalgias. Neurological:  Positive for headaches.        The following portions of the patient's history were reviewed and updated as appropriate: allergies, current medications, past family history, past medical history, past social history, past surgical history, and problem list.    VITALS:  Vitals:    10/11/23 1531   BP: 132/76   BP Location: Left arm   Patient Position: Sitting   Cuff Size: Adult   Pulse: 79   Resp: 16   Temp: 99.5 °F (37.5 °C)   TempSrc: Tympanic   SpO2: 97%   Weight: 104 kg (230 lb)   Height: 5' 5" (1.651 m)       Physical Exam  General:  Patient is awake, alert, non-toxic and in no acute respiratory distress  Neck: No JVD  CV:  Regular, +S1 and S2, No murmurs, gallops or rubs appreciated  Lungs: CTA bilateral without wheeze, rales or rhonci  Abdomen: Soft, +BS, Non-tender, non-distended  Extremities: No clubbing, cyanosis or edema  Neuro: No focal deficits        Diagnostic Testing:    CBC:        BMP:   Lab Results   Component Value Date    K 4.0 02/12/2021     02/12/2021    CO2 20 02/12/2021    BUN 12 02/12/2021    CREATININE 1.04 (H) 02/12/2021    CALCIUM 8.8 12/21/2019    AST 13 01/11/2021    ALT 14 01/11/2021    ALKPHOS 94 12/21/2019         Imaging studies:  I have personally reviewed pertinent reports. CXR June 2023  FINDINGS:     Cardiomediastinal silhouette appears unremarkable. The lungs are clear. No pneumothorax or pleural effusion. Osseous structures appear within normal limits for patient age. IMPRESSION:     No acute cardiopulmonary disease.       Eric Mems, DO  Answers submitted by the patient for this visit:  Pulmonology Questionnaire (Submitted on 10/9/2023)  Chief Complaint: Primary symptoms  Do you have chest tightness?: Yes  Chronicity: new  When did you first notice your symptoms?: in the past 7 days  How often do your symptoms occur?: daily  Since you first noticed this problem, how has it changed?: gradually improving  Do you have shortness of breath that occurs with effort or exertion?: No  Do you have ear congestion?: No  Do you have heartburn?: No  Do you have fatigue?: No  Do you have nasal congestion?: Yes  Do you have shortness of breath when lying flat?: No  Do you have shortness of breath when you wake up?: No  Do you have sweats?: No  Have you experienced weight loss?: No  Which of the following makes your symptoms worse?: change in weather  Which of the following makes your symptoms better?: oral steroids, OTC cough suppressant, steroid inhaler

## 2023-10-19 ENCOUNTER — OFFICE VISIT (OUTPATIENT)
Dept: FAMILY MEDICINE CLINIC | Facility: MEDICAL CENTER | Age: 48
End: 2023-10-19
Payer: COMMERCIAL

## 2023-10-19 VITALS
BODY MASS INDEX: 38.65 KG/M2 | DIASTOLIC BLOOD PRESSURE: 64 MMHG | SYSTOLIC BLOOD PRESSURE: 128 MMHG | WEIGHT: 232 LBS | HEIGHT: 65 IN | TEMPERATURE: 100 F | RESPIRATION RATE: 18 BRPM | OXYGEN SATURATION: 97 % | HEART RATE: 80 BPM

## 2023-10-19 DIAGNOSIS — I10 PRIMARY HYPERTENSION: Primary | ICD-10-CM

## 2023-10-19 PROCEDURE — 99213 OFFICE O/P EST LOW 20 MIN: CPT

## 2023-10-19 NOTE — PROGRESS NOTES
Assessment/Plan:    Mammogram up-to-date. Colonoscopy up-to-date. Influenza vaccine declined. Tdap utd per patient as of 2016 at [facility]. Follow-up in 6 months for annual physical, sooner if needed. 1. Primary hypertension  Stable with current management. Continue lisinopril at current dose. Subjective:      Patient ID: Mejia Martino is a 46 y.o. female. 47-year-old female presents for follow-up. She has hypertension, currently on lisinopril. Tolerating medication well, denies adverse side effects. Does not need refills at this time. She continues to follow regularly with pulmonology for asthma. She tells me she is scheduled for next month with weight management. She declines influenza vaccine. Reports being up-to-date [facility]  Offers no concerns or complaints at this time. The following portions of the patient's history were reviewed and updated as appropriate: allergies, past family history, past medical history, past social history, past surgical history, and problem list.    Review of Systems   Constitutional: Negative. HENT: Negative. Eyes: Negative. Respiratory: Negative. Cardiovascular: Negative. Gastrointestinal: Negative. Endocrine: Negative. Genitourinary: Negative. Musculoskeletal: Negative. Skin: Negative. Allergic/Immunologic: Negative. Neurological: Negative. Hematological: Negative. Psychiatric/Behavioral: Negative. Objective:      /64 (BP Location: Left arm, Patient Position: Sitting, Cuff Size: Large)   Pulse 80   Temp 100 °F (37.8 °C)   Resp 18   Ht 5' 5" (1.651 m)   Wt 105 kg (232 lb)   LMP 10/02/2023 (Approximate)   SpO2 97%   BMI 38.61 kg/m²          Physical Exam  Vitals and nursing note reviewed. Constitutional:       General: She is not in acute distress. Appearance: Normal appearance. She is obese. She is not ill-appearing.    HENT:      Head: Normocephalic and atraumatic. Eyes:      Conjunctiva/sclera: Conjunctivae normal.      Pupils: Pupils are equal, round, and reactive to light. Cardiovascular:      Rate and Rhythm: Normal rate and regular rhythm. Pulses: Normal pulses. Heart sounds: Normal heart sounds. Pulmonary:      Effort: Pulmonary effort is normal.      Breath sounds: Normal breath sounds. Musculoskeletal:         General: Normal range of motion. Cervical back: Normal range of motion and neck supple. Skin:     General: Skin is warm and dry. Neurological:      General: No focal deficit present. Mental Status: She is alert and oriented to person, place, and time. Mental status is at baseline. Psychiatric:         Mood and Affect: Mood normal.         Behavior: Behavior normal.         Thought Content: Thought content normal.             Depression Screening and Follow-up Plan: Patient was screened for depression during today's encounter. They screened negative with a PHQ-2 score of 0.             LEONEL Siddiqui

## 2023-11-07 DIAGNOSIS — I10 PRIMARY HYPERTENSION: ICD-10-CM

## 2023-11-07 RX ORDER — LISINOPRIL 10 MG/1
10 TABLET ORAL DAILY
Qty: 90 TABLET | Refills: 1 | Status: SHIPPED | OUTPATIENT
Start: 2023-11-07

## 2023-11-14 ENCOUNTER — OFFICE VISIT (OUTPATIENT)
Dept: BARIATRICS | Facility: CLINIC | Age: 48
End: 2023-11-14
Payer: COMMERCIAL

## 2023-11-14 VITALS
DIASTOLIC BLOOD PRESSURE: 80 MMHG | HEART RATE: 82 BPM | WEIGHT: 238.6 LBS | SYSTOLIC BLOOD PRESSURE: 138 MMHG | BODY MASS INDEX: 38.35 KG/M2 | HEIGHT: 66 IN

## 2023-11-14 DIAGNOSIS — E66.01 CLASS 2 SEVERE OBESITY DUE TO EXCESS CALORIES WITH SERIOUS COMORBIDITY AND BODY MASS INDEX (BMI) OF 39.0 TO 39.9 IN ADULT: Primary | ICD-10-CM

## 2023-11-14 DIAGNOSIS — K21.9 GASTROESOPHAGEAL REFLUX DISEASE WITHOUT ESOPHAGITIS: ICD-10-CM

## 2023-11-14 DIAGNOSIS — I10 PRIMARY HYPERTENSION: ICD-10-CM

## 2023-11-14 PROBLEM — E66.812 CLASS 2 SEVERE OBESITY DUE TO EXCESS CALORIES WITH SERIOUS COMORBIDITY AND BODY MASS INDEX (BMI) OF 39.0 TO 39.9 IN ADULT (HCC): Status: ACTIVE | Noted: 2023-11-14

## 2023-11-14 PROCEDURE — 99204 OFFICE O/P NEW MOD 45 MIN: CPT | Performed by: NURSE PRACTITIONER

## 2023-11-14 RX ORDER — AMOXICILLIN AND CLAVULANATE POTASSIUM 875; 125 MG/1; MG/1
1 TABLET, FILM COATED ORAL
COMMUNITY
Start: 2023-11-03 | End: 2023-11-14 | Stop reason: ALTCHOICE

## 2023-11-14 NOTE — ASSESSMENT & PLAN NOTE
- Discussed options of HealthyCORE-Intensive Lifestyle Intervention Program, Very Low Calorie Diet-VLCD, and Conservative Program and the role of weight loss medications. - Explained the importance of making lifestyle changes first before starting anti-obesity medications. - Patient should demonstrate lifestyle changes first before anti-obesity medication initiated. - Patient is interested in pursuing HealthyCORE-Intensive Lifestyle Intervention Program and follow up visits with medical weight management provider. - Initial weight loss goal of 5-10% weight loss for improved health  - Weight loss can improve patient's co-morbid conditions and/or prevent weight-related complications. - Labs reviewed from 6/2023  -Patient lifestyle habits and barriers to weight loss were discussed today. Patient will be participating in healthy core program and will gain a solid knowledge of nutrition by meeting with a dietitian to better balance her meals and portions. She will also meet with the  to help with behavior changes associated with her eating habits. She will work on her stress eating and also coping with some changes that come along with lifestyle modification. Patient was encouraged to continue with an exercise routine by doing at home exercises or joining a gym where she is supervised. Medications were discussed and patient was interested in starting on Wegovy. She was aware of the supply concerns and will be willing to try to find it at the pharmacy and will make sure she has enough time between refills so she does not lapse in her therapy. Pen was demonstrated in the office today. HMGDUE Instructions:    - Begin Wegovy 0.25 mg subcutaneously once a week. Dose changes may occur after 4 doses if medication is tolerated. You will be assessed prior to each dose change to make sure you are tolerating the medication well.   - Please message me when you have 2 pens left from the prescription so there are no lapses in treatment. - Visit [facility] for further information/injection instructions.   -Please eat small frequent meals to help reduce nausea. Lemon water and saltine crackers may help with this. - Side effects of Wegovy discussed: nausea, vomiting, diarrhea, and constipation. If you experience fever, nausea/vomiting, and pain radiating to your back this may be a sign of pancreatitis. Please go to the emergency room if this occurs. - Patient understands the side effects of the medication and proper administration. Patient agrees with the treatment plan and all questions were answered. Goals:  Calorie Goal: 3972-6908 calories per day  Do not skip meals. Food log via hcace - options include  www. m-spatial.com, sparkpeople. com, loseit.com, calorieking. com, baritastic  No sugary beverages. At least 64oz of water daily. Increase physical activity by 10 minutes daily.  Gradually increase physical activity to a goal of 5 days per week for 30 minutes of MODERATE intensity PLUS 2 days per week of FULL BODY resistance training

## 2023-11-14 NOTE — PROGRESS NOTES
Assessment/Plan:    Class 2 severe obesity due to excess calories with serious comorbidity and body mass index (BMI) of 39.0 to 39.9 in adult   - Discussed options of HealthyCORE-Intensive Lifestyle Intervention Program, Very Low Calorie Diet-VLCD, and Conservative Program and the role of weight loss medications. - Explained the importance of making lifestyle changes first before starting anti-obesity medications. - Patient should demonstrate lifestyle changes first before anti-obesity medication initiated. - Patient is interested in pursuing HealthyCORE-Intensive Lifestyle Intervention Program and follow up visits with medical weight management provider. - Initial weight loss goal of 5-10% weight loss for improved health  - Weight loss can improve patient's co-morbid conditions and/or prevent weight-related complications. - Labs reviewed from 6/2023  -Patient lifestyle habits and barriers to weight loss were discussed today. Patient will be participating in healthy core program and will gain a solid knowledge of nutrition by meeting with a dietitian to better balance her meals and portions. She will also meet with the  to help with behavior changes associated with her eating habits. She will work on her stress eating and also coping with some changes that come along with lifestyle modification. Patient was encouraged to continue with an exercise routine by doing at home exercises or joining a gym where she is supervised. Medications were discussed and patient was interested in starting on Wegovy. She was aware of the supply concerns and will be willing to try to find it at the pharmacy and will make sure she has enough time between refills so she does not lapse in her therapy. Pen was demonstrated in the office today. DFFSBO Instructions:    - Begin Wegovy 0.25 mg subcutaneously once a week. Dose changes may occur after 4 doses if medication is tolerated.  You will be assessed prior to each dose change to make sure you are tolerating the medication well. - Please message me when you have 2 pens left from the prescription so there are no lapses in treatment. - Visit [facility] for further information/injection instructions.   -Please eat small frequent meals to help reduce nausea. Lemon water and saltine crackers may help with this. - Side effects of Wegovy discussed: nausea, vomiting, diarrhea, and constipation. If you experience fever, nausea/vomiting, and pain radiating to your back this may be a sign of pancreatitis. Please go to the emergency room if this occurs. - Patient understands the side effects of the medication and proper administration. Patient agrees with the treatment plan and all questions were answered. Goals:  Calorie Goal: 8974-4520 calories per day  Do not skip meals. Food log via chace - options include  www. [employer].com, sparkpeople. com, loseit.com, calorieking. com, baritast[employer]  No sugary beverages. At least 64oz of water daily. Increase physical activity by 10 minutes daily. Gradually increase physical activity to a goal of 5 days per week for 30 minutes of MODERATE intensity PLUS 2 days per week of FULL BODY resistance training     Gastroesophageal reflux disease without esophagitis  Treatment currently includes Prevacid 30 mg every other day    Primary hypertension  Blood pressure slightly elevated today  Patient currently on lisinopril 10 mg daily  Would avoid phentermine         Alyce was seen today for consult. Diagnoses and all orders for this visit:    Class 2 severe obesity due to excess calories with serious comorbidity and body mass index (BMI) of 39.0 to 39.9 in adult   -     Semaglutide-Weight Management (WEGOVY) 0.25 MG/0.5ML; Inject 0.5 mL (0.25 mg total) under the skin once a week    Gastroesophageal reflux disease without esophagitis    Primary hypertension       Patient denies personal history of pancreatitis.  Patient also denies personal and family history of medullary thyroid cancer and multiple endocrine neoplasia type 2 (MEN 2 tumor). Patient denies any history of kidney stones, seizures, or glaucoma. Patient is currently on birth control. Total time spent reviewing chart, interviewing patient, examining patient, discussing plan, answering all questions, and documentin min, with >50% face-to-face time spent counseling patient on nonsurgical interventions for the treatment of excess weight. Discussed in detail nonsurgical options including intensive lifestyle intervention program, very low-calorie diet program and conservative program.  Discussed the role of weight loss medications. Counseled patient on diet behavior and exercise modification for weight loss. Follow up in approximately 2 months with Non-Surgical Physician/Advanced Practitioner. Subjective:   Chief Complaint   Patient presents with    Consult     Pt is here for MWM consult. Patient ID: Terri Ricketts  is a 46 y.o. female with excess weight/obesity here to pursue weight management. Previous notes and records have been reviewed. Past Medical History:   Diagnosis Date    Arthritis     Asthma     Bronchitis 2023    Contact lens/glasses fitting     Family history of colon cancer     hep C-liver cancer- to colon cancer-father    GERD (gastroesophageal reflux disease)     Obesity (BMI 30.0-34. 9)      Past Surgical History:   Procedure Laterality Date     SECTION      ,     DILATION AND CURETTAGE OF UTERUS      miscarriage    EGD      [place] ARTHRP KNE CONDYLE&PLATU MEDIAL&LAT COMPARTMENTS Left 2023    Procedure: ARTHROPLASTY KNEE TOTAL W ROBOT - SAME DAY - PRESS FIT - LEFT;  Surgeon: Samuel Mancuso DO;  Location: [facility];  Service: Orthopedics    ROTATOR CUFF REPAIR Right     SHOULDER SURGERY      TONSILLECTOMY      TUBAL LIGATION         HPI:  Wt Readings from Last 20 Encounters:   23 108 kg (238 lb 9.6 oz)   10/19/23 105 kg (232 lb)   10/11/23 104 kg (230 lb)   06/07/23 104 kg (230 lb)   04/19/23 104 kg (230 lb)   03/14/23 104 kg (230 lb)   02/22/23 103 kg (226 lb 12.8 oz)   02/08/23 103 kg (228 lb)   01/25/23 106 kg (234 lb)   01/09/23 106 kg (234 lb)   12/13/22 106 kg (234 lb)   11/10/22 107 kg (235 lb)   10/28/22 104 kg (230 lb)   10/06/22 105 kg (230 lb 6.4 oz)   09/02/22 104 kg (230 lb)   07/21/22 97 kg (213 lb 13.5 oz)   12/02/21 97.1 kg (214 lb)   11/30/21 95.3 kg (210 lb)   11/29/21 95.3 kg (210 lb)   11/18/21 94.3 kg (208 lb)       Patient presents today to medical weight management office for consult. Patient has been struggling with her weight on and off for many years. Patient will participate in different programs such as Boot Camp's, diets, meeting with nutritionist -she will gain about 30 pounds but then gained it back plus some more. This past January patient had a left knee replacement and her mobility is still not where she would like it to be. She is going to start going back to the gym in the next few months but would feel better if she had lost some weight prior to going back. Patient states she has started to gain weight more rapidly as she is approaching menopause. She knows coworkers who are taking injectable medications for weight loss and is interested in starting these herself. Patient is also looking for a program to keep her accountable and keep her on track. Obesity/Excess Weight:  Severity: Moderate  Onset:  many years    Modifiers: Diet and Exercise and Commercial Weight Loss Programs-ie.  Weight Watchers, [place] [employer], Nutrisystem, etc.  Contributing factors: Poor Food Choices, Insufficient Physical Activity, Stress/Emotional Eating, and Binge Eating  Associated symptoms: comorbid conditions, fatigue, increased joint pain, decreased exercise capacity, body image issues, decreased self esteem, increased shortness of breath, decreased mobility, depression, inability to do certain activities, and clothes do not fit    Diet recall:  B: cottage cheese with fruit OR 1/2 bagel with CC  S: cubes and cheese and grapes OR skips  L: chicken meatballs with sauce OR Tiffani sub OR chili OR homemade chicken noodle soup  S: no  D: vary - skips sometimes OR chicken meatballs with sauce OR Tiffani sub OR chili OR homemade chicken noodle soup OR pasta OR steak with salad OR chicken with vegetables  S: no    Hydration: water - 48-64oz, unsweetened iced tea, hot tea  Alcohol: no - rarely  Smoking: no  Exercise: none right now  Occupation: teacher  Sleep: well but only 4-5 hours  STOP ban/8    Current weight: 238.6 lbs  Current BMI: 39.10  Current Waist Measurement: 42.5 in  Goal weight: 170-180 lbs ultimately    Colonoscopy:   Mammogram: 2023    The following portions of the patient's history were reviewed and updated as appropriate: allergies, current medications, past family history, past medical history, past social history, past surgical history, and problem list.    Family History   Problem Relation Age of Onset    Heart disease Mother         pacemaker    Coronary artery disease Mother             Hypertension Mother         pulmonary    Hearing loss Mother     Colon cancer Father             Liver cancer Father     Hepatitis Father     Cancer Father          - colon cancer        Review of Systems   Constitutional:  Positive for fatigue. HENT:  Negative for sore throat. Respiratory:  Positive for shortness of breath (asthma). Negative for cough. Cardiovascular:  Negative for chest pain, palpitations and leg swelling. Gastrointestinal:  Negative for abdominal pain, constipation, diarrhea and nausea.        + GERD   Genitourinary:  Negative for dysuria. Musculoskeletal:  Positive for arthralgias. Negative for back pain. Skin:  Negative for rash. Neurological:  Negative for headaches. Psychiatric/Behavioral:  Negative for dysphoric mood. The patient is not nervous/anxious. Objective:  /80   Pulse 82   Ht 5' 5.5" (1.664 m)   Wt 108 kg (238 lb 9.6 oz)   LMP 10/02/2023 (Approximate)   BMI 39.10 kg/m²     Physical Exam  Vitals and nursing note reviewed. Constitutional:       Appearance: Normal appearance. She is obese. HENT:      Head: Normocephalic. Pulmonary:      Effort: Pulmonary effort is normal.   Neurological:      General: No focal deficit present. Mental Status: She is alert and oriented to person, place, and time. Psychiatric:         Mood and Affect: Mood normal.         Behavior: Behavior normal.         Thought Content: Thought content normal.         Judgment: Judgment normal.                Labs and Imaging  Recent labs and imaging have been personally reviewed.   No results found for: "WBC", "HGB", "HCT", "MCV", "PLT"  Lab Results   Component Value Date    SODIUM 136 02/12/2021    K 4.0 02/12/2021     02/12/2021    CO2 20 02/12/2021    BUN 12 02/12/2021    CREATININE 1.04 (H) 02/12/2021    GLUC 84 02/12/2021    CALCIUM 8.8 12/21/2019    AST 13 01/11/2021    ALT 14 01/11/2021    ALKPHOS 94 12/21/2019    TP 7.0 01/11/2021    TBILI <0.2 01/11/2021     Lab Results   Component Value Date    HGBA1C 5.7 (H) 04/06/2023     Lab Results   Component Value Date    TSH 2.660 01/11/2021     Lab Results   Component Value Date    CHOLESTEROL 218 (H) 01/11/2021     Lab Results   Component Value Date    HDL 43 01/11/2021     Lab Results   Component Value Date    TRIG 228 (H) 01/11/2021     Lab Results   Component Value Date    LDLCALC 134 (H) 01/11/2021

## 2023-11-14 NOTE — ASSESSMENT & PLAN NOTE
Blood pressure slightly elevated today  Patient currently on lisinopril 10 mg daily  Would avoid phentermine

## 2023-12-07 ENCOUNTER — OFFICE VISIT (OUTPATIENT)
Dept: BARIATRICS | Facility: CLINIC | Age: 48
End: 2023-12-07

## 2023-12-07 VITALS — WEIGHT: 237.5 LBS | BODY MASS INDEX: 38.17 KG/M2 | HEIGHT: 66 IN

## 2023-12-07 DIAGNOSIS — R63.5 ABNORMAL WEIGHT GAIN: Primary | ICD-10-CM

## 2023-12-07 PROCEDURE — RECHECK

## 2023-12-07 PROCEDURE — WMPRO12

## 2023-12-07 NOTE — PROGRESS NOTES
Weight Management Medical Nutrition Assessment  Alyce, along with her son, is here today for Healthy Core initial visit. Current weight 237. 5#. Patient has been struggling with her weight on and off for many years. She has been logging via [employer] since meeting with KAREN CASTANEDA, averaging 1,200-1,300 kcal and feels satisfied at this level. Provided macronutrient goals and encouraged patient to continue. Per dietary recall, patient has large gaps between meals and a low protein breakfast but otherwise has fairly balanced intake. Discussed benefits of interval eating, adequate protein intake, and mindful eating. Low-calorie meal plan reviewed. Limited with activity due to TKR recovery. Recommend patient begin to incorporate upper body exercise and low-impact exercises like yoga and stretching to increase active time within limitations. Completed a body composition using SECA scale and will review results with patient at Month 1 f/u.      Likes: eggs, nuts, peanut butter, cheese, cottage cheese, tuna     Patient seen by Medical Provider in past 6 months:  yes  Requested to schedule appointment with Medical Provider: No    Anthropometric Measurements  Start Weight (#): 237.5# (23)  Current Weight (#): n/a  TBW % Change from start weight: n/a  Ideal Body Weight (#): 127.5# (65.5")  Goal Weight (#): ST-10% LT-180#    Weight Loss History  Previous weight loss attempts: Commercial Programs ([employer], Sakina Parker, etc.)  Exercise, Self Created Diets (Portion Control, Healthy Food Choices, etc.)    Food and Nutrition Related History  Wake up: 4:30 am   Bed Time: 10-11 pm  Sleep quality: well - but usually 5-6 hours/night   At work by 6:30 am     Dietary Recall  Breakfast (8-9 am): cottage cheese w/ fruit Or 1/2 bagel with cream cheese Or apple slice w/ lite caramel cup Or banana    Snack: -- Or cheese (cheese stick or baby bell) w/ or w/o grapes   Lunch (12 pm): chicken meatballs w/ sauce Or Tiffani sub Or chili Or homemade chicken soup Or chicken + rice Or bologna + cheese sandwich   Snack: --  Will nap 1 hour after work   Dinner (6-8 pm): chicken meatballs w/ sauce Or Tiffani sub Or chili Or homemade chicken soup Or pasta Or protein + vegetable/salad + rice  Snack: --    Beverages: water, unsweetened iced/hot tea  Volume of beverage intake: 48-64 oz water     Weekends: Same  Cravings: none  Trouble area of day: in AM     Frequency of Eating out: 1-2x/month  Food restrictions: none   Cooking: self and boyfriend   Food Shopping: self     Occupation: teacher    Physical Activity  Activity: No formal rotuine (working to return to exercise routine since TKR) [has stationary bike, resistance bands, and weights at home]  Frequency:rarely  Physical limitations/barriers to exercise: mobility limited due to knee pain     Estimated Needs  Energy  SECA: BMR: 1,797 X 1.4 -1,000 = 1,516 kcal     [place] Energy Needs (needs at 238.6#)  BMR: 1,711 kcal  Maintenance calories (sedentary): 2,053 kcal  1-2#/week loss (sedentary): 1,053-1,553 kcal  1-2#/week loss (light activity): 1,353-1,853 kcal    Protein: 70-87 grams (1.2-1.5g/kg IBW)  Fluid: 68 ounces (35mL/kg IBW)    Nutrition Diagnosis  Yes; Overweight/obesity related to Excess energy intake as evidenced by BMI more than normative standard for age and sex (obesity-grade II 35-39. 9)       Nutrition Intervention    Nutrition Prescription  Calories: 1,200-1,600 kcal  Protein: 70-87 g  Fluid: 68+ ounces    Meal Plan (Bryce/Pro)  Breakfast: 200-300/15-20  Snack: 100-150/5+  Lunch: 400/25-30  Snack: 100-150/5+  Dinner: 450-500/30-35  Snack: --    Nutrition Education  Healthy Core Manual   Calorie controlled menu  Lean protein food choices  Healthy snack options  Food journaling tips    Nutrition Counseling  Strategies: meal planning, portion sizes, healthy snack choices, hydration, fiber intake, protein intake, exercise, food logging    Monitoring and Evaluation:    Evaluation criteria  Energy Intake  Meet protein needs  Maintain adequate hydration  Monitor weekly weight  Meal planning/preparation  Food journal   Decreased portions at mealtimes and snacks  Physical activity     Barriers to learning:none  Readiness to change: Preparation:  (Getting ready to change)  and Action:  (Changing behavior)  Comprehension: very good  Expected Compliance: very good

## 2023-12-12 ENCOUNTER — CLINICAL SUPPORT (OUTPATIENT)
Dept: BARIATRICS | Facility: CLINIC | Age: 48
End: 2023-12-12

## 2023-12-12 VITALS — WEIGHT: 236.6 LBS | BODY MASS INDEX: 54.76 KG/M2 | HEIGHT: 55 IN

## 2023-12-12 DIAGNOSIS — R63.5 ABNORMAL WEIGHT GAIN: Primary | ICD-10-CM

## 2023-12-12 PROCEDURE — RECHECK

## 2023-12-14 ENCOUNTER — OFFICE VISIT (OUTPATIENT)
Dept: BARIATRICS | Facility: CLINIC | Age: 48
End: 2023-12-14

## 2023-12-14 VITALS — WEIGHT: 240 LBS | BODY MASS INDEX: 5578.13 KG/M2

## 2023-12-14 DIAGNOSIS — R63.5 ABNORMAL WEIGHT GAIN: Primary | ICD-10-CM

## 2023-12-14 PROCEDURE — RECHECK

## 2023-12-14 NOTE — PROGRESS NOTES
Patient presents for 30 minute Behavioral Health Evaluation as a part of Medical Weight Management Program, current weight 240lbs. Patient still has a free 60min appointment with SW available to use when needed. Eating behaviors/food choices: Reports history of eating well, met with dietitian in the past and has a good base knowledge of food but may not notice some mindless or emotional eating. She has three meals a day, says she's getting about 6080-9477 calories, denies emotional eating but may have chocolate or soda to combat fatigue midday. Says she's [place] so she does enjoy pasta, higher calorie italian dishes and making more extravagant meals with her boyfriend on the weekend. Encouraged patient to reflect on reasons for eating as much as what and how much she is eating. Activity/Exercise:  Patient has traditionally been active but due to knee injury and then replacement she has not been able to workout like she wants to. She is almost one year post knee replacement so she feels stronger to start working out, she just joined the gym and plans to go four days a week. Discussed creating a routine that is sustainable, she recognizes she goes intensely into goals she wants to meet, encouraged to evaluate the changes that she makes as to whether she can continue them for long term success. Sleep/Rest:  patient reports getting about 4-5 hrs of sleep per night, goes to sleep around 11:30pm and wakes around 4:30am for work. She feels she can't go to sleep earlier because she has so much to do, reviewed sleep as an investment in self care as much as getting activity and quality nutrition. She takes naps midday but she may also have chocolate or a soda to perk herself up. Identify this as fatigue eating which is a body stress, suggested she evaluate her sleep patterns if her weight is not responding to the dietary and exercise efforts she's making.     Mental Health/Wellness:  Patient denies any mood disruptions or stressors, she says she doesn't usually feel any stress. Reviewed stress as a broad term that can be unrelated to work or home environment but rather those ways she may be overworking herself. Current routines may be overstimulating and overexerting her in a way she's not recognizing, suggested she take time to self reflect, consider journaling or talking with those she trusts about ways she may need to step back and invest more time in herself.     Next Appointment:  with MELLISA on 12/21

## 2023-12-19 ENCOUNTER — CLINICAL SUPPORT (OUTPATIENT)
Dept: BARIATRICS | Facility: CLINIC | Age: 48
End: 2023-12-19

## 2023-12-19 VITALS — WEIGHT: 236.6 LBS | HEIGHT: 66 IN | BODY MASS INDEX: 38.02 KG/M2

## 2023-12-19 VITALS — WEIGHT: 233.9 LBS | HEIGHT: 66 IN | BODY MASS INDEX: 37.59 KG/M2

## 2023-12-19 DIAGNOSIS — R63.5 ABNORMAL WEIGHT GAIN: Primary | ICD-10-CM

## 2023-12-19 PROCEDURE — RECHECK

## 2023-12-19 NOTE — PROGRESS NOTES
"Weight Management Medical Nutrition Assessment  Alyce is here today for Healthy Core Month 1 f/u. Current Weight: 235.1#. Patient has lost 2.4# x 2 weeks. Patient continues with logging. Recommend patient begin focusing on protein goal of 80 grams/day to ensure adequate intake. Joined gym and went last week, starting slow with cardio post TKR. Will continue to increase and incorporate strength training. SECA body comp reviewed. Goal of maintenance over holidays and vacation was set. Patient will return 23 for classes. Keep up the good work    Likes: eggs, nuts, peanut butter, cheese, cottage cheese, tuna     Patient seen by Medical Provider in past 6 months:  yes  Requested to schedule appointment with Medical Provider: No    Anthropometric Measurements  Start Weight (#): 237.5# (23)  Current Weight (#): 235.1#  TBW % Change from start weight: 1%  Ideal Body Weight (#): 127.5# (65.5\")  Goal Weight (#): ST-10% LT-180#    Weight Loss History  Previous weight loss attempts: Commercial Programs (Weight Watchers, [employer], etc.)  Exercise, Self Created Diets (Portion Control, Healthy Food Choices, etc.)    Food and Nutrition Related History  Wake up: 4:30 am   Bed Time: 10-11 pm  Sleep quality: well - but usually 5-6 hours/night   At work by 6:30 am     Dietary Recall  Breakfast (8-9 am): cottage cheese w/ fruit Or 1/2 bagel with cream cheese Or apple slice w/ lite caramel cup Or banana    Snack: -- Or cheese (cheese stick or baby bell) w/ or w/o grapes   Lunch (12 pm): chicken meatballs w/ sauce Or Tiffani sub Or chili Or homemade chicken soup Or chicken + rice Or bologna + cheese sandwich   Snack: --  Will nap 1 hour after work   Dinner (6-8 pm): chicken meatballs w/ sauce Or Tiffani sub Or chili Or homemade chicken soup Or pasta Or protein + vegetable/salad + rice  Snack: --    Beverages: water, unsweetened iced/hot tea  Volume of beverage intake: 48-64 oz water     Weekends: Same  Cravings: " none  Trouble area of day: in AM     Frequency of Eating out: 1-2x/month  Food restrictions: none   Cooking: self and boyfriend   Food Shopping: self     Occupation: teacher    Physical Activity  Activity: Has joined gym and is starting slow with treadmill and stationary bike (working to return to exercise routine since TKR) [has stationary bike, resistance bands, and weights at home]  Frequency: several times per week   Physical limitations/barriers to exercise: mobility limited due to knee pain     Estimated Needs  Energy  SECA: BMR: 1,797 X 1.4 -1,000 = 1,516 kcal     [place] [employer] Energy Needs (needs at 238.6#)  BMR: 1,711 kcal  Maintenance calories (sedentary): 2,053 kcal  1-2#/week loss (sedentary): 1,053-1,553 kcal  1-2#/week loss (light activity): 1,353-1,853 kcal    Protein: 70-87 grams (1.2-1.5g/kg IBW)  Fluid: 68 ounces (35mL/kg IBW)    Nutrition Diagnosis  Yes;    Overweight/obesity related to Excess energy intake as evidenced by BMI more than normative standard for age and sex (obesity-grade II 35-39.9)     Nutrition Intervention    Nutrition Prescription  Calories: 1,200-1,600 kcal  Protein: 70-87 g  Fluid: 68+ ounces    Meal Plan (Bryce/Pro)  Breakfast: 200-300/15-20  Snack: 100-150/5+  Lunch: 400/25-30  Snack: 100-150/5+  Dinner: 450-500/30-35  Snack: --    Nutrition Education  Healthy Core Manual   Calorie controlled menu  Lean protein food choices  Healthy snack options  Food journaling tips    Nutrition Counseling  Strategies: meal planning, portion sizes, healthy snack choices, hydration, fiber intake, protein intake, exercise, food logging    Monitoring and Evaluation:    Evaluation criteria  Energy Intake  Meet protein needs  Maintain adequate hydration  Monitor weekly weight  Meal planning/preparation  Food journal   Decreased portions at mealtimes and snacks  Physical activity     Barriers to learning:none  Readiness to change: Action:  (Changing behavior)  Comprehension: very good  Expected  Compliance: very good

## 2023-12-21 ENCOUNTER — OFFICE VISIT (OUTPATIENT)
Dept: BARIATRICS | Facility: CLINIC | Age: 48
End: 2023-12-21

## 2023-12-21 VITALS — BODY MASS INDEX: 37.78 KG/M2 | WEIGHT: 235.1 LBS | HEIGHT: 66 IN

## 2023-12-21 DIAGNOSIS — R63.5 ABNORMAL WEIGHT GAIN: Primary | ICD-10-CM

## 2023-12-21 PROCEDURE — RECHECK

## 2024-01-05 ENCOUNTER — APPOINTMENT (OUTPATIENT)
Dept: RADIOLOGY | Facility: CLINIC | Age: 49
End: 2024-01-05
Payer: COMMERCIAL

## 2024-01-05 ENCOUNTER — OFFICE VISIT (OUTPATIENT)
Dept: OBGYN CLINIC | Facility: CLINIC | Age: 49
End: 2024-01-05
Payer: COMMERCIAL

## 2024-01-05 VITALS
HEART RATE: 82 BPM | WEIGHT: 237 LBS | SYSTOLIC BLOOD PRESSURE: 127 MMHG | TEMPERATURE: 97 F | BODY MASS INDEX: 38.84 KG/M2 | DIASTOLIC BLOOD PRESSURE: 80 MMHG

## 2024-01-05 DIAGNOSIS — Z96.652 STATUS POST TOTAL KNEE REPLACEMENT NOT USING CEMENT, LEFT: Primary | ICD-10-CM

## 2024-01-05 DIAGNOSIS — Z96.652 STATUS POST TOTAL KNEE REPLACEMENT NOT USING CEMENT, LEFT: ICD-10-CM

## 2024-01-05 PROCEDURE — 99214 OFFICE O/P EST MOD 30 MIN: CPT | Performed by: ORTHOPAEDIC SURGERY

## 2024-01-05 PROCEDURE — 73562 X-RAY EXAM OF KNEE 3: CPT

## 2024-01-05 RX ORDER — AMOXICILLIN 500 MG/1
2000 TABLET, FILM COATED ORAL
Qty: 4 TABLET | Refills: 2 | Status: SHIPPED | OUTPATIENT
Start: 2024-01-05 | End: 2024-04-04

## 2024-01-05 NOTE — PROGRESS NOTES
Assessment/Plan:  1. Status post total knee replacement not using cement, left  XR knee 3 vw left non injury    amoxicillin (AMOXIL) 500 MG tablet        Scribe Attestation      I,:  Tomy Cadena PA-C am acting as a scribe while in the presence of the attending physician.:       I,:  Jermaine Del Toro, DO personally performed the services described in this documentation    as scribed in my presence.:           Alyce is a very pleasant 48-year-old female presenting today for follow-up 1 year after a robotic assisted press-fit left total knee arthroplasty.  She is doing exceptionally well at this time.  The prosthesis is stable on imaging and exam.  She is pain-free with activities of daily living and enjoyment and does not have any restrictions.  Therefore, she can now follow-up on an as-needed basis.  She does understand the need for antibiotic prophylaxis before any dental appointments, and a prescription was sent for her today.  All of her questions were addressed today    Subjective: 1 year status post robotic assisted left total knee arthroplasty    Patient ID: Alyce Oliva is a 48 y.o. female presenting today for follow-up 1 year after a left total knee arthroplasty.  She is doing very well at this time.  She does not have any activity related pain in the left knee on a daily basis.  She has returned to playing pickle ball and doing other activities of daily living and enjoyment.  She has been compliant with antibiotic prophylaxis for any dental appointments.  She denies any new injuries or symptoms    Review of Systems   Constitutional: Negative.    HENT: Negative.     Eyes: Negative.    Respiratory: Negative.     Cardiovascular: Negative.    Gastrointestinal: Negative.    Endocrine: Negative.    Genitourinary: Negative.    Musculoskeletal: Negative.    Skin: Negative.    Allergic/Immunologic: Negative.    Neurological: Negative.    Hematological: Negative.    Psychiatric/Behavioral: Negative.       Past  Medical History:   Diagnosis Date    Arthritis     Asthma     Bronchitis 2023    Contact lens/glasses fitting     Family history of colon cancer     hep C-liver cancer- to colon cancer-father    GERD (gastroesophageal reflux disease)     Obesity (BMI 30.0-34.9)      Past Surgical History:   Procedure Laterality Date     SECTION      ,     DILATION AND CURETTAGE OF UTERUS      miscarriage    EGD      ME ARTHRP KNE CONDYLE&PLATU MEDIAL&LAT COMPARTMENTS Left 2023    Procedure: ARTHROPLASTY KNEE TOTAL W ROBOT - SAME DAY - PRESS FIT - LEFT;  Surgeon: Jermaine Del Toro DO;  Location: [facility];  Service: Orthopedics    ROTATOR CUFF REPAIR Right     SHOULDER SURGERY      TONSILLECTOMY      TUBAL LIGATION         Family History   Problem Relation Age of Onset    Heart disease Mother         pacemaker    Coronary artery disease Mother             Hypertension Mother         pulmonary    Hearing loss Mother     Colon cancer Father             Liver cancer Father     Hepatitis Father     Cancer Father          - colon cancer       Social History     Occupational History    Not on file   Tobacco Use    Smoking status: Never     Passive exposure: Past    Smokeless tobacco: Never   Vaping Use    Vaping status: Never Used   Substance and Sexual Activity    Alcohol use: Yes     Alcohol/week: 2.0 standard drinks of alcohol     Types: 2 Standard drinks or equivalent per week     Comment: social    Drug use: Never    Sexual activity: Yes     Partners: Male     Birth control/protection: OCP, Female Sterilization         Current Outpatient Medications:     amoxicillin (AMOXIL) 500 MG tablet, Take 4 tablets (2,000 mg total) by mouth 60 minutes pre-procedure, Disp: 4 tablet, Rfl: 2    cetirizine (ZyrTEC) 10 mg tablet, Take 10 mg by mouth daily, Disp: , Rfl:     famotidine (PEPCID) 20 mg tablet, Take 1 tablet (20 mg total) by mouth 2 (two) times a day, Disp: 60 tablet, Rfl: 11     Fluticasone Furoate-Vilanterol (Breo Ellipta) 100-25 mcg/actuation inhaler, Inhale 1 puff daily Rinse mouth after use., Disp: 60 blister, Rfl: 6    ketoconazole (NIZORAL) 2 % cream, APPLY TO RASH AREAS AROUND EYES EVERY MORNING, MIXED WITH PIMECROLIMUS., Disp: , Rfl:     ketoconazole (NIZORAL) 2 % shampoo, , Disp: , Rfl:     lansoprazole (PREVACID) 30 mg capsule, as needed  , Disp: , Rfl:     Levonorgest-Eth Estrad 91-Day 0.15-0.03 &0.01 MG TABS, Take 1 tablet by mouth every morning  , Disp: , Rfl: 2    lisinopril (ZESTRIL) 10 mg tablet, TAKE 1 TABLET BY MOUTH EVERY DAY, Disp: 90 tablet, Rfl: 1    meloxicam (MOBIC) 15 mg tablet, Take 15 mg by mouth daily As directed, Disp: , Rfl:     montelukast (SINGULAIR) 10 mg tablet, Take 10 mg by mouth daily, Disp: , Rfl:     pimecrolimus (ELIDEL) 1 % cream, MIX WITH KETOCONAZOLE CREAM BEFORE APPLYING TO FACE TWICE DAILY, Disp: , Rfl:     No Known Allergies    Objective:  Vitals:    01/05/24 1546   BP: 127/80   Pulse: 82   Temp: (!) 97 °F (36.1 °C)       Body mass index is 38.84 kg/m².    Left Knee Exam     Muscle Strength   The patient has normal left knee strength.    Tenderness   The patient is experiencing no tenderness.     Range of Motion   Extension:  0 normal   Flexion:  130 normal     Tests   Varus: negative Valgus: negative  Drawer:  Anterior - negative     Posterior - negative    Other   Erythema: absent  Scars: present  Sensation: normal  Pulse: present  Swelling: none  Effusion: no effusion present    Comments:  Vertical midline incision is well-healed without erythema.    Patella tracks midline and flat without crepitance  Stable at 0, 30, 90  Thigh and calf soft and nontender  Grossly NVI  Ambulates with normal symmetrical gait          Observations   Left Knee   Negative for effusion.       Physical Exam  Vitals and nursing note reviewed.   Constitutional:       Appearance: Normal appearance. She is well-developed.      Comments: Body mass index is 38.84 kg/m².    HENT:      Head: Normocephalic and atraumatic.      Right Ear: External ear normal.      Left Ear: External ear normal.   Eyes:      Extraocular Movements: Extraocular movements intact.      Conjunctiva/sclera: Conjunctivae normal.   Cardiovascular:      Rate and Rhythm: Normal rate.      Pulses: Normal pulses.   Pulmonary:      Effort: Pulmonary effort is normal.   Musculoskeletal:      Cervical back: Normal range of motion.      Left knee: No effusion.      Comments: See ortho exam   Skin:     General: Skin is warm and dry.   Neurological:      General: No focal deficit present.      Mental Status: She is alert and oriented to person, place, and time. Mental status is at baseline.   Psychiatric:         Mood and Affect: Mood normal.         Behavior: Behavior normal.         Thought Content: Thought content normal.         Judgment: Judgment normal.       I have personally reviewed pertinent films in PACS of the x-rays taken today of her left knee compared them to previous postoperative films.  There is been no change in alignment of the press-fit total knee prosthesis that remains well aligned and well-fixed.  There is no signs of loosening, periprosthetic fracture, other interval complication    This document was created using speech voice recognition software.   Grammatical errors, random word insertions, pronoun errors, and incomplete sentences are an occasional consequence of this system due to software limitations, ambient noise, and hardware issues.   Any formal questions or concerns about content, text, or information contained within the body of this dictation should be directly addressed to the provider for clarification.

## 2024-01-16 ENCOUNTER — CLINICAL SUPPORT (OUTPATIENT)
Dept: BARIATRICS | Facility: CLINIC | Age: 49
End: 2024-01-16

## 2024-01-16 VITALS — HEIGHT: 65 IN | BODY MASS INDEX: 38.84 KG/M2 | WEIGHT: 233.1 LBS

## 2024-01-16 DIAGNOSIS — R63.5 ABNORMAL WEIGHT GAIN: Primary | ICD-10-CM

## 2024-01-16 PROCEDURE — RECHECK

## 2024-01-30 ENCOUNTER — CLINICAL SUPPORT (OUTPATIENT)
Dept: BARIATRICS | Facility: CLINIC | Age: 49
End: 2024-01-30

## 2024-01-30 VITALS — HEIGHT: 65 IN | WEIGHT: 233.6 LBS | BODY MASS INDEX: 38.92 KG/M2

## 2024-01-30 DIAGNOSIS — R63.5 ABNORMAL WEIGHT GAIN: Primary | ICD-10-CM

## 2024-01-30 PROCEDURE — RECHECK

## 2024-02-01 ENCOUNTER — OFFICE VISIT (OUTPATIENT)
Dept: BARIATRICS | Facility: CLINIC | Age: 49
End: 2024-02-01

## 2024-02-01 VITALS
SYSTOLIC BLOOD PRESSURE: 124 MMHG | HEIGHT: 66 IN | BODY MASS INDEX: 37.9 KG/M2 | DIASTOLIC BLOOD PRESSURE: 78 MMHG | HEART RATE: 77 BPM | WEIGHT: 235.8 LBS

## 2024-02-01 DIAGNOSIS — E66.01 CLASS 2 SEVERE OBESITY DUE TO EXCESS CALORIES WITH SERIOUS COMORBIDITY AND BODY MASS INDEX (BMI) OF 39.0 TO 39.9 IN ADULT: Primary | ICD-10-CM

## 2024-02-01 RX ORDER — TIRZEPATIDE 2.5 MG/.5ML
2.5 INJECTION, SOLUTION SUBCUTANEOUS WEEKLY
Qty: 2 ML | Refills: 0 | Status: SHIPPED | OUTPATIENT
Start: 2024-02-01 | End: 2024-02-29

## 2024-02-01 NOTE — PATIENT INSTRUCTIONS
Zepbound Instructions:    - Begin Zepbound 2.5 mg subcutaneously once a week. Dose changes may occur after 4 doses if medication is tolerated. You will be assessed prior to each dose change to make sure you are tolerating the medication well.  - Please message me when you have 2 pens left from the prescription so there are no lapses in treatment.  - Visit [email or web address] for further information/injection instructions.   -Please eat small frequent meals to help reduce nausea. Lemon water and saltine crackers may help with this.   - Side effects of Zepbound discussed: nausea, vomiting, diarrhea, and constipation. If you experience fever, nausea/vomiting, and pain radiating to your back this may be a sign of pancreatitis. Please go to the emergency room if this occurs.  - If on oral birth control a 2nd method of birth control is recommended during the 1st 8 weeks of therapy and for 4 weeks after any dosage change.

## 2024-02-01 NOTE — ASSESSMENT & PLAN NOTE
- Patient is pursuing Conservative Program and follow up visits with medical weight management provider  - Initial weight loss goal of 5-10% weight loss for improved health  - Weight is not at goal and patient tried for more than 6 months to lose weight using various programs and tools, they were unable to achieve a meaningful weight loss above 5%  -Patient lifestyle habits were reviewed and barriers to weight loss were addressed today.  Patient was encouraged to continue with the healthy core program for accountability and guidance to better balance her nutrition plan.  Patient will continue to attempt to avoid skipping meals and more evenly balance her calories throughout the day.  Activity level was discussed and patient will attempt to incorporate 2 days of exercise at the gym in the next month.    Medications were reviewed and due to supply concerns with [employer] she  will pursue Zepbound instead.  Pen was demonstrated in the office today and all questions were answered.  Zepbound Instructions:    - Begin Zepbound 2.5 mg subcutaneously once a week. Dose changes may occur after 4 doses if medication is tolerated. You will be assessed prior to each dose change to make sure you are tolerating the medication well.  - Please message me when you have 2 pens left from the prescription so there are no lapses in treatment.  - Visit [email or web address] for further information/injection instructions.   -Please eat small frequent meals to help reduce nausea. Lemon water and saltine crackers may help with this.   - Side effects of Zepbound discussed: nausea, vomiting, diarrhea, and constipation. If you experience fever, nausea/vomiting, and pain radiating to your back this may be a sign of pancreatitis. Please go to the emergency room if this occurs.  - If on oral birth control a 2nd method of birth control is recommended during the 1st 8 weeks of therapy and for 4 weeks after any dosage change.   - Patient understands the side  effects of the medication and proper administration. Patient agrees with the treatment plan and all questions were answered.       Goals:  Calorie Goal: 3455-8754 calories per day  Do not skip meals.  Food log via chace or provided paper log (chace options include www.AdTaily.com.com, sparkpeople.com, loseit.com, calorieking.com, Placements.io)  No sugary beverages.   At least 64oz of water daily.  Increase physical activity by 10 minutes daily. Gradually increase physical activity to a goal of 5 days per week for 30 minutes of MODERATE intensity PLUS 2 days per week of FULL BODY resistance training

## 2024-02-01 NOTE — PROGRESS NOTES
Assessment/Plan:     Class 2 severe obesity due to excess calories with serious comorbidity and body mass index (BMI) of 39.0 to 39.9 in adult   - Patient is pursuing Conservative Program and follow up visits with medical weight management provider  - Initial weight loss goal of 5-10% weight loss for improved health  - Weight is not at goal and patient tried for more than 6 months to lose weight using various programs and tools, they were unable to achieve a meaningful weight loss above 5%  -Patient lifestyle habits were reviewed and barriers to weight loss were addressed today.  Patient was encouraged to continue with the healthy core program for accountability and guidance to better balance her nutrition plan.  Patient will continue to attempt to avoid skipping meals and more evenly balance her calories throughout the day.  Activity level was discussed and patient will attempt to incorporate 2 days of exercise at the gym in the next month.    Medications were reviewed and due to supply concerns with Wegovy she  will pursue Zepbound instead.  Pen was demonstrated in the office today and all questions were answered.  Zepbound Instructions:    - Begin Zepbound 2.5 mg subcutaneously once a week. Dose changes may occur after 4 doses if medication is tolerated. You will be assessed prior to each dose change to make sure you are tolerating the medication well.  - Please message me when you have 2 pens left from the prescription so there are no lapses in treatment.  - Visit [email or web address] for further information/injection instructions.   -Please eat small frequent meals to help reduce nausea. Lemon water and saltine crackers may help with this.   - Side effects of Zepbound discussed: nausea, vomiting, diarrhea, and constipation. If you experience fever, nausea/vomiting, and pain radiating to your back this may be a sign of pancreatitis. Please go to the emergency room if this occurs.  - If on oral birth control a 2nd method  of birth control is recommended during the 1st 8 weeks of therapy and for 4 weeks after any dosage change.   - Patient understands the side effects of the medication and proper administration. Patient agrees with the treatment plan and all questions were answered.       Goals:  Calorie Goal: 7561-9462 calories per day  Do not skip meals.  Food log via chace or provided paper log (chace options include www.myfitnesspal.com, sparkpeople.com, loseit.com, calorieking.com, Play Megaphone)  No sugary beverages.   At least 64oz of water daily.  Increase physical activity by 10 minutes daily. Gradually increase physical activity to a goal of 5 days per week for 30 minutes of MODERATE intensity PLUS 2 days per week of FULL BODY resistance training          Alyce was seen today for follow-up.    Diagnoses and all orders for this visit:    Class 2 severe obesity due to excess calories with serious comorbidity and body mass index (BMI) of 39.0 to 39.9 in adult   -     tirzepatide (Zepbound) 2.5 mg/0.5 mL auto-injector; Inject 0.5 mL (2.5 mg total) under the skin once a week for 28 days      Patient denies personal history of pancreatitis. Patient also denies personal and family history of medullary thyroid cancer and multiple endocrine neoplasia type 2 (MEN 2 tumor).   Patient maintains on oral birth control for pregnancy prevention.    Total time spent reviewing chart, interviewing patient, examining patient, discussing plan, answering all questions, and documentin minutes with >50% face-to-face time with the patient.    Follow up in approximately 2 months with Non-Surgical Physician/Advanced Practitioner.    Subjective:   Chief Complaint   Patient presents with    Follow-up     Pt is here for MWM f/u       Patient ID: Alyce Oliva  is a 48 y.o. female with excess weight/obesity here to pursue weight management.  Patient is pursuing HealthyCORE-Intensive Lifestyle Intervention Program.   Most recent notes and records were  reviewed.    HPI    Wt Readings from Last 20 Encounters:   02/01/24 107 kg (235 lb 12.8 oz)   01/30/24 106 kg (233 lb 9.6 oz)   01/16/24 106 kg (233 lb 1.6 oz)   01/05/24 108 kg (237 lb)   12/21/23 107 kg (235 lb 1.6 oz)   12/19/23 106 kg (233 lb 14.4 oz)   12/14/23 109 kg (240 lb)   12/12/23 107 kg (236 lb 9.6 oz)   12/07/23 108 kg (237 lb 8 oz)   11/14/23 108 kg (238 lb 9.6 oz)   10/19/23 105 kg (232 lb)   10/11/23 104 kg (230 lb)   06/07/23 104 kg (230 lb)   04/19/23 104 kg (230 lb)   03/14/23 104 kg (230 lb)   02/22/23 103 kg (226 lb 12.8 oz)   02/08/23 103 kg (228 lb)   01/25/23 106 kg (234 lb)   01/09/23 106 kg (234 lb)   12/13/22 106 kg (234 lb)       Patient presents today to medical weight management office for follow up.  Patient has continued to participate in the healthy core program and feels her nutrition is much better balance and had been before.  Patient still struggles at times with lack of hunger and skipping meals.  She does try to be mindful of her portion control and protein intake so when she does eat it is nutritionally balanced.  She is also tracking her foods regularly.  Patient had body comp test done and was found to have high muscle mass.  Patient was prescribed Wegovy at last office visit but was unable to start therapy due to supply concerns.  Patient would still be willing to try GLP-1 medications to help with her weight loss journey.    Weight loss medication and dose: none  Started weight and date:  238.6 lbs  Current weight: 235.8 lbs  Difference: -2.8 lbs  Goal weight: 170-180 lbs ultimately    Starting BMI: 39.10 in 11/2023  Current BMI: 38.06    Waist Measurements:  11/2023: 42.5 in  2/2024: 43 in    Nutrition Prescription  Calories: 1,200-1,600 kcal  Protein: 70-87 g  Fluid: 68+ ounces    Diet recall:  B: cottage cheese with fruit OR 1/2 bagel with CC  S: cubes and cheese and grapes OR skips  L: chicken meatballs with sauce OR Tiffani sub OR chili OR homemade chicken noodle  "soup  S: no  D: vary - skips sometimes OR chicken meatballs with sauce OR Tiffani sub OR chili OR homemade chicken noodle soup OR pasta OR steak with salad OR chicken with vegetables  S: no    Hydration: water - 48-64oz, unsweetened iced tea, hot tea  Alcohol: no - rarely  Smoking: no  Exercise: none right now  Occupation: teacher  Sleep: well but only 4-5 hours  STOP ban/8      Colonoscopy:   Mammogram: 2023      The following portions of the patient's history were reviewed and updated as appropriate: allergies, current medications, past family history, past medical history, past social history, past surgical history, and problem list.    Family History   Problem Relation Age of Onset    Heart disease Mother         pacemaker    Coronary artery disease Mother             Hypertension Mother         pulmonary    Hearing loss Mother     Colon cancer Father             Liver cancer Father     Hepatitis Father     Cancer Father          - colon cancer        Review of Systems   Constitutional:  Negative for fatigue.   HENT:  Negative for sore throat.    Respiratory:  Negative for cough and shortness of breath.    Cardiovascular:  Negative for chest pain, palpitations and leg swelling.   Gastrointestinal:  Negative for abdominal pain, constipation, diarrhea and nausea.   Genitourinary:  Negative for dysuria.   Musculoskeletal:  Negative for arthralgias and back pain.   Skin:  Negative for rash.   Neurological:  Negative for headaches.   Psychiatric/Behavioral:  Negative for dysphoric mood. The patient is not nervous/anxious.        Objective:  /78   Pulse 77   Ht 5' 6\" (1.676 m)   Wt 107 kg (235 lb 12.8 oz)   LMP 2024 (Approximate)   BMI 38.06 kg/m²     Physical Exam  Vitals and nursing note reviewed.   Constitutional:       Appearance: Normal appearance. She is obese.   HENT:      Head: Normocephalic.   Pulmonary:      Effort: Pulmonary effort is normal.   Neurological: "      General: No focal deficit present.      Mental Status: She is alert and oriented to person, place, and time.   Psychiatric:         Mood and Affect: Mood normal.         Behavior: Behavior normal.         Thought Content: Thought content normal.         Judgment: Judgment normal.            Labs   Most recent labs reviewed   Lab Results   Component Value Date    SODIUM 136 02/12/2021    K 4.0 02/12/2021     02/12/2021    CO2 20 02/12/2021    BUN 12 02/12/2021    CREATININE 1.04 (H) 02/12/2021    GLUC 84 02/12/2021    CALCIUM 8.8 12/21/2019    AST 13 01/11/2021    ALT 14 01/11/2021    ALKPHOS 94 12/21/2019    TP 7.0 01/11/2021    TBILI <0.2 01/11/2021     Lab Results   Component Value Date    HGBA1C 5.7 (H) 04/06/2023     Lab Results   Component Value Date    TSH 2.660 01/11/2021     Lab Results   Component Value Date    CHOLESTEROL 218 (H) 01/11/2021     Lab Results   Component Value Date    HDL 43 01/11/2021     Lab Results   Component Value Date    TRIG 228 (H) 01/11/2021     Lab Results   Component Value Date    LDLCALC 134 (H) 01/11/2021

## 2024-02-02 ENCOUNTER — TELEPHONE (OUTPATIENT)
Dept: BARIATRICS | Facility: CLINIC | Age: 49
End: 2024-02-02

## 2024-02-02 ENCOUNTER — TELEPHONE (OUTPATIENT)
Dept: PULMONOLOGY | Facility: CLINIC | Age: 49
End: 2024-02-02

## 2024-02-02 NOTE — TELEPHONE ENCOUNTER
PA for Zepbound     Submitted via  []CMM-KEY    [x]Surescripts-Case ID #    []Faxed to plan   []Other website    []Phone call Case ID #      Office notes sent, clinical questions answered. Awaiting determination

## 2024-02-05 NOTE — TELEPHONE ENCOUNTER
PA for Zepbound Denied    Reason:        Message sent to office clinical pool Yes    Denial letter scanned into Media Yes    Appeal started No

## 2024-02-06 ENCOUNTER — CLINICAL SUPPORT (OUTPATIENT)
Dept: BARIATRICS | Facility: CLINIC | Age: 49
End: 2024-02-06

## 2024-02-06 ENCOUNTER — TELEPHONE (OUTPATIENT)
Dept: BARIATRICS | Facility: CLINIC | Age: 49
End: 2024-02-06

## 2024-02-06 VITALS — BODY MASS INDEX: 39.27 KG/M2 | HEIGHT: 65 IN | WEIGHT: 235.7 LBS

## 2024-02-06 DIAGNOSIS — R63.5 ABNORMAL WEIGHT GAIN: Primary | ICD-10-CM

## 2024-02-06 PROCEDURE — RECHECK

## 2024-02-06 NOTE — TELEPHONE ENCOUNTER
ADAR form sent to patient for signature for Zepbound appeal using [email or web address].    After receiving signed Authorization of Designated  form from pt will fax form with recent chart notes and labs.

## 2024-02-06 NOTE — TELEPHONE ENCOUNTER
Spoke with pt regarding ADAR sig on form. Pt stated she will be in office this evening and will bring signed form to office.

## 2024-02-13 ENCOUNTER — CLINICAL SUPPORT (OUTPATIENT)
Dept: BARIATRICS | Facility: CLINIC | Age: 49
End: 2024-02-13

## 2024-02-13 DIAGNOSIS — R63.5 ABNORMAL WEIGHT GAIN: Primary | ICD-10-CM

## 2024-02-13 PROCEDURE — RECHECK

## 2024-02-14 VITALS — HEIGHT: 65 IN | BODY MASS INDEX: 39.22 KG/M2

## 2024-02-20 ENCOUNTER — CLINICAL SUPPORT (OUTPATIENT)
Dept: BARIATRICS | Facility: CLINIC | Age: 49
End: 2024-02-20

## 2024-02-20 VITALS — HEIGHT: 65 IN | BODY MASS INDEX: 38.4 KG/M2 | WEIGHT: 230.5 LBS

## 2024-02-20 DIAGNOSIS — R63.5 ABNORMAL WEIGHT GAIN: Primary | ICD-10-CM

## 2024-02-20 PROCEDURE — RECHECK

## 2024-02-26 DIAGNOSIS — E66.01 CLASS 2 SEVERE OBESITY DUE TO EXCESS CALORIES WITH SERIOUS COMORBIDITY AND BODY MASS INDEX (BMI) OF 39.0 TO 39.9 IN ADULT: Primary | ICD-10-CM

## 2024-02-26 RX ORDER — TIRZEPATIDE 5 MG/.5ML
5 INJECTION, SOLUTION SUBCUTANEOUS WEEKLY
Qty: 2 ML | Refills: 1 | Status: SHIPPED | OUTPATIENT
Start: 2024-02-26 | End: 2024-04-22

## 2024-02-27 ENCOUNTER — CLINICAL SUPPORT (OUTPATIENT)
Dept: BARIATRICS | Facility: CLINIC | Age: 49
End: 2024-02-27

## 2024-02-27 VITALS — WEIGHT: 231.9 LBS | BODY MASS INDEX: 38.64 KG/M2 | HEIGHT: 65 IN

## 2024-02-27 DIAGNOSIS — R63.5 ABNORMAL WEIGHT GAIN: Primary | ICD-10-CM

## 2024-02-27 PROCEDURE — RECHECK

## 2024-02-29 NOTE — PROGRESS NOTES
"Weight Management Medical Nutrition Assessment  Alyce is here today for Healthy Core Month 2 f/u. Current Weight: 233#. Patient has lost 2.1# x 2 months and overall has lost 4.5#. She has been utilizing Zepbound medication and has struggled with sufficient intake. Continues with logging but is averaging 800-1,000 kcal/day and 40 g protein. Suggested patient utilize ND meal replacements to supplement breakfast meal and increase protein intake but patient feels she will be able to increase intake with homemade shake or cottage cheese. Reviewed protein and calorie needs for weight loss. Recommend patient aim for at least 1,000 kcal and 70 g protein daily. States she has more extravagant meals on the weekend so her intake is higher. Encouraged patient to focus on high protein snacks when she is able to snack. Has not incorporated activity. Feels time and self care is barrier. Fluid intake is not affected by increase feelings of fullness. She will begin adding at least one protein shake or bar/day. Will f/u x 1 month for SECA and REE testing.    Likes: eggs, nuts, peanut butter, cheese, cottage cheese, tuna     Patient seen by Medical Provider in past 6 months:  yes  Requested to schedule appointment with Medical Provider: No    Anthropometric Measurements  Start Weight (#): 237.5# (23)  Current Weight (#): 233#  TBW % Change from start weight: 2%  Ideal Body Weight (#): 127.5# (65.5\")  Goal Weight (#): ST-10% LT-180#    Weight Loss History  Previous weight loss attempts: Commercial Programs (Weight Watchers, mEgo, etc.)  Exercise, Self Created Diets (Portion Control, Healthy Food Choices, etc.)    Food and Nutrition Related History  Wake up: 4:30 am   Bed Time: 10-11 pm  Sleep quality: well - but usually 5-6 hours/night   At work by 6:30 am     Dietary Recall  Breakfast (8-9 am): 1/2 english muffin + peanut butter Or fruit Or cottage cheese w/ fruit (reduced intake lately but will begin incorporating " again)  Snack: --  Lunch (12 pm): homemade chicken soup or chili with rice Or chicken + rice Or bologna + cheese sandwich  Snack: --  Will nap 1 hour after work  Dinner (6-8 pm): chicken meatballs w/ sauce Or chili Or homemade chicken soup Or pasta Or protein + vegetable/salad + rice (reduced portions at dinner)   Snack: --    Beverages: water, unsweetened iced/hot tea  Volume of beverage intake: 48-64 oz water    Weekends: Same  Cravings: none  Trouble area of day: in AM     Frequency of Eating out: 1-2x/month  Food restrictions: none   Cooking: self and boyfriend   Food Shopping: self     Occupation: teacher    Physical Activity  Activity: No formal routine (has gym membership) [has stationary bike, resistance bands, and weights at home]  Frequency: rarely  Physical limitations/barriers to exercise: mobility limited due to knee pain     Estimated Needs  Energy  SECA: BMR: 1,797 X 1.4 -1,000 = 1,516 kcal     [facility] Energy Needs (needs at 238.6#)  BMR: 1,711 kcal  Maintenance calories (sedentary): 2,053 kcal  1-2#/week loss (sedentary): 1,053-1,553 kcal  1-2#/week loss (light activity): 1,353-1,853 kcal    Protein: 70-87 grams (1.2-1.5g/kg IBW)  Fluid: 68 ounces (35mL/kg IBW)    Nutrition Diagnosis  Yes;    Overweight/obesity related to Excess energy intake as evidenced by BMI more than normative standard for age and sex (obesity-grade II 35-39.9)     Nutrition Intervention    Nutrition Prescription  Calories: 1,200-1,600 kcal  Protein: 70-87 g  Fluid: 68+ ounces    Meal Plan (Bryce/Pro)  Breakfast: 200-300/15-20  Snack: 100-150/5+  Lunch: 400/25-30  Snack: 100-150/5+  Dinner: 450-500/30-35  Snack: --    Nutrition Education  Healthy Core Manual   Calorie controlled menu  Lean protein food choices  Healthy snack options  Food journaling tips    Nutrition Counseling  Strategies: meal planning, portion sizes, healthy snack choices, hydration, fiber intake, protein intake, exercise, food logging    Monitoring and  Evaluation:    Evaluation criteria  Energy Intake  Meet protein needs  Maintain adequate hydration  Monitor weekly weight  Meal planning/preparation  Food journal   Decreased portions at mealtimes and snacks  Physical activity     Barriers to learning:none  Readiness to change: Action:  (Changing behavior)  Comprehension: very good  Expected Compliance: very good

## 2024-03-04 ENCOUNTER — CLINICAL SUPPORT (OUTPATIENT)
Dept: BARIATRICS | Facility: CLINIC | Age: 49
End: 2024-03-04

## 2024-03-04 VITALS — WEIGHT: 233.7 LBS | BODY MASS INDEX: 37.56 KG/M2 | HEIGHT: 66 IN

## 2024-03-04 DIAGNOSIS — R63.5 ABNORMAL WEIGHT GAIN: Primary | ICD-10-CM

## 2024-03-04 PROCEDURE — RECHECK

## 2024-03-12 ENCOUNTER — CLINICAL SUPPORT (OUTPATIENT)
Dept: BARIATRICS | Facility: CLINIC | Age: 49
End: 2024-03-12

## 2024-03-12 VITALS — HEIGHT: 66 IN | BODY MASS INDEX: 36.77 KG/M2 | WEIGHT: 228.8 LBS

## 2024-03-12 DIAGNOSIS — R63.5 ABNORMAL WEIGHT GAIN: Primary | ICD-10-CM

## 2024-03-12 PROCEDURE — RECHECK

## 2024-03-19 ENCOUNTER — CLINICAL SUPPORT (OUTPATIENT)
Dept: BARIATRICS | Facility: CLINIC | Age: 49
End: 2024-03-19

## 2024-03-19 VITALS — HEIGHT: 66 IN | BODY MASS INDEX: 36.59 KG/M2 | WEIGHT: 227.7 LBS

## 2024-03-19 DIAGNOSIS — R63.5 ABNORMAL WEIGHT GAIN: Primary | ICD-10-CM

## 2024-03-19 PROCEDURE — RECHECK

## 2024-03-26 ENCOUNTER — CLINICAL SUPPORT (OUTPATIENT)
Dept: BARIATRICS | Facility: CLINIC | Age: 49
End: 2024-03-26

## 2024-03-26 VITALS — WEIGHT: 225.5 LBS | BODY MASS INDEX: 36.24 KG/M2 | HEIGHT: 66 IN

## 2024-03-26 DIAGNOSIS — R63.5 ABNORMAL WEIGHT GAIN: Primary | ICD-10-CM

## 2024-03-26 PROCEDURE — RECHECK

## 2024-04-10 ENCOUNTER — RA CDI HCC (OUTPATIENT)
Dept: OTHER | Facility: HOSPITAL | Age: 49
End: 2024-04-10

## 2024-04-10 NOTE — PROGRESS NOTES
HCC coding opportunities          Chart Reviewed number of suggestions sent to Provider: 1     Patients Insurance        Commercial Insurance: [employer]       J45.40: Moderate persistent asthma without complication [[id number]]     Last assessed on 10/11/2023 - please review and assess and document per MEAT if applicable for 2024

## 2024-04-22 DIAGNOSIS — E66.01 CLASS 2 SEVERE OBESITY DUE TO EXCESS CALORIES WITH SERIOUS COMORBIDITY AND BODY MASS INDEX (BMI) OF 39.0 TO 39.9 IN ADULT (HCC): ICD-10-CM

## 2024-04-23 RX ORDER — TIRZEPATIDE 5 MG/.5ML
5 INJECTION, SOLUTION SUBCUTANEOUS WEEKLY
Qty: 2 ML | Refills: 0 | Status: SHIPPED | OUTPATIENT
Start: 2024-04-23 | End: 2024-04-29 | Stop reason: SDUPTHER

## 2024-04-29 DIAGNOSIS — E66.01 CLASS 2 SEVERE OBESITY DUE TO EXCESS CALORIES WITH SERIOUS COMORBIDITY AND BODY MASS INDEX (BMI) OF 39.0 TO 39.9 IN ADULT (HCC): ICD-10-CM

## 2024-04-29 RX ORDER — TIRZEPATIDE 5 MG/.5ML
5 INJECTION, SOLUTION SUBCUTANEOUS WEEKLY
Qty: 2 ML | Refills: 0 | Status: SHIPPED | OUTPATIENT
Start: 2024-04-29 | End: 2024-05-29

## 2024-05-01 ENCOUNTER — TELEPHONE (OUTPATIENT)
Age: 49
End: 2024-05-01

## 2024-05-01 DIAGNOSIS — R73.09 ELEVATED HEMOGLOBIN A1C: ICD-10-CM

## 2024-05-01 DIAGNOSIS — I10 PRIMARY HYPERTENSION: ICD-10-CM

## 2024-05-01 DIAGNOSIS — I10 PRIMARY HYPERTENSION: Primary | ICD-10-CM

## 2024-05-01 DIAGNOSIS — Z13.220 SCREENING FOR LIPID DISORDERS: ICD-10-CM

## 2024-05-01 NOTE — TELEPHONE ENCOUNTER
Alyce is scheduled for her physical on 6/27.  She is requesting labs to be ordered to get done piror to appointment.   Please advise, thank you!

## 2024-05-02 RX ORDER — LISINOPRIL 10 MG/1
10 TABLET ORAL DAILY
Qty: 90 TABLET | Refills: 1 | Status: SHIPPED | OUTPATIENT
Start: 2024-05-02

## 2024-05-18 DIAGNOSIS — J45.41 MODERATE PERSISTENT ASTHMA WITH ACUTE EXACERBATION: ICD-10-CM

## 2024-05-20 RX ORDER — FLUTICASONE FUROATE AND VILANTEROL TRIFENATATE 100; 25 UG/1; UG/1
POWDER RESPIRATORY (INHALATION)
Qty: 60 EACH | Refills: 5 | Status: SHIPPED | OUTPATIENT
Start: 2024-05-20

## 2024-06-04 ENCOUNTER — HOSPITAL ENCOUNTER (EMERGENCY)
Facility: HOSPITAL | Age: 49
Discharge: HOME | End: 2024-06-04
Attending: EMERGENCY MEDICINE
Payer: COMMERCIAL

## 2024-06-04 ENCOUNTER — APPOINTMENT (EMERGENCY)
Dept: CT IMAGING | Facility: HOSPITAL | Age: 49
End: 2024-06-04
Payer: COMMERCIAL

## 2024-06-04 VITALS
RESPIRATION RATE: 18 BRPM | DIASTOLIC BLOOD PRESSURE: 58 MMHG | OXYGEN SATURATION: 95 % | HEART RATE: 99 BPM | TEMPERATURE: 98.3 F | SYSTOLIC BLOOD PRESSURE: 131 MMHG

## 2024-06-04 DIAGNOSIS — E87.1 HYPONATREMIA: ICD-10-CM

## 2024-06-04 DIAGNOSIS — R21 RASH: ICD-10-CM

## 2024-06-04 DIAGNOSIS — R03.0 ELEVATED BLOOD PRESSURE READING: ICD-10-CM

## 2024-06-04 DIAGNOSIS — L02.91 ABSCESS: ICD-10-CM

## 2024-06-04 DIAGNOSIS — T78.2XXA ANAPHYLAXIS, INITIAL ENCOUNTER: Primary | ICD-10-CM

## 2024-06-04 DIAGNOSIS — T78.40XA ALLERGIC REACTION, INITIAL ENCOUNTER: ICD-10-CM

## 2024-06-04 LAB
ALBUMIN SERPL BCP-MCNC: 4 G/DL (ref 3.5–5)
ALP SERPL-CCNC: 99 U/L (ref 34–104)
ALT SERPL W P-5'-P-CCNC: 26 U/L (ref 7–52)
ANION GAP SERPL CALCULATED.3IONS-SCNC: 8 MMOL/L (ref 4–13)
AST SERPL W P-5'-P-CCNC: 21 U/L (ref 13–39)
BASOPHILS # BLD MANUAL: 0 THOUSAND/UL (ref 0–0.1)
BASOPHILS NFR MAR MANUAL: 0 % (ref 0–1)
BILIRUB SERPL-MCNC: 0.57 MG/DL (ref 0.2–1)
BUN SERPL-MCNC: 14 MG/DL (ref 5–25)
CALCIUM SERPL-MCNC: 8.8 MG/DL (ref 8.4–10.2)
CHLORIDE SERPL-SCNC: 102 MMOL/L (ref 96–108)
CO2 SERPL-SCNC: 22 MMOL/L (ref 21–32)
CREAT SERPL-MCNC: 1.2 MG/DL (ref 0.6–1.3)
EOSINOPHIL # BLD MANUAL: 0 THOUSAND/UL (ref 0–0.4)
EOSINOPHIL NFR BLD MANUAL: 0 % (ref 0–6)
ERYTHROCYTE [DISTWIDTH] IN BLOOD BY AUTOMATED COUNT: 13.4 % (ref 11.6–15.1)
GFR SERPL CREATININE-BSD FRML MDRD: 52 ML/MIN/1.73SQ M
GLUCOSE SERPL-MCNC: 112 MG/DL (ref 65–140)
HCT VFR BLD AUTO: 40 % (ref 34.8–46.1)
HGB BLD-MCNC: 13.3 G/DL (ref 11.5–15.4)
LYMPHOCYTES # BLD AUTO: 0.49 THOUSAND/UL (ref 0.6–4.47)
LYMPHOCYTES # BLD AUTO: 5 % (ref 14–44)
MCH RBC QN AUTO: 29.8 PG (ref 26.8–34.3)
MCHC RBC AUTO-ENTMCNC: 33.3 G/DL (ref 31.4–37.4)
MCV RBC AUTO: 90 FL (ref 82–98)
MONOCYTES # BLD AUTO: 0 THOUSAND/UL (ref 0–1.22)
MONOCYTES NFR BLD: 0 % (ref 4–12)
NEUTROPHILS # BLD MANUAL: 9.27 THOUSAND/UL (ref 1.85–7.62)
NEUTS BAND NFR BLD MANUAL: 2 % (ref 0–8)
NEUTS SEG NFR BLD AUTO: 93 % (ref 43–75)
PLATELET # BLD AUTO: 336 THOUSANDS/UL (ref 149–390)
PLATELET BLD QL SMEAR: ADEQUATE
PMV BLD AUTO: 9.2 FL (ref 8.9–12.7)
POTASSIUM SERPL-SCNC: 3.8 MMOL/L (ref 3.5–5.3)
PROT SERPL-MCNC: 7.2 G/DL (ref 6.4–8.4)
RBC # BLD AUTO: 4.46 MILLION/UL (ref 3.81–5.12)
RBC MORPH BLD: NORMAL
SODIUM SERPL-SCNC: 132 MMOL/L (ref 135–147)
WBC # BLD AUTO: 9.76 THOUSAND/UL (ref 4.31–10.16)

## 2024-06-04 PROCEDURE — 99285 EMERGENCY DEPT VISIT HI MDM: CPT | Performed by: EMERGENCY MEDICINE

## 2024-06-04 PROCEDURE — 36415 COLL VENOUS BLD VENIPUNCTURE: CPT

## 2024-06-04 PROCEDURE — 80053 COMPREHEN METABOLIC PANEL: CPT | Performed by: EMERGENCY MEDICINE

## 2024-06-04 PROCEDURE — 70498 CT ANGIOGRAPHY NECK: CPT

## 2024-06-04 PROCEDURE — 96375 TX/PRO/DX INJ NEW DRUG ADDON: CPT

## 2024-06-04 PROCEDURE — 85007 BL SMEAR W/DIFF WBC COUNT: CPT | Performed by: EMERGENCY MEDICINE

## 2024-06-04 PROCEDURE — 85027 COMPLETE CBC AUTOMATED: CPT | Performed by: EMERGENCY MEDICINE

## 2024-06-04 PROCEDURE — 96372 THER/PROPH/DIAG INJ SC/IM: CPT

## 2024-06-04 PROCEDURE — 96374 THER/PROPH/DIAG INJ IV PUSH: CPT

## 2024-06-04 PROCEDURE — 70496 CT ANGIOGRAPHY HEAD: CPT

## 2024-06-04 PROCEDURE — 99284 EMERGENCY DEPT VISIT MOD MDM: CPT

## 2024-06-04 RX ORDER — DOXYCYCLINE HYCLATE 100 MG/1
100 CAPSULE ORAL 2 TIMES DAILY
Qty: 14 CAPSULE | Refills: 0 | Status: SHIPPED | OUTPATIENT
Start: 2024-06-04 | End: 2024-06-11

## 2024-06-04 RX ORDER — TRIAMCINOLONE ACETONIDE 1 MG/G
CREAM TOPICAL 2 TIMES DAILY
Qty: 45 G | Refills: 0 | Status: SHIPPED | OUTPATIENT
Start: 2024-06-04 | End: 2024-06-18

## 2024-06-04 RX ORDER — PREDNISONE 20 MG/1
40 TABLET ORAL DAILY
Qty: 10 TABLET | Refills: 0 | Status: SHIPPED | OUTPATIENT
Start: 2024-06-04 | End: 2024-06-04

## 2024-06-04 RX ORDER — FAMOTIDINE 20 MG/1
20 TABLET, FILM COATED ORAL ONCE
Status: COMPLETED | OUTPATIENT
Start: 2024-06-04 | End: 2024-06-04

## 2024-06-04 RX ORDER — EPINEPHRINE 0.3 MG/.3ML
0.3 INJECTION SUBCUTANEOUS ONCE
Qty: 0.6 ML | Refills: 0 | Status: SHIPPED | OUTPATIENT
Start: 2024-06-04 | End: 2024-06-04

## 2024-06-04 RX ORDER — METOCLOPRAMIDE HYDROCHLORIDE 5 MG/ML
10 INJECTION INTRAMUSCULAR; INTRAVENOUS ONCE
Status: COMPLETED | OUTPATIENT
Start: 2024-06-04 | End: 2024-06-04

## 2024-06-04 RX ORDER — DIPHENHYDRAMINE HYDROCHLORIDE 50 MG/ML
25 INJECTION INTRAMUSCULAR; INTRAVENOUS ONCE
Status: COMPLETED | OUTPATIENT
Start: 2024-06-04 | End: 2024-06-04

## 2024-06-04 RX ORDER — PREDNISONE 20 MG/1
60 TABLET ORAL ONCE
Status: COMPLETED | OUTPATIENT
Start: 2024-06-04 | End: 2024-06-04

## 2024-06-04 RX ORDER — EPINEPHRINE 1 MG/ML
0.5 INJECTION, SOLUTION, CONCENTRATE INTRAVENOUS ONCE
Status: COMPLETED | OUTPATIENT
Start: 2024-06-04 | End: 2024-06-04

## 2024-06-04 RX ADMIN — DIPHENHYDRAMINE HYDROCHLORIDE 25 MG: 50 INJECTION, SOLUTION INTRAMUSCULAR; INTRAVENOUS at 12:54

## 2024-06-04 RX ADMIN — EPINEPHRINE 0.5 MG: 1 INJECTION, SOLUTION, CONCENTRATE INTRAVENOUS at 13:09

## 2024-06-04 RX ADMIN — PREDNISONE 60 MG: 20 TABLET ORAL at 13:11

## 2024-06-04 RX ADMIN — FAMOTIDINE 20 MG: 20 TABLET, FILM COATED ORAL at 12:54

## 2024-06-04 RX ADMIN — IOHEXOL 85 ML: 350 INJECTION, SOLUTION INTRAVENOUS at 12:44

## 2024-06-04 RX ADMIN — METOCLOPRAMIDE 10 MG: 5 INJECTION, SOLUTION INTRAMUSCULAR; INTRAVENOUS at 12:58

## 2024-06-04 NOTE — ED PROVIDER NOTES
History  Chief Complaint   Patient presents with    Medical Problem     Recently infection in big toe and put on bactrim. Today had lymph node swelling, generalized rash, feverish, neck pain, nausea      Alyce Oliva is a 48 y.o. female     They presented to the emergency department on June 4, 2024. Patient presents with:  Headache, feverish, neck stiffness, sweats, nausea and rash that started last night. The patient has a small abscess on her big right toe that started 3 weeks ago. The patient took bactrim for it but only completed half the course due to the disappearance of the abscess. A few days ago the abscess returned and she restarted taking the left over bactrim. The abscess drained pus and blood yesterday when the patient popped the abscess. The patient last night had a new onset maximal pain headache and accompanied neck stiff ness that has been gradually improving with tylenol that she took at 6:30 am. The patient broke out in raised, erythema lesions throughout her body and her lymph nodes in her neck had became tender early this morning. The patient denies any allergies or changes in medications. Patient has asthma that she takes Breo for. The patient denies current fever, chills, blurry vision, chest pain, SOB, palpitations, abdominal pain, vomiting, diarrhea, constipation, dysuria, polyuria, hematuria, or any other complaint at this time.                Prior to Admission Medications   Prescriptions Last Dose Informant Patient Reported? Taking?   Fluticasone Furoate-Vilanterol (Breo Ellipta) 100-25 mcg/actuation inhaler   No No   Sig: INHALE 1 PUFF DAILY RINSE MOUTH AFTER USE.   Levonorgest-Eth Estrad 91-Day 0.15-0.03 &0.01 MG TABS  Self Yes No   Sig: Take 1 tablet by mouth every morning     cetirizine (ZyrTEC) 10 mg tablet  Self Yes No   Sig: Take 10 mg by mouth daily   famotidine (PEPCID) 20 mg tablet  Self No No   Sig: Take 1 tablet (20 mg total) by mouth 2 (two) times a day   Patient not taking:  Reported on 2024   ketoconazole (NIZORAL) 2 % cream   Yes No   Sig: APPLY TO RASH AREAS AROUND EYES EVERY MORNING, MIXED WITH PIMECROLIMUS.   ketoconazole (NIZORAL) 2 % shampoo   Yes No   Patient not taking: Reported on 2024   lansoprazole (PREVACID) 30 mg capsule  Self Yes No   Sig: as needed     lisinopril (ZESTRIL) 10 mg tablet   No No   Sig: TAKE 1 TABLET BY MOUTH EVERY DAY   meloxicam (MOBIC) 15 mg tablet  Self Yes No   Sig: Take 15 mg by mouth daily As directed   montelukast (SINGULAIR) 10 mg tablet  Self Yes No   Sig: Take 10 mg by mouth daily   Patient not taking: Reported on 2024   pimecrolimus (ELIDEL) 1 % cream  Self Yes No   Sig: MIX WITH KETOCONAZOLE CREAM BEFORE APPLYING TO FACE TWICE DAILY      Facility-Administered Medications: None       Past Medical History:   Diagnosis Date    Arthritis     Asthma     Bronchitis 2023    Contact lens/glasses fitting     Family history of colon cancer     hep C-liver cancer- to colon cancer-father    GERD (gastroesophageal reflux disease)     Obesity (BMI 30.0-34.9)        Past Surgical History:   Procedure Laterality Date     SECTION      ,     DILATION AND CURETTAGE OF UTERUS      miscarriage    EGD      MD ARTHRP KNE CONDYLE&PLATU MEDIAL&LAT COMPARTMENTS Left 2023    Procedure: ARTHROPLASTY KNEE TOTAL W ROBOT - SAME DAY - PRESS FIT - LEFT;  Surgeon: Jermaine Del Toro DO;  Location: [facility];  Service: Orthopedics    ROTATOR CUFF REPAIR Right     SHOULDER SURGERY      TONSILLECTOMY      TUBAL LIGATION         Family History   Problem Relation Age of Onset    Heart disease Mother         pacemaker    Coronary artery disease Mother             Hypertension Mother         pulmonary    Hearing loss Mother     Colon cancer Father             Liver cancer Father     Hepatitis Father     Cancer Father          - colon cancer     I have reviewed and agree with the history as  documented.    E-Cigarette/Vaping    E-Cigarette Use Never User      E-Cigarette/Vaping Substances    Nicotine No     THC No     CBD No     Flavoring No     Other No     Unknown No      Social History     Tobacco Use    Smoking status: Never     Passive exposure: Past    Smokeless tobacco: Never   Vaping Use    Vaping status: Never Used   Substance Use Topics    Alcohol use: Yes     Alcohol/week: 2.0 standard drinks of alcohol     Types: 2 Standard drinks or equivalent per week     Comment: social    Drug use: Never        Review of Systems   Constitutional:  Negative for chills and fever.   HENT:  Negative for ear pain and sore throat.    Eyes:  Negative for pain and visual disturbance.   Respiratory:  Negative for cough and shortness of breath.    Cardiovascular:  Negative for chest pain and palpitations.   Gastrointestinal:  Negative for abdominal pain, constipation, diarrhea, nausea and vomiting.   Genitourinary:  Negative for dysuria and hematuria.   Musculoskeletal:  Negative for arthralgias and back pain.   Skin:  Positive for rash. Negative for color change.   Neurological:  Negative for seizures and syncope.   All other systems reviewed and are negative.      Physical Exam  ED Triage Vitals   Temperature Pulse Respirations Blood Pressure SpO2   06/04/24 1003 06/04/24 1003 06/04/24 1003 06/04/24 1003 06/04/24 1003   98.3 °F (36.8 °C) 98 20 114/85 97 %      Temp Source Heart Rate Source Patient Position - Orthostatic VS BP Location FiO2 (%)   06/04/24 1003 06/04/24 1003 06/04/24 1003 06/04/24 1003 --   Oral Monitor Sitting Right arm       Pain Score       06/04/24 1300       6             Orthostatic Vital Signs  Vitals:    06/04/24 1330 06/04/24 1400 06/04/24 1430 06/04/24 1500   BP: 125/60 139/69 130/63 131/58   Pulse: 79 89 87 99   Patient Position - Orthostatic VS: Sitting Sitting Lying Lying       Physical Exam  Vitals and nursing note reviewed.   Constitutional:       General: She is not in acute  distress.     Appearance: She is well-developed.   HENT:      Head: Normocephalic and atraumatic.   Eyes:      Conjunctiva/sclera: Conjunctivae normal.   Cardiovascular:      Rate and Rhythm: Normal rate and regular rhythm.      Pulses: Normal pulses.      Heart sounds: Normal heart sounds. No murmur heard.     No friction rub. No gallop.   Pulmonary:      Effort: Pulmonary effort is normal. No respiratory distress.      Breath sounds: Normal breath sounds. No stridor. No wheezing, rhonchi or rales.   Abdominal:      Palpations: Abdomen is soft.      Tenderness: There is no abdominal tenderness. There is no guarding.      Hernia: No hernia is present.   Musculoskeletal:         General: No swelling.      Cervical back: Neck supple. Tenderness present.        Feet:    Lymphadenopathy:      Cervical: Cervical adenopathy present.   Skin:     General: Skin is warm and dry.      Capillary Refill: Capillary refill takes less than 2 seconds.      Findings: Rash present.      Comments: Small multiple raised erythema lesions. Pictures below.   Neurological:      Mental Status: She is alert.   Psychiatric:         Mood and Affect: Mood normal.                   ED Medications  Medications   famotidine (PEPCID) tablet 20 mg (20 mg Oral Given 6/4/24 1254)   diphenhydrAMINE (BENADRYL) injection 25 mg (25 mg Intravenous Given 6/4/24 1254)   metoclopramide (REGLAN) injection 10 mg (10 mg Intravenous Given 6/4/24 1258)   iohexol (OMNIPAQUE) 350 MG/ML injection (SINGLE-DOSE) 85 mL (85 mL Intravenous Given 6/4/24 1244)   EPINEPHrine PF (ADRENALIN) 1 mg/mL injection 0.5 mg (0.5 mg Intramuscular Given 6/4/24 1309)   predniSONE tablet 60 mg (60 mg Oral Given 6/4/24 1311)       Diagnostic Studies  Results Reviewed       Procedure Component Value Units Date/Time    POCT pregnancy, urine [[id number]]     Lab Status: No result     RBC Morphology Reflex Test [[id number]] Collected: 06/04/24 1012    Lab Status: Final result Specimen: Blood from  Arm, Left Updated: 06/04/24 1102    CBC and differential [[id number]]  (Normal) Collected: 06/04/24 1012    Lab Status: Final result Specimen: Blood from Arm, Left Updated: 06/04/24 1051     WBC 9.76 Thousand/uL      RBC 4.46 Million/uL      Hemoglobin 13.3 g/dL      Hematocrit 40.0 %      MCV 90 fL      MCH 29.8 pg      MCHC 33.3 g/dL      RDW 13.4 %      MPV 9.2 fL      Platelets 336 Thousands/uL     Narrative:      This is an appended report.  These results have been appended to a previously verified report.    Manual Differential(PHLEBS Do Not Order) [[id number]]  (Abnormal) Collected: 06/04/24 1012    Lab Status: Final result Specimen: Blood from Arm, Left Updated: 06/04/24 1051     Segmented % 93 %      Bands % 2 %      Lymphocytes % 5 %      Monocytes % 0 %      Eosinophils % 0 %      Basophils % 0 %      Absolute Neutrophils 9.27 Thousand/uL      Absolute Lymphocytes 0.49 Thousand/uL      Absolute Monocytes 0.00 Thousand/uL      Absolute Eosinophils 0.00 Thousand/uL      Absolute Basophils 0.00 Thousand/uL      Total Counted --     RBC Morphology Normal     Platelet Estimate Adequate    Comprehensive metabolic panel [[id number]]  (Abnormal) Collected: 06/04/24 1012    Lab Status: Final result Specimen: Blood from Arm, Left Updated: 06/04/24 1041     Sodium 132 mmol/L      Potassium 3.8 mmol/L      Chloride 102 mmol/L      CO2 22 mmol/L      ANION GAP 8 mmol/L      BUN 14 mg/dL      Creatinine 1.20 mg/dL      Glucose 112 mg/dL      Calcium 8.8 mg/dL      AST 21 U/L      ALT 26 U/L      Alkaline Phosphatase 99 U/L      Total Protein 7.2 g/dL      Albumin 4.0 g/dL      Total Bilirubin 0.57 mg/dL      eGFR 52 ml/min/1.73sq m     Narrative:      National Kid[employer] Foundation guidelines for Chronic Kidney Disease (CKD):     Stage 1 with normal or high GFR (GFR > 90 mL/min/1.73 square meters)    Stage 2 Mild CKD (GFR = 60-89 mL/min/1.73 square meters)    Stage 3A Moderate CKD (GFR = 45-59 mL/min/1.73 square  meters)    Stage 3B Moderate CKD (GFR = 30-44 mL/min/1.73 square meters)    Stage 4 Severe CKD (GFR = 15-29 mL/min/1.73 square meters)    Stage 5 End Stage CKD (GFR <15 mL/min/1.73 square meters)  Note: GFR calculation is accurate only with a steady state creatinine                   CTA head and neck with and without contrast   Final Result by E. Alec Schoenberger, MD (06/04 1311)      No acute intracranial pathology.   No significant stenosis of the cervical carotid or vertebral arteries   No significant intracranial stenosis, large vessel occlusion or aneurysm.               Workstation performed: [id number]               Procedures  Procedures      ED Course  ED Course as of 06/04/24 1529   Tue Jun 04, 2024   1345 Nausea has completely resolved. Urticarial rash has stopped spreading and started to improve. No difficulty swallowing, tongue swelling or throat closing.   1512 On re-evaluation. Pt has complete resolution of headache and nausea. Rash still persists.   1512 Dermatology (nalini Santiago) consulted recommend Triamcinolone 0.1% pintment BID for two weeks on rash (not face or groin). Ambulatory referral to derm. Return to ED for any worsening rash or systemic symptoms                                       Medical Decision Making  Patient presents with:  Headache, feverish, neck stiffness, sweats, nausea and rash that started last night. The patient has a small abscess on her big right toe that started 3 weeks ago. The patient took bactrim for it but only completed half the course due to the disappearance of the abscess. A few days ago the abscess returned and she restarted taking the left over bactrim. The abscess drained pus and blood yesterday when the patient popped the abscess. The patient last night had a new onset maximal pain headache and accompanied neck stiff ness that has been gradually improving with tylenol that she took at 6:30 am. The patient broke out in raised, erythema lesions throughout  her body and her lymph nodes in her neck had became tender early this morning.     Patient seen and examined noted to have small abscess on left big toe, urticarial rash.      Differential diagnosis includes but is not limited to anaphylaxis, allergic reaction, subarachnoid hemorrhage.      Patient's labs notable for: unremarkable CBC, CMP, Imaging revealed: no acute intracranial or neck pathology     Dermatology (nalini Santiago) consulted recommend Triamcinolone 0.1% pintment BID for two weeks on rash (not face or groin). Ambulatory referral to derm. Return to ED for any worsening rash or systemic symptoms    Patient appears well, is nontoxic appearing, Alyce Oliva expresses understanding and agrees with plan of care at this time. In light of this patient would benefit from outpatient management.    Patient was rx'd as below       Amount and/or Complexity of Data Reviewed  Labs: ordered.  Radiology: ordered.    Risk  Prescription drug management.          Disposition  Final diagnoses:   Allergic reaction, initial encounter   Anaphylaxis, initial encounter   Rash   Elevated blood pressure reading   Hyponatremia   Abscess     Time reflects when diagnosis was documented in both MDM as applicable and the Disposition within this note       Time User Action Codes Description Comment    6/4/2024  1:05 PM Fernandez Gonzales Add [T78.40XA] Allergic reaction, initial encounter     6/4/2024  2:59 PM Gerardo Vasquez Add [T78.2XXA] Anaphylaxis, initial encounter     6/4/2024  2:59 PM Gearrdo Vasquez Modify [T78.40XA] Allergic reaction, initial encounter     6/4/2024  2:59 PM Gerardo Vasquez Modify [T78.2XXA] Anaphylaxis, initial encounter     6/4/2024  2:59 PM Gerardo Vasquez Add [R21] Rash     6/4/2024  2:59 PM Gerardo Vasquez Add [R03.0] Elevated blood pressure reading     6/4/2024  3:00 PM Fernandez Gonzales Add [E87.1] Hyponatremia     6/4/2024  3:22 PM Fernandez Gonzales [L02.91] Abscess           ED Disposition        ED Disposition   Discharge    Condition   Stable    Date/Time   Tue Jun 4, 2024  3:15 PM    Comment   Alyce Oliva discharge to home/self care.                   Follow-up Information       Follow up With Specialties Details Why Contact Info    LEONEL Randall Nurse Practitioner   [address]  [address]  [place] [address]  [phone]              Patient's Medications   Discharge Prescriptions    DOXYCYCLINE HYCLATE (VIBRAMYCIN) 100 MG CAPSULE    Take 1 capsule (100 mg total) by mouth 2 (two) times a day for 7 days       Start Date: 6/4/2024  End Date: 6/11/2024       Order Dose: 100 mg       Quantity: 14 capsule    Refills: 0    EPINEPHRINE (EPIPEN) 0.3 MG/0.3 ML SOAJ    Inject 0.3 mL (0.3 mg total) into a muscle once for 1 dose       Start Date: 6/4/2024  End Date: 6/4/2024       Order Dose: 0.3 mg       Quantity: 0.6 mL    Refills: 0    TRIAMCINOLONE (KENALOG) 0.1 % CREAM    Apply topically 2 (two) times a day for 14 days       Start Date: 6/4/2024  End Date: 6/18/2024       Order Dose: --       Quantity: 45 g    Refills: 0         PDMP Review         Value Time User    PDMP Reviewed  Yes 2/15/2023  4:35 PM Tomy Cadena PA-C             ED Provider  Attending physically available and evaluated Alyce Oliva. I managed the patient along with the ED Attending.    Electronically Signed by           Fernandez Gonzales MD  06/04/24 3792

## 2024-06-04 NOTE — ED ATTENDING ATTESTATION
6/4/2024  I, Gerardo Vasquez MD, saw and evaluated the patient. I have discussed the patient with the resident/non-physician practitioner and agree with the resident's/non-physician practitioner's findings, Plan of Care, and MDM as documented in the resident's/non-physician practitioner's note, except where noted. All available labs and Radiology studies were reviewed.  I was present for key portions of any procedure(s) performed by the resident/non-physician practitioner and I was immediately available to provide assistance.       At this point I agree with the current assessment done in the Emergency Department.  I have conducted an independent evaluation of this patient a history and physical is as follows:    History    Patient is a 48-year-old female, with a history significant for asthma and obesity per my review of the medical record, who presents to the ED today for evaluation of rash.  Patient states that, 3 weeks ago, she had a pedicure and her right great toe was clogged.  This subsequently became infected and formed an abscess for which patient was prescribed Bactrim.  Patient took 5 days of this and discontinued it early after she had improvement of symptoms.  However, over the last couple of days, patient states that the abscess recurred on her toe and she began taking the remainder of her Bactrim prescription.  Starting last evening, patient experienced sudden onset, maximal in onset, worst headache at 8 PM and this has been constant and overall improving since its onset.  There is associated neck pain/stiffness.  Patient has taken Tylenol x 2 pills to remit her symptoms.  Since then, she reports the development of a pruritic rash over her extremities and torso.  She also describes chills, an episode of diaphoresis, and subjective adenopathy on her neck.  There is no associated vomiting, abdominal pain, vision change, dyspnea, chest pain, diarrhea, new medications/soaps/exposures other than  the Bactrim.  Patient takes no antithrombotic medication.  Patient states that the abscess that self drained spontaneously and is now better.    Patient is without other concerns at this time.     ROS  Patient denies: Fever; dysphagia; vision change; chest pain; dyspnea; abdominal pain; polyuria; dysuria; weakness; numbness; difficulty walking; confusion     Physical Exam    GENERAL APPEARANCE: NAD  NEURO: Cranial nerves 2-12 intact.  5/5 strength in all four extremities including finger extension against resistance.  Sensation to light touch subjectively intact/equal in all four extremities and the face.  Negative Kernig and Brezinski signs  Patient is speaking clearly in complete sentences.  Patient is answering appropriately and able to follow commands.   HEENT: PERRL, Moist mucous membranes, external ears normal, nose normal  Neck: No cervical adenopathy. Full neck range of motion.  No nuchal rigidity  CV: RRR. No murmurs, rubs, gallops  LUNGS: Clear to auscultation: No wheezes, stridor, rhonchi, rales  GI: Abdomen non-distended. Soft. Non-tender and without rebound or guarding   : Deferred at this time  MSK: No deformity.   Skin: Warm and dry.  Urticarial appearing blanchable and Nikolsky negative rash on the extremities and torso.  No purpura or petechiae.  No mucous membrane involvement  Capillary refill: <2 seconds    Patient is currently afebrile and hemodynamically stable    Assessment/Plan/MDM  Rash, chills, headache, abscess  -Concern for subarachnoid hemorrhage.  Overall low suspicion for meningitis at this time based on history physical exam.  Also considered primary headache, electrolyte abnormality, allergic reaction, drug reaction.  No reason to suspect SJS/TENS at this time based on history physical exam.  -I discussed CT followed by CTA versus lumbar puncture with patient (discussing LP would evaluate for both SAH and meningitis (this was explained to patient what this is)) and patient expressed  preference for CTA and wish to avoid lumbar puncture at this time  -Will investigate with CTA head and neck, CBC, CMP, pregnancy test  -Will manage with antihistamines, Reglan, further based on workup    ED Course  ED Course as of 06/04/24 1520   Tue Jun 04, 2024   1150 Bands %: 2  WNL   1150 Sodium(!): 132  Low, new   1150 Absolute Eosinophils: 0.00  WNL   1305 On reevaluation, patient reports development of epigastric abdominal discomfort as well as worsening nausea.  Urticarial rash continues to spread.  Will treat for anaphylaxis.  No eye pain.  No oral lesions   1314 Given no blood noted on CT imaging as well as no pain, no reason to suspect subarachnoid at this time   1319 On reevaluation, patient reports improvement in symptoms.  Results to this point reviewed with patient.  She does not want lumbar puncture at this time.  As patient is afebrile, has no purpura, no nuchal rigidity, and overall low suspicion for meningitis, this felt to be appropriate.  Strict return precautions provided and patient expressed understanding for need for close follow-up   1445 On reevaluation, patient reports resolution of headache; ever, rash persists.  Given onset of rash after starting Bactrim and it not clearing up after treatment for anaphylaxis, will discuss with dermatology         Critical Care Time  Procedures

## 2024-06-04 NOTE — Clinical Note
Alyce Oliva was seen and treated in our emergency department on 6/4/2024.                Diagnosis:     Alyce  may return to work on return date.    She may return on this date: 06/05/2024         If you have any questions or concerns, please don't hesitate to call.      Fernandez Gonzales MD    ______________________________           _______________          _______________  Hospital Representative                              Date                                Time

## 2024-07-09 LAB
ALBUMIN SERPL-MCNC: 4.1 G/DL (ref 3.5–5.7)
ALP SERPL-CCNC: 95 U/L (ref 35–120)
ALT SERPL-CCNC: 15 U/L
ANION GAP SERPL CALCULATED.3IONS-SCNC: 9 MMOL/L (ref 3–11)
AST SERPL-CCNC: 13 U/L
BILIRUB SERPL-MCNC: 0.5 MG/DL (ref 0.2–1)
BUN SERPL-MCNC: 17 MG/DL (ref 7–25)
CALCIUM SERPL-MCNC: 9.2 MG/DL (ref 8.5–10.1)
CHLORIDE SERPL-SCNC: 106 MMOL/L (ref 100–109)
CHOLEST SERPL-MCNC: 226 MG/DL
CHOLEST/HDLC SERPL: 5 {RATIO}
CO2 SERPL-SCNC: 24 MMOL/L (ref 21–31)
CREAT SERPL-MCNC: 0.94 MG/DL (ref 0.4–1.1)
CYTOLOGY CMNT CVX/VAG CYTO-IMP: NORMAL
EST. AVERAGE GLUCOSE BLD GHB EST-MCNC: 100 MG/DL
GFR/BSA.PRED SERPLBLD CYS-BASED-ARV: 73 ML/MIN/{1.73_M2}
GLUCOSE SERPL-MCNC: 79 MG/DL (ref 65–99)
HBA1C MFR BLD: 5.1 %
HDLC SERPL-MCNC: 45 MG/DL (ref 23–92)
LDLC SERPL CALC-MCNC: 140 MG/DL
NONHDLC SERPL-MCNC: 181 MG/DL
POTASSIUM SERPL-SCNC: 4.3 MMOL/L (ref 3.5–5.2)
PROT SERPL-MCNC: 7 G/DL (ref 6.3–8.3)
SODIUM SERPL-SCNC: 139 MMOL/L (ref 135–145)
TRIGL SERPL-MCNC: 206 MG/DL

## 2024-07-25 ENCOUNTER — TELEPHONE (OUTPATIENT)
Dept: BARIATRICS | Facility: CLINIC | Age: 49
End: 2024-07-25

## 2024-07-25 DIAGNOSIS — E66.01 CLASS 2 SEVERE OBESITY DUE TO EXCESS CALORIES WITH SERIOUS COMORBIDITY AND BODY MASS INDEX (BMI) OF 39.0 TO 39.9 IN ADULT (HCC): Primary | ICD-10-CM

## 2024-07-25 RX ORDER — TIRZEPATIDE 5 MG/.5ML
5 INJECTION, SOLUTION SUBCUTANEOUS WEEKLY
Qty: 2 ML | Refills: 1 | Status: SHIPPED | OUTPATIENT
Start: 2024-07-25 | End: 2024-09-19

## 2024-08-14 ENCOUNTER — RA CDI HCC (OUTPATIENT)
Dept: OTHER | Facility: HOSPITAL | Age: 49
End: 2024-08-14

## 2024-08-14 NOTE — PROGRESS NOTES
HCC coding opportunities          Chart Reviewed number of suggestions sent to Provider: 1     Patients Insurance        Commercial Insurance: [employer]       J45.40: Moderate persistent asthma without complication [[id number]]      Last assessed on 10/11/2023 - please review and assess and document per MEAT if applicable for 2024

## 2024-08-25 DIAGNOSIS — E66.01 CLASS 2 SEVERE OBESITY DUE TO EXCESS CALORIES WITH SERIOUS COMORBIDITY AND BODY MASS INDEX (BMI) OF 39.0 TO 39.9 IN ADULT (HCC): ICD-10-CM

## 2024-08-26 RX ORDER — TIRZEPATIDE 5 MG/.5ML
INJECTION, SOLUTION SUBCUTANEOUS
Qty: 2 ML | Refills: 0 | Status: SHIPPED | OUTPATIENT
Start: 2024-08-26

## 2024-09-23 DIAGNOSIS — E66.812 CLASS 2 SEVERE OBESITY DUE TO EXCESS CALORIES WITH SERIOUS COMORBIDITY AND BODY MASS INDEX (BMI) OF 39.0 TO 39.9 IN ADULT (HCC): ICD-10-CM

## 2024-09-23 DIAGNOSIS — E66.01 CLASS 2 SEVERE OBESITY DUE TO EXCESS CALORIES WITH SERIOUS COMORBIDITY AND BODY MASS INDEX (BMI) OF 39.0 TO 39.9 IN ADULT (HCC): ICD-10-CM

## 2024-09-24 RX ORDER — TIRZEPATIDE 5 MG/.5ML
INJECTION, SOLUTION SUBCUTANEOUS
Qty: 2 ML | Refills: 0 | Status: SHIPPED | OUTPATIENT
Start: 2024-09-24

## 2024-10-19 DIAGNOSIS — E66.812 CLASS 2 SEVERE OBESITY DUE TO EXCESS CALORIES WITH SERIOUS COMORBIDITY AND BODY MASS INDEX (BMI) OF 39.0 TO 39.9 IN ADULT (HCC): ICD-10-CM

## 2024-10-19 DIAGNOSIS — E66.01 CLASS 2 SEVERE OBESITY DUE TO EXCESS CALORIES WITH SERIOUS COMORBIDITY AND BODY MASS INDEX (BMI) OF 39.0 TO 39.9 IN ADULT (HCC): ICD-10-CM

## 2024-10-21 RX ORDER — TIRZEPATIDE 5 MG/.5ML
INJECTION, SOLUTION SUBCUTANEOUS
Qty: 2 ML | Refills: 0 | Status: SHIPPED | OUTPATIENT
Start: 2024-10-21

## 2024-10-27 DIAGNOSIS — I10 PRIMARY HYPERTENSION: ICD-10-CM

## 2024-10-29 RX ORDER — LISINOPRIL 10 MG/1
10 TABLET ORAL DAILY
Qty: 90 TABLET | Refills: 0 | Status: SHIPPED | OUTPATIENT
Start: 2024-10-29

## 2024-10-31 ENCOUNTER — OFFICE VISIT (OUTPATIENT)
Dept: BARIATRICS | Facility: CLINIC | Age: 49
End: 2024-10-31
Payer: COMMERCIAL

## 2024-10-31 VITALS
HEIGHT: 66 IN | BODY MASS INDEX: 33.56 KG/M2 | OXYGEN SATURATION: 97 % | WEIGHT: 208.8 LBS | SYSTOLIC BLOOD PRESSURE: 124 MMHG | HEART RATE: 86 BPM | DIASTOLIC BLOOD PRESSURE: 80 MMHG

## 2024-10-31 DIAGNOSIS — E66.01 CLASS 2 SEVERE OBESITY DUE TO EXCESS CALORIES WITH SERIOUS COMORBIDITY AND BODY MASS INDEX (BMI) OF 39.0 TO 39.9 IN ADULT (HCC): ICD-10-CM

## 2024-10-31 DIAGNOSIS — E66.811 CLASS 1 OBESITY DUE TO EXCESS CALORIES WITH SERIOUS COMORBIDITY AND BODY MASS INDEX (BMI) OF 33.0 TO 33.9 IN ADULT: ICD-10-CM

## 2024-10-31 DIAGNOSIS — E66.09 CLASS 1 OBESITY DUE TO EXCESS CALORIES WITH SERIOUS COMORBIDITY AND BODY MASS INDEX (BMI) OF 33.0 TO 33.9 IN ADULT: ICD-10-CM

## 2024-10-31 DIAGNOSIS — E66.812 CLASS 2 SEVERE OBESITY DUE TO EXCESS CALORIES WITH SERIOUS COMORBIDITY AND BODY MASS INDEX (BMI) OF 39.0 TO 39.9 IN ADULT (HCC): ICD-10-CM

## 2024-10-31 PROCEDURE — 99214 OFFICE O/P EST MOD 30 MIN: CPT | Performed by: NURSE PRACTITIONER

## 2024-10-31 RX ORDER — TIRZEPATIDE 7.5 MG/.5ML
7.5 INJECTION, SOLUTION SUBCUTANEOUS WEEKLY
Qty: 2 ML | Refills: 1 | Status: SHIPPED | OUTPATIENT
Start: 2024-10-31 | End: 2024-12-26

## 2024-10-31 NOTE — ASSESSMENT & PLAN NOTE
- Patient is pursuing Conservative Program and follow up visits with medical weight management provider  - Initial weight loss goal of 5-10% weight loss for improved health. Weight loss can improve patient's co-morbid conditions and/or prevent weight-related complications.  - Explained the importance of continuing lifestyle changes in addition to any anti-obesity medications.   - Labs reviewed from 6/2024 and 7/2024    General Recommendations:  Nutrition:  Eat breakfast daily.  Do not skip meals.      Food log (ie.) www.myfitnesspal.com, sparkpeople.com, loseit.com, calorieking.com, etc.     Practice mindful eating.  Be sure to set aside time to eat, eat slowly, and savor your food.     Hydration:    At least 64oz of water daily.  No sugar sweetened beverages.  No juice (eat the fruit instead).     Exercise:  Studies have shown that the ideal exercise goal is somewhere between 150 to 300 minutes of moderate intensity exercise a week.  Start with exercising 10 minutes every other day and gradually increase physical activity with a goal of at least 150 minutes of moderate intensity exercise a week, divided over at least 3 days a week.  An example of this would be exercising 30 minutes a day, 5 days a week.  Resistance training can increase muscle mass and increase our resting metabolic rate.   FULL BODY resistance training is recommended 2-3 times a week.  Do not do this on consecutive days to allow for muscle recovery.     Aim for a bare minimum 5000 steps, even on days you do not exercise.     Monitoring:   Weigh yourself daily.  If this causes undue stress, then just weigh yourself once a week.  Weigh yourself the same time of the day with the same amount of clothing on.  Preferably this should be done after waking up, before you eat, and with no clothing or minimal clothing on.     Specific Goals:  Calorie goal:  9739-4705 robert/day  Patient lifestyle habits were reviewed and patient was congratulated on her weight  loss since last visit.  Nutrition was discussed and patient was advised to make sure she is consuming enough calories throughout the day to avoid stalling her weight loss.  She was advised to make sure she is taking in at least 1100 to 1300 robert/day.  She was encouraged to utilize protein rich foods and snacks to make sure she is maintaining muscle mass.  Hydration was discussed and patient will continue to drink at least 64 ounces of water daily  Activity was discussed and patient was encouraged to incorporate strength training into her activity  Medications were discussed and patient will continue on Zepbound and the dose will be increased to 7.5 mg for hopefully more weight loss potential.  Patient was made aware that increasing the dose may make it more difficult for her to consume adequate calories and if that is the case we will need to go back down to a lower dose so she can make sure she maintains in a healthy calorie range.  Patient will call if she is unable to take in the recommended calories.  Patient will follow-up in the office every 2 to 3 months for accountability and medical management.

## 2024-10-31 NOTE — PROGRESS NOTES
Assessment/Plan:     Class 1 obesity due to excess calories with serious comorbidity and body mass index (BMI) of 33.0 to 33.9 in adult  - Patient is pursuing Conservative Program and follow up visits with medical weight management provider  - Initial weight loss goal of 5-10% weight loss for improved health. Weight loss can improve patient's co-morbid conditions and/or prevent weight-related complications.  - Explained the importance of continuing lifestyle changes in addition to any anti-obesity medications.   - Labs reviewed from 6/2024 and 7/2024    General Recommendations:  Nutrition:  Eat breakfast daily.  Do not skip meals.      Food log (ie.) www.iPowow.com, sparkpeople.com, loseit.com, Triblio.com, etc.     Practice mindful eating.  Be sure to set aside time to eat, eat slowly, and savor your food.     Hydration:    At least 64oz of water daily.  No sugar sweetened beverages.  No juice (eat the fruit instead).     Exercise:  Studies have shown that the ideal exercise goal is somewhere between 150 to 300 minutes of moderate intensity exercise a week.  Start with exercising 10 minutes every other day and gradually increase physical activity with a goal of at least 150 minutes of moderate intensity exercise a week, divided over at least 3 days a week.  An example of this would be exercising 30 minutes a day, 5 days a week.  Resistance training can increase muscle mass and increase our resting metabolic rate.   FULL BODY resistance training is recommended 2-3 times a week.  Do not do this on consecutive days to allow for muscle recovery.     Aim for a bare minimum 5000 steps, even on days you do not exercise.     Monitoring:   Weigh yourself daily.  If this causes undue stress, then just weigh yourself once a week.  Weigh yourself the same time of the day with the same amount of clothing on.  Preferably this should be done after waking up, before you eat, and with no clothing or minimal clothing on.      Specific Goals:  Calorie goal:  7699-5538 robert/day  Patient lifestyle habits were reviewed and patient was congratulated on her weight loss since last visit.  Nutrition was discussed and patient was advised to make sure she is consuming enough calories throughout the day to avoid stalling her weight loss.  She was advised to make sure she is taking in at least 1100 to 1300 robert/day.  She was encouraged to utilize protein rich foods and snacks to make sure she is maintaining muscle mass.  Hydration was discussed and patient will continue to drink at least 64 ounces of water daily  Activity was discussed and patient was encouraged to incorporate strength training into her activity  Medications were discussed and patient will continue on Zepbound and the dose will be increased to 7.5 mg for hopefully more weight loss potential.  Patient was made aware that increasing the dose may make it more difficult for her to consume adequate calories and if that is the case we will need to go back down to a lower dose so she can make sure she maintains in a healthy calorie range.  Patient will call if she is unable to take in the recommended calories.  Patient will follow-up in the office every 2 to 3 months for accountability and medical management.         Alyce was seen today for follow-up.    Diagnoses and all orders for this visit:    Class 1 obesity due to excess calories with serious comorbidity and body mass index (BMI) of 33.0 to 33.9 in adult  -     tirzepatide (Zepbound) 7.5 mg/0.5 mL auto-injector; Inject 0.5 mL (7.5 mg total) under the skin once a week    Class 2 severe obesity due to excess calories with serious comorbidity and body mass index (BMI) of 39.0 to 39.9 in adult ([facility])        Total time spent reviewing chart, interviewing patient, examining patient, discussing plan, answering all questions, and documentin minutes with >50% face-to-face time with the patient.    Follow up in approximately 3 months with  Non-Surgical Physician/Advanced Practitioner.    Subjective:   Chief Complaint   Patient presents with    Follow-up     Pt is here for MWM f/u       Patient ID: Alyce Oliva  is a 49 y.o. female with excess weight/obesity here to pursue weight management.  Patient is pursuing Conservative Program.   Most recent notes and records were reviewed.    HPI    Wt Readings from Last 20 Encounters:   10/31/24 94.7 kg (208 lb 12.8 oz)   03/26/24 102 kg (225 lb 8 oz)   03/19/24 103 kg (227 lb 11.2 oz)   03/12/24 104 kg (228 lb 12.8 oz)   03/04/24 106 kg (233 lb 11.2 oz)   02/27/24 105 kg (231 lb 14.4 oz)   02/20/24 105 kg (230 lb 8 oz)   02/06/24 107 kg (235 lb 11.2 oz)   02/01/24 107 kg (235 lb 12.8 oz)   01/30/24 106 kg (233 lb 9.6 oz)   01/16/24 106 kg (233 lb 1.6 oz)   01/05/24 108 kg (237 lb)   12/21/23 107 kg (235 lb 1.6 oz)   12/19/23 106 kg (233 lb 14.4 oz)   12/14/23 109 kg (240 lb)   12/12/23 107 kg (236 lb 9.6 oz)   12/07/23 108 kg (237 lb 8 oz)   11/14/23 108 kg (238 lb 9.6 oz)   10/19/23 105 kg (232 lb)   10/11/23 104 kg (230 lb)       Patient presents today to medical weight management office for follow up.  Patient is currently on Zepbound 5 mg and is tolerating without any side effects.  She has been on the 5 mg dose for the past 4 months and has not seen any additional weight loss for the past 2 to 3 months.  She feels that she is hitting a weight loss plateau and would like to discuss a higher dose.  She continues to try to be mindful of her nutrition but does find with the medication she is not as hungry.  She states when she does track her calories she is eating between 800-1000 robert/day.  She is not involved in an exercise routine at this time but did recently move and is unpacking and will be setting up a home gym.      Weight loss medication and dose: Zepbound 5mg  Started weight and date:  238.6 lbs  Current weight: 208.8 lbs (235.8 lbs at last OV)  Difference: -29.8 lbs (-27 lbs since last OV)  Goal weight:  "170-180 lbs ultimately     Starting BMI: 39.10 in 2023  Current BMI: 33.70     Waist Measurements:  2023: 42.5 in  10/2024: 40 in     Nutrition Prescription  Calories: 1,200-1,600 kcal  Protein: 70-87 g  Fluid: 68+ ounces     Diet recall:  B: english muffin and PB  S: cubes and cheese and grapes OR skips  L: salad with eggs or protein   S: no  D: protein with vegetable and starch  S: no     Hydration: water - 48-64oz, unsweetened iced tea, hot tea  Alcohol: no - rarely  Smoking: no  Exercise: not often - yardwork and moving  Occupation: teacher  Sleep: well but only 4-5 hours       Colonoscopy:   Mammogram: 2023      The following portions of the patient's history were reviewed and updated as appropriate: allergies, current medications, past family history, past medical history, past social history, past surgical history, and problem list.    Family History   Problem Relation Age of Onset    Heart disease Mother         pacemaker    Coronary artery disease Mother             Hypertension Mother         pulmonary    Hearing loss Mother     Colon cancer Father             Liver cancer Father     Hepatitis Father     Cancer Father          - colon cancer        Review of Systems   Constitutional:  Negative for fatigue.   HENT:  Negative for sore throat.    Respiratory:  Negative for cough and shortness of breath.    Cardiovascular:  Negative for chest pain, palpitations and leg swelling.   Gastrointestinal:  Negative for abdominal pain, constipation, diarrhea and nausea.   Genitourinary:  Negative for dysuria.   Musculoskeletal:  Negative for arthralgias and back pain.   Skin:  Negative for rash.   Neurological:  Negative for headaches.   Psychiatric/Behavioral:  Negative for dysphoric mood. The patient is not nervous/anxious.        Objective:  /80 (BP Location: Left arm, Patient Position: Sitting, Cuff Size: Large)   Pulse 86   Ht 5' 6\" (1.676 m)   Wt 94.7 kg (208 lb 12.8 " oz)   SpO2 97%   BMI 33.70 kg/m²     Physical Exam  Vitals and nursing note reviewed.   Constitutional:       Appearance: Normal appearance. She is obese.   HENT:      Head: Normocephalic.   Pulmonary:      Effort: Pulmonary effort is normal.   Neurological:      General: No focal deficit present.      Mental Status: She is alert and oriented to person, place, and time.   Psychiatric:         Mood and Affect: Mood normal.         Behavior: Behavior normal.         Thought Content: Thought content normal.         Judgment: Judgment normal.            Labs   Most recent labs reviewed   Lab Results   Component Value Date    SODIUM 139 07/09/2024    K 4.3 07/09/2024     07/09/2024    CO2 24 07/09/2024    AGAP 9 07/09/2024    BUN 17 07/09/2024    CREATININE 0.94 07/09/2024    GLUC 79 07/09/2024    CALCIUM 9.2 07/09/2024    AST 13 07/09/2024    ALT 15 07/09/2024    ALKPHOS 95 07/09/2024    TP 7.0 07/09/2024    TBILI 0.5 07/09/2024    EGFR 73 07/09/2024     Lab Results   Component Value Date    HGBA1C 5.1 07/09/2024     Lab Results   Component Value Date    TSH 1.03 04/06/2023     Lab Results   Component Value Date    CHOLESTEROL 226 (H) 07/09/2024     Lab Results   Component Value Date    HDL 45 07/09/2024     Lab Results   Component Value Date    TRIG 206 (H) 07/09/2024     Lab Results   Component Value Date    LDLCALC 140 (H) 07/09/2024

## 2024-11-22 ENCOUNTER — TELEPHONE (OUTPATIENT)
Dept: FAMILY MEDICINE CLINIC | Facility: CLINIC | Age: 49
End: 2024-11-22

## 2024-11-25 DIAGNOSIS — E66.811 CLASS 1 OBESITY DUE TO EXCESS CALORIES WITH SERIOUS COMORBIDITY AND BODY MASS INDEX (BMI) OF 33.0 TO 33.9 IN ADULT: Primary | ICD-10-CM

## 2024-11-25 DIAGNOSIS — E66.09 CLASS 1 OBESITY DUE TO EXCESS CALORIES WITH SERIOUS COMORBIDITY AND BODY MASS INDEX (BMI) OF 33.0 TO 33.9 IN ADULT: Primary | ICD-10-CM

## 2024-11-25 RX ORDER — TIRZEPATIDE 10 MG/.5ML
10 INJECTION, SOLUTION SUBCUTANEOUS WEEKLY
Qty: 4 ML | Refills: 0 | Status: SHIPPED | OUTPATIENT
Start: 2024-11-25 | End: 2025-01-24

## 2024-11-26 DIAGNOSIS — J45.41 MODERATE PERSISTENT ASTHMA WITH ACUTE EXACERBATION: ICD-10-CM

## 2024-11-27 RX ORDER — FLUTICASONE FUROATE AND VILANTEROL TRIFENATATE 100; 25 UG/1; UG/1
POWDER RESPIRATORY (INHALATION)
Qty: 60 EACH | Refills: 0 | Status: SHIPPED | OUTPATIENT
Start: 2024-11-27

## 2024-11-29 ENCOUNTER — TELEPHONE (OUTPATIENT)
Dept: PULMONOLOGY | Facility: CLINIC | Age: 49
End: 2024-11-29

## 2024-11-29 NOTE — TELEPHONE ENCOUNTER
called patient to get her scheuled for follow up. her last visit was oct. 2023. i scheduled her for 12/9 @ 8:20am with dr ridley at [facility]. told her to call if this appt time didnt work for her, we could find another date and time.

## 2025-01-08 ENCOUNTER — TELEPHONE (OUTPATIENT)
Age: 50
End: 2025-01-08

## 2025-01-08 DIAGNOSIS — J45.41 MODERATE PERSISTENT ASTHMA WITH ACUTE EXACERBATION: ICD-10-CM

## 2025-01-08 RX ORDER — FLUTICASONE FUROATE AND VILANTEROL 100; 25 UG/1; UG/1
POWDER RESPIRATORY (INHALATION)
Qty: 60 EACH | Refills: 0 | OUTPATIENT
Start: 2025-01-08

## 2025-01-09 DIAGNOSIS — J45.41 MODERATE PERSISTENT ASTHMA WITH ACUTE EXACERBATION: ICD-10-CM

## 2025-01-10 RX ORDER — FLUTICASONE FUROATE AND VILANTEROL 100; 25 UG/1; UG/1
1 POWDER RESPIRATORY (INHALATION) DAILY
Qty: 60 EACH | Refills: 2 | Status: SHIPPED | OUTPATIENT
Start: 2025-01-10 | End: 2025-01-14 | Stop reason: SDUPTHER

## 2025-01-11 DIAGNOSIS — E66.811 CLASS 1 OBESITY DUE TO EXCESS CALORIES WITH SERIOUS COMORBIDITY AND BODY MASS INDEX (BMI) OF 33.0 TO 33.9 IN ADULT: ICD-10-CM

## 2025-01-11 DIAGNOSIS — E66.09 CLASS 1 OBESITY DUE TO EXCESS CALORIES WITH SERIOUS COMORBIDITY AND BODY MASS INDEX (BMI) OF 33.0 TO 33.9 IN ADULT: ICD-10-CM

## 2025-01-13 RX ORDER — TIRZEPATIDE 10 MG/.5ML
INJECTION, SOLUTION SUBCUTANEOUS
Qty: 4 ML | Refills: 0 | Status: SHIPPED | OUTPATIENT
Start: 2025-01-13

## 2025-01-14 DIAGNOSIS — J45.41 MODERATE PERSISTENT ASTHMA WITH ACUTE EXACERBATION: ICD-10-CM

## 2025-01-14 RX ORDER — FLUTICASONE FUROATE AND VILANTEROL 100; 25 UG/1; UG/1
1 POWDER RESPIRATORY (INHALATION) DAILY
Qty: 60 EACH | Refills: 8 | Status: SHIPPED | OUTPATIENT
Start: 2025-01-14

## 2025-01-24 DIAGNOSIS — I10 PRIMARY HYPERTENSION: ICD-10-CM

## 2025-01-24 RX ORDER — LISINOPRIL 10 MG/1
10 TABLET ORAL DAILY
Qty: 30 TABLET | Refills: 0 | Status: SHIPPED | OUTPATIENT
Start: 2025-01-24

## 2025-02-05 ENCOUNTER — OFFICE VISIT (OUTPATIENT)
Dept: BARIATRICS | Facility: CLINIC | Age: 50
End: 2025-02-05
Payer: COMMERCIAL

## 2025-02-05 VITALS
SYSTOLIC BLOOD PRESSURE: 110 MMHG | HEIGHT: 66 IN | DIASTOLIC BLOOD PRESSURE: 70 MMHG | BODY MASS INDEX: 31.72 KG/M2 | OXYGEN SATURATION: 98 % | WEIGHT: 197.4 LBS | HEART RATE: 76 BPM

## 2025-02-05 DIAGNOSIS — E55.9 VITAMIN D DEFICIENCY: ICD-10-CM

## 2025-02-05 DIAGNOSIS — R53.83 FATIGUE: ICD-10-CM

## 2025-02-05 DIAGNOSIS — E66.811 CLASS 1 OBESITY DUE TO EXCESS CALORIES WITH SERIOUS COMORBIDITY AND BODY MASS INDEX (BMI) OF 31.0 TO 31.9 IN ADULT: Primary | ICD-10-CM

## 2025-02-05 DIAGNOSIS — E66.09 CLASS 1 OBESITY DUE TO EXCESS CALORIES WITH SERIOUS COMORBIDITY AND BODY MASS INDEX (BMI) OF 31.0 TO 31.9 IN ADULT: Primary | ICD-10-CM

## 2025-02-05 PROCEDURE — 99214 OFFICE O/P EST MOD 30 MIN: CPT | Performed by: NURSE PRACTITIONER

## 2025-02-05 RX ORDER — TIRZEPATIDE 10 MG/.5ML
10 INJECTION, SOLUTION SUBCUTANEOUS WEEKLY
Qty: 6 ML | Refills: 0 | Status: SHIPPED | OUTPATIENT
Start: 2025-02-05 | End: 2025-05-06

## 2025-02-05 NOTE — ASSESSMENT & PLAN NOTE
- Patient is pursuing Conservative Program and follow up visits with medical weight management provider  - Initial weight loss goal of 5-10% weight loss for improved health. Weight loss can improve patient's co-morbid conditions and/or prevent weight-related complications.  - Explained the importance of continuing lifestyle changes in addition to any anti-obesity medications.   - Labs reviewed from 6/2024 and 7/2024 -will order updated blood work including iron panel due to sensation of cold.     General Recommendations:  Nutrition:  Eat breakfast daily.  Do not skip meals.      Food log (ie.) www.Fewzion.com, sparkpeople.com, loseit.com, calorieking.com, etc.     Practice mindful eating.  Be sure to set aside time to eat, eat slowly, and savor your food.     Hydration:    At least 64oz of water daily.  No sugar sweetened beverages.  No juice (eat the fruit instead).     Exercise:  Studies have shown that the ideal exercise goal is somewhere between 150 to 300 minutes of moderate intensity exercise a week.  Start with exercising 10 minutes every other day and gradually increase physical activity with a goal of at least 150 minutes of moderate intensity exercise a week, divided over at least 3 days a week.  An example of this would be exercising 30 minutes a day, 5 days a week.  Resistance training can increase muscle mass and increase our resting metabolic rate.   FULL BODY resistance training is recommended 2-3 times a week.  Do not do this on consecutive days to allow for muscle recovery.     Aim for a bare minimum 5000 steps, even on days you do not exercise.     Monitoring:   Weigh yourself daily.  If this causes undue stress, then just weigh yourself once a week.  Weigh yourself the same time of the day with the same amount of clothing on.  Preferably this should be done after waking up, before you eat, and with no clothing or minimal clothing on.     Specific Goals:  Calorie goal:  1871-8334  robert/day  Patient lifestyle habits were reviewed and patient was congratulated on her weight loss since last visit.  Nutrition was discussed and patient was advised to consider tracking her calories to make sure she is staying on track with her nutrition intake and adequate calories.  Hydration was discussed and patient will continue to drink at least 64 ounces of water daily  Activity was discussed and patient was encouraged to incorporate strength training into her activity  Medications were discussed and patient will continue on Zepbound 10 mg weekly.  Patient will follow-up in the office every 2 to 3 months for accountability and medical management.

## 2025-02-05 NOTE — PROGRESS NOTES
Assessment/Plan:     Class 1 obesity due to excess calories with serious comorbidity and body mass index (BMI) of 31.0 to 31.9 in adult  - Patient is pursuing Conservative Program and follow up visits with medical weight management provider  - Initial weight loss goal of 5-10% weight loss for improved health. Weight loss can improve patient's co-morbid conditions and/or prevent weight-related complications.  - Explained the importance of continuing lifestyle changes in addition to any anti-obesity medications.   - Labs reviewed from 6/2024 and 7/2024 -will order updated blood work including iron panel due to sensation of cold.     General Recommendations:  Nutrition:  Eat breakfast daily.  Do not skip meals.      Food log (ie.) [email or web address].com, sparkpeople.com, loseit.com, calorieking.com, etc.     Practice mindful eating.  Be sure to set aside time to eat, eat slowly, and savor your food.     Hydration:    At least 64oz of water daily.  No sugar sweetened beverages.  No juice (eat the fruit instead).     Exercise:  Studies have shown that the ideal exercise goal is somewhere between 150 to 300 minutes of moderate intensity exercise a week.  Start with exercising 10 minutes every other day and gradually increase physical activity with a goal of at least 150 minutes of moderate intensity exercise a week, divided over at least 3 days a week.  An example of this would be exercising 30 minutes a day, 5 days a week.  Resistance training can increase muscle mass and increase our resting metabolic rate.   FULL BODY resistance training is recommended 2-3 times a week.  Do not do this on consecutive days to allow for muscle recovery.     Aim for a bare minimum 5000 steps, even on days you do not exercise.     Monitoring:   Weigh yourself daily.  If this causes undue stress, then just weigh yourself once a week.  Weigh yourself the same time of the day with the same amount of clothing on.  Preferably this should be done  after waking up, before you eat, and with no clothing or minimal clothing on.     Specific Goals:  Calorie goal:  [id number] robert/day  Patient lifestyle habits were reviewed and patient was congratulated on her weight loss since last visit.  Nutrition was discussed and patient was advised to consider tracking her calories to make sure she is staying on track with her nutrition intake and adequate calories.  Hydration was discussed and patient will continue to drink at least 64 ounces of water daily  Activity was discussed and patient was encouraged to incorporate strength training into her activity  Medications were discussed and patient will continue on Zepbound 10 mg weekly.  Patient will follow-up in the office every 2 to 3 months for accountability and medical management.         Alyce was seen today for follow-up.    Diagnoses and all orders for this visit:    Class 1 obesity due to excess calories with serious comorbidity and body mass index (BMI) of 31.0 to 31.9 in adult  -     Vitamin D 25 hydroxy; Future  -     Hemoglobin A1C; Future  -     TSH, 3rd generation with Free T4 reflex; Future  -     Lipid panel; Future  -     Comprehensive metabolic panel; Future  -     CBC and differential; Future  -     Iron Panel (Includes Ferritin, Iron Sat%, Iron, and TIBC); Future  -     tirzepatide (Zepbound) 10 mg/0.5 mL auto-injector; Inject 0.5 mL (10 mg total) under the skin once a week    Fatigue  -     Vitamin D 25 hydroxy; Future  -     Hemoglobin A1C; Future  -     TSH, 3rd generation with Free T4 reflex; Future  -     Iron Panel (Includes Ferritin, Iron Sat%, Iron, and TIBC); Future    Vitamin D deficiency  -     Vitamin D 25 hydroxy; Future        Total time spent reviewing chart, interviewing patient, examining patient, discussing plan, answering all questions, and documentin minutes with >50% face-to-face time with the patient.    Follow up in approximately 3 months with Non-Surgical Physician/Advanced  Practitioner.    Subjective:   Chief Complaint   Patient presents with    Follow-up     Pt here today for MWM f/u        Patient ID: Alyce Oliva  is a 49 y.o. female with excess weight/obesity here to pursue weight management.  Patient is pursuing Conservative Program.   Most recent notes and records were reviewed.    HPI    Wt Readings from Last 20 Encounters:   02/05/25 89.5 kg (197 lb 6.4 oz)   10/31/24 94.7 kg (208 lb 12.8 oz)   03/26/24 102 kg (225 lb 8 oz)   03/19/24 103 kg (227 lb 11.2 oz)   03/12/24 104 kg (228 lb 12.8 oz)   03/04/24 106 kg (233 lb 11.2 oz)   02/27/24 105 kg (231 lb 14.4 oz)   02/20/24 105 kg (230 lb 8 oz)   02/06/24 107 kg (235 lb 11.2 oz)   02/01/24 107 kg (235 lb 12.8 oz)   01/30/24 106 kg (233 lb 9.6 oz)   01/16/24 106 kg (233 lb 1.6 oz)   01/05/24 108 kg (237 lb)   12/21/23 107 kg (235 lb 1.6 oz)   12/19/23 106 kg (233 lb 14.4 oz)   12/14/23 109 kg (240 lb)   12/12/23 107 kg (236 lb 9.6 oz)   12/07/23 108 kg (237 lb 8 oz)   11/14/23 108 kg (238 lb 9.6 oz)   10/19/23 105 kg (232 lb)       Patient presents today to medical weight management office for follow up.  Patient continues on Zepbound 10 mg and is tolerating without any side effects.  She is happy at this dose and feels like she has no side effects and is still losing weight steadily.  She continues to try to be mindful of her nutrition and has been eating more consistently throughout the day since last office visit.  Patient has not started an exercise program but would like to get into a routine of 2 to 3 days a week in her home gym.  Patient has noticed that she feels cold all the time since her weight loss journey.    Weight loss medication and dose: Zepbound 10mg  Started weight and date:  238.6 lbs  Current weight: 197.4 lbs (208.8 lbs at last OV)  Difference: -41.2 lbs (-11.4 lbs since last OV)  Percentage of weight loss: -17.2%  Goal weight: 170-180 lbs ultimately     Starting BMI: 39.10 in 11/2023  Current BMI: 31.86    "  Waist Measurements:  2023: 42.5 in  2025: 38.75 in     Nutrition Prescription  Calories: 1,200-1,600 kcal  Protein: 70-87 g  Fluid: 68+ ounces     Diet recall:  B: english muffin and PB  S: cubes and cheese and grapes OR skips  L: salad with eggs or protein   S: no  D: protein with vegetable and starch  S: no     Hydration: water - 48-64oz, unsweetened iced tea, hot tea  Alcohol: no - rarely  Smoking: no  Exercise: not often - yardwork and moving  Occupation: teacher  Sleep: well but only 4-5 hours       Colonoscopy:   Mammogram: 2023      The following portions of the patient's history were reviewed and updated as appropriate: allergies, current medications, past family history, past medical history, past social history, past surgical history, and problem list.    Family History   Problem Relation Age of Onset    Heart disease Mother         pacemaker    Coronary artery disease Mother             Hypertension Mother         pulmonary    Hearing loss Mother     Colon cancer Father             Liver cancer Father     Hepatitis Father     Cancer Father          - colon cancer    Crohn's disease Daughter         Review of Systems   Constitutional:  Negative for fatigue.   HENT:  Negative for sore throat.    Respiratory:  Negative for cough and shortness of breath.    Cardiovascular:  Negative for chest pain, palpitations and leg swelling.   Gastrointestinal:  Negative for abdominal pain, constipation, diarrhea and nausea.   Genitourinary:  Negative for dysuria.   Musculoskeletal:  Negative for arthralgias and back pain.   Skin:  Negative for rash.   Neurological:  Negative for headaches.   Psychiatric/Behavioral:  Negative for dysphoric mood. The patient is not nervous/anxious.        Objective:  /70 (BP Location: Left arm, Patient Position: Sitting, Cuff Size: Large)   Pulse 76   Ht 5' 6\" (1.676 m)   Wt 89.5 kg (197 lb 6.4 oz)   SpO2 98%   BMI 31.86 kg/m²     Physical " Exam  Vitals and nursing note reviewed.   Constitutional:       Appearance: Normal appearance. She is obese.   HENT:      Head: Normocephalic.   Pulmonary:      Effort: Pulmonary effort is normal.   Neurological:      General: No focal deficit present.      Mental Status: She is alert and oriented to person, place, and time.   Psychiatric:         Mood and Affect: Mood normal.         Behavior: Behavior normal.         Thought Content: Thought content normal.         Judgment: Judgment normal.            Labs   Most recent labs reviewed   Lab Results   Component Value Date    SODIUM 139 07/09/2024    K 4.3 07/09/2024     07/09/2024    CO2 24 07/09/2024    AGAP 9 07/09/2024    BUN 17 07/09/2024    CREATININE 0.94 07/09/2024    GLUC 79 07/09/2024    CALCIUM 9.2 07/09/2024    AST 13 07/09/2024    ALT 15 07/09/2024    ALKPHOS 95 07/09/2024    TP 7.0 07/09/2024    TBILI 0.5 07/09/2024    EGFR 73 07/09/2024     Lab Results   Component Value Date    HGBA1C 5.1 07/09/2024     Lab Results   Component Value Date    TSH 1.03 04/06/2023     Lab Results   Component Value Date    CHOLESTEROL 226 (H) 07/09/2024     Lab Results   Component Value Date    HDL 45 07/09/2024     Lab Results   Component Value Date    TRIG 206 (H) 07/09/2024     Lab Results   Component Value Date    LDLCALC 140 (H) 07/09/2024

## 2025-02-13 DIAGNOSIS — Z96.652 STATUS POST TOTAL KNEE REPLACEMENT NOT USING CEMENT, LEFT: Primary | ICD-10-CM

## 2025-02-13 RX ORDER — AMOXICILLIN 500 MG/1
2000 TABLET, FILM COATED ORAL
Qty: 4 TABLET | Refills: 2 | Status: SHIPPED | OUTPATIENT
Start: 2025-02-13 | End: 2025-05-14

## 2025-05-20 NOTE — TELEPHONE ENCOUNTER
Fax received from [facility] requesting refill for Zepbound 5 MG. [facility] states:   - Date last filled 7/7/24    Pt last OV was 2/1/24  Ov visit for 4/9/24 cancelled.    Spoke with pt. Pt stated she took her last inj. In May 2024. Pt stated pharmacy filled and sent Zepbound 5 MG.     - Pt re started medication Zepboung  5 MG on 7/9/24.    - and took her 3rd shot on 7/23/24.     -Pt stated does experience slight nausea when she first takes injection then nausea subsides.    -Pt states has lost 5 lbs in July    -Pt stated did not take any Zepbound injections in May and June.    Stated to pt when out or discontinuing medication to please contact provider.    Pt scheduled next Ov for 9/10/24    Please advise   <-- Click to add NO pertinent Past Medical History

## (undated) DEVICE — CHLORAPREP HI-LITE 26ML ORANGE

## (undated) DEVICE — DRAPE SHEET X-LG

## (undated) DEVICE — LINER FOOT PAD STERILE DISPOSABLE

## (undated) DEVICE — CUFF TOURNIQUET 34 X 4 IN QUICK CONNECT DISP 1BLA

## (undated) DEVICE — VELYS ROBOTIC-ASSISTED SOLUTION ARRAY SET - KNEE: Brand: VELYS

## (undated) DEVICE — BANDAGE, ESMARK LF STR 6"X9' (20/CS): Brand: CYPRESS

## (undated) DEVICE — VELYS ROBOT DRAPE

## (undated) DEVICE — GLOVE SRG BIOGEL 7.5

## (undated) DEVICE — VELYS SATEL DRAPE

## (undated) DEVICE — 3M™ DURAPORE™ SURGICAL TAPE 1538-3, 3 INCH X 10 YARD (7,5CM X 9,1M), 4 ROLLS/BOX: Brand: 3M™ DURAPORE™

## (undated) DEVICE — SPINNING CEMENT MIXING BOWL

## (undated) DEVICE — GLOVE INDICATOR PI UNDERGLOVE SZ 7.5 BLUE

## (undated) DEVICE — COBAN 4 IN STERILE

## (undated) DEVICE — ORTHOPEDIC PACK: Brand: CARDINAL HEALTH

## (undated) DEVICE — GLOVE SRG BIOGEL 8

## (undated) DEVICE — INTENDED FOR TISSUE SEPARATION, AND OTHER PROCEDURES THAT REQUIRE A SHARP SURGICAL BLADE TO PUNCTURE OR CUT.: Brand: BARD-PARKER ® CARBON RIB-BACK BLADES

## (undated) DEVICE — 3M™ STERI-DRAPE™ U-DRAPE 1015: Brand: STERI-DRAPE™

## (undated) DEVICE — SUT STRATAFIX SPIRAL 3-0 PGA/PCL 30 X 30 CM SXMD2B410

## (undated) DEVICE — DUAL CUT SAGITTAL BLADE

## (undated) DEVICE — DRESSING MEPILEX AG BORDER 4 X 12 IN

## (undated) DEVICE — BASIC DOUBLE BASIN 2-LF: Brand: MEDLINE INDUSTRIES, INC.

## (undated) DEVICE — TIBURON EXTREMITY SHEET: Brand: CONVERTORS

## (undated) DEVICE — HANDPIECE SET WITH HIGH FLOW TIP AND SUCTION TUBE: Brand: INTERPULSE

## (undated) DEVICE — HOOD: Brand: FLYTE, SURGICOOL

## (undated) DEVICE — GLOVE INDICATOR PI UNDERGLOVE SZ 8.5 BLUE

## (undated) DEVICE — SKIN MARKER DUAL TIP WITH RULER CAP, FLEXIBLE RULER AND LABELS: Brand: DEVON

## (undated) DEVICE — SUT VICRYL 0 CP-1 27 IN J267H

## (undated) DEVICE — ANTIBACTERIAL UNDYED BRAIDED (POLYGLACTIN 910), SYNTHETIC ABSORBABLE SUTURE: Brand: COATED VICRYL

## (undated) DEVICE — 450 ML BOTTLE OF 0.05% CHLORHEXIDINE GLUCONATE IN 99.95% STERILE WATER FOR IRRIGATION, USP AND APPLICATOR.: Brand: IRRISEPT ANTIMICROBIAL WOUND LAVAGE

## (undated) DEVICE — CAPIT KNEE ATTUNE FB CEMENTLESS

## (undated) DEVICE — SUT STRATAFIX SPIRAL 1-0  CT-1 30 X 30CM SXPD2B403

## (undated) DEVICE — GLOVE INDICATOR PI UNDERGLOVE SZ 8 BLUE

## (undated) DEVICE — VELYS ROBOT-ASSISTED SOLUTION ARRAY DRILL PIN 125 MM X 4 MM: Brand: VELYS

## (undated) DEVICE — VELYS BLADE SAW OSC 85 X 19 X 2MM

## (undated) DEVICE — VEST SURGEON DISPOSABLE

## (undated) DEVICE — ADHESIVE SKIN HIGH VISCOSITY EXOFIN 1ML

## (undated) DEVICE — 3M™ IOBAN™ 2 ANTIMICROBIAL INCISE DRAPE 6650EZ: Brand: IOBAN™ 2

## (undated) DEVICE — INSTRUMENT POUCH: Brand: CONVERTORS

## (undated) DEVICE — GLOVE SRG BIOGEL ORTHOPEDIC 8.5

## (undated) DEVICE — DRESSING MEPILEX AG BORDER POST-OP 4 X 12 IN

## (undated) DEVICE — ASTOUND SURGICAL GOWN, XXX LARGE, X-LONG: Brand: CONVERTORS

## (undated) DEVICE — VELYS ROBOT-ASSISTED SOLUTION ARRAY DRILL PIN 100 MM X 4 MM: Brand: VELYS